# Patient Record
Sex: MALE | Race: WHITE | NOT HISPANIC OR LATINO | Employment: UNEMPLOYED | URBAN - METROPOLITAN AREA
[De-identification: names, ages, dates, MRNs, and addresses within clinical notes are randomized per-mention and may not be internally consistent; named-entity substitution may affect disease eponyms.]

---

## 2017-01-12 ENCOUNTER — OFFICE VISIT (OUTPATIENT)
Dept: PAIN MEDICINE | Facility: CLINIC | Age: 77
End: 2017-01-12

## 2017-01-12 VITALS
SYSTOLIC BLOOD PRESSURE: 112 MMHG | BODY MASS INDEX: 21.92 KG/M2 | WEIGHT: 148 LBS | OXYGEN SATURATION: 93 % | RESPIRATION RATE: 18 BRPM | HEART RATE: 94 BPM | TEMPERATURE: 98.1 F | DIASTOLIC BLOOD PRESSURE: 75 MMHG | HEIGHT: 69 IN

## 2017-01-12 DIAGNOSIS — Z79.899 ENCOUNTER FOR LONG-TERM (CURRENT) USE OF HIGH-RISK MEDICATION: ICD-10-CM

## 2017-01-12 DIAGNOSIS — M54.16 LUMBAR RADICULOPATHY: ICD-10-CM

## 2017-01-12 DIAGNOSIS — M54.12 CERVICAL RADICULOPATHY: ICD-10-CM

## 2017-01-12 DIAGNOSIS — M47.22 OSTEOARTHRITIS OF SPINE WITH RADICULOPATHY, CERVICAL REGION: ICD-10-CM

## 2017-01-12 DIAGNOSIS — M51.36 DEGENERATION OF INTERVERTEBRAL DISC OF LUMBAR REGION: ICD-10-CM

## 2017-01-12 DIAGNOSIS — G89.4 CHRONIC PAIN SYNDROME: Primary | ICD-10-CM

## 2017-01-12 PROCEDURE — 99213 OFFICE O/P EST LOW 20 MIN: CPT | Performed by: NURSE PRACTITIONER

## 2017-01-12 RX ORDER — HYDROCODONE BITARTRATE AND ACETAMINOPHEN 7.5; 325 MG/1; MG/1
1 TABLET ORAL EVERY 6 HOURS PRN
Qty: 120 TABLET | Refills: 0 | Status: SHIPPED | OUTPATIENT
Start: 2017-01-12 | End: 2017-02-10 | Stop reason: SDUPTHER

## 2017-01-12 NOTE — MR AVS SNAPSHOT
Tanya Jalil   1/12/2017 2:00 PM   Office Visit    Dept Phone:  [phone]   Encounter #:  [id number]    Provider:  TAYLER Lutz   Department:  [facility] PAIN MANAGEMENT                Your Full Care Plan              Where to Get Your Medications      You can get these medications from any pharmacy     Bring a paper prescription for each of these medications     HYDROcodone-acetaminophen 7.5-325 MG per tablet            Your Updated Medication List          This list is accurate as of: 1/12/17  2:15 PM.  Always use your most recent med list.                clonazePAM 1 MG tablet   Commonly known as:  KlonoPIN       doxepin 25 MG capsule   Commonly known as:  SINEquan       doxycycline 50 MG capsule   Commonly known as:  VIBRAMYCIN       fenofibrate 160 MG tablet       fluticasone 50 MCG/ACT nasal spray   Commonly known as:  FLONASE       gabapentin 800 MG tablet   Commonly known as:  NEURONTIN       HUMIRA PEN-PSORIASIS STARTER 40 MG/0.8ML Pen-injector Kit   Generic drug:  Adalimumab       HYDROcodone-acetaminophen 7.5-325 MG per tablet   Commonly known as:  NORCO   Take 1 tablet by mouth Every 6 (Six) Hours As Needed (pain).       insulin aspart 100 UNIT/ML solution pen-injector sc pen   Commonly known as:  novoLOG FLEXPEN       insulin glargine 100 UNIT/ML injection   Commonly known as:  LANTUS       ketoconazole 2 % shampoo   Commonly known as:  NIZORAL       vitamin B-12 500 MCG tablet   Commonly known as:  CYANOCOBALAMIN       vitamin D3 5000 UNITS capsule capsule               You Were Diagnosed With        Codes Comments    Chronic pain syndrome    -  Primary ICD-10-CM: G89.4  ICD-9-CM: 338.4     Degeneration of intervertebral disc of lumbar region     ICD-10-CM: M51.36  ICD-9-CM: 722.52     Lumbar radiculopathy     ICD-10-CM: M54.16  ICD-9-CM: 724.4     Osteoarthritis of spine with radiculopathy, cervical region     ICD-10-CM: M47.22  ICD-9-CM: 721.0   "   Cervical radiculopathy     ICD-10-CM: M54.12  ICD-9-CM: 723.4     Encounter for long-term (current) use of high-risk medication     ICD-10-CM: Z79.899  ICD-9-CM: V58.69       Instructions     None    Patient Instructions History      Upcoming Appointments     Visit Type Date Time Department    OFFICE VISIT 2017  2:00 PM [facility]      [employer] Signup     [facility] [employer] allows you to send messages to your doctor, view your test results, renew your prescriptions, schedule appointments, and more. To sign up, go to [employer] [facility] and click on the Sign Up Now link in the New User? box. Enter your [employer] Activation Code exactly as it appears below along with the last four digits of your Social Security Number and your Date of Birth () to complete the sign-up process. If you do not sign up before the expiration date, you must request a new code.    [employer] Activation Code: [email or web address]  Expires: 2017  2:15 PM    If you have questions, you can email [email or web address] or call [phone] to talk to our [employer] staff. Remember, [employer] is NOT to be used for urgent needs. For medical emergencies, dial 911.               Other Info from Your Visit           Allergies     No Known Allergies      Reason for Visit     Back Pain           Vital Signs     Blood Pressure Pulse Temperature Respirations Height Weight    112/75 94 98.1 °F (36.7 °C) 18 69\" (175.3 cm) 148 lb (67.1 kg)    Oxygen Saturation Body Mass Index Smoking Status             93% 21.86 kg/m2 Never Smoker         Problems and Diagnoses Noted     Cervical nerve root disorder    Chronic pain syndrome    Degeneration of lumbar or lumbosacral intervertebral disc    Encounter for long-term (current) use of high-risk medication    Lumbar nerve root disorder    Degenerative arthritis of cervical spine        "

## 2017-01-12 NOTE — PROGRESS NOTES
CHIEF COMPLAINT  Back Pain    Subjective   Tanya Jimenes is a 76 y.o. male  who presents to the office for follow-up.He has a history of chronic neck and low back pain.  Pain pattern remains stable today.      Continues with Hydrocodone 7.5/325 q6hrs (4/day), gabapentin 800 mg 3/day. Denies any side effects from the regimen. The regimen helps decrease his pain by 50%, he is meeting his functional goals, ADL's by self.     Neck Pain    This is a chronic problem. The current episode started more than 1 year ago. The problem occurs constantly. The problem has been unchanged. The pain is at a severity of 6/10. The pain is moderate. The symptoms are aggravated by position and bending. Pertinent negatives include no chest pain, fever, headaches, numbness, visual change or weakness. He has tried neck support and oral narcotics (gabapentin, Hydrocodone) for the symptoms. The treatment provided moderate relief.   Back Pain   This is a chronic problem. The current episode started more than 1 year ago. The problem occurs constantly. The problem is unchanged. Radiates to: right hip. The pain is at a severity of 6/10. The pain is moderate. Pertinent negatives include no bladder incontinence, bowel incontinence, chest pain, dysuria, fever, headaches, numbness or weakness. He has tried analgesics (gabapentin, Hydrocodone) for the symptoms. The treatment provided moderate relief.      PEG Assessment   What number best describes your pain on average in the past week?7  What number best describes how, during the past week, pain has interfered with your enjoyment of life?8  What number best describes how, during the past week, pain has interfered with your general activity?  8    The following portions of the patient's history were reviewed and updated as appropriate: allergies, current medications, past family history, past medical history, past social history, past surgical history and problem list.    Review of Systems  "  Constitutional: Negative for chills and fever.   Respiratory: Negative for shortness of breath.    Cardiovascular: Negative for chest pain.   Gastrointestinal: Negative for bowel incontinence, constipation, diarrhea, nausea and vomiting.   Genitourinary: Negative for bladder incontinence, difficulty urinating and dysuria.   Musculoskeletal: Positive for back pain and neck pain (bilat. arm pain as well).   Neurological: Negative for dizziness, weakness, light-headedness, numbness and headaches.   Psychiatric/Behavioral: Positive for sleep disturbance. Negative for confusion, hallucinations, self-injury and suicidal ideas. The patient is not nervous/anxious.        Vitals:    01/12/17 1404   BP: 112/75   Pulse: 94   Resp: 18   Temp: 98.1 °F (36.7 °C)   SpO2: 93%   Weight: 148 lb (67.1 kg)   Height: 69\" (175.3 cm)   PainSc:   6   PainLoc: Back     Objective   Physical Exam   Constitutional: He is oriented to person, place, and time. Vital signs are normal. He appears well-developed and well-nourished. He is cooperative.   HENT:   Head: Normocephalic and atraumatic.   Nose: Nose normal.   Eyes: Conjunctivae and lids are normal.   Neck: Trachea normal. Neck supple. Muscular tenderness present. Decreased range of motion present.   Cardiovascular: Normal rate and regular rhythm.    Pulmonary/Chest: Effort normal. No respiratory distress.   Abdominal: Normal appearance.   Musculoskeletal:        Cervical back: He exhibits tenderness and spasm.        Lumbar back: He exhibits tenderness and pain.   Neurological: He is alert and oriented to person, place, and time. Gait normal.   Reflex Scores:       Tricep reflexes are 1+ on the right side and 1+ on the left side.       Bicep reflexes are 1+ on the right side and 1+ on the left side.       Patellar reflexes are 1+ on the right side and 1+ on the left side.  Skin: Skin is warm, dry and intact.   Psychiatric: His speech is normal and behavior is normal. Judgment and thought " content normal. Cognition and memory are normal.   Nursing note and vitals reviewed.          Assessment/Plan   Tanya was seen today for back pain.    Diagnoses and all orders for this visit:    Chronic pain syndrome    Degeneration of intervertebral disc of lumbar region    Lumbar radiculopathy    Osteoarthritis of spine with radiculopathy, cervical region    Cervical radiculopathy    Encounter for long-term (current) use of high-risk medication    Other orders  -     HYDROcodone-acetaminophen (NORCO) 7.5-325 MG per tablet; Take 1 tablet by mouth Every 6 (Six) Hours As Needed (pain).      --- Refill Hydrocodone.  Patient appears stable with current regimen. No adverse effects. Regarding continuation of opioids, there is no evidence of aberrant behavior or any red flags.  The patient continues with appropriate response to opioid therapy. ADL's remain intact by self.   --- The urine drug screen confirmation from 7- has been reviewed and the result is appropriate based on patient history and SULEIMAN report  --- Follow-up 1 month               SULEIMAN REPORT    As part of the patient's treatment plan, I am prescribing controlled substances. The patient has been made aware of appropriate use of such medications, including potential risk of somnolence, limited ability to drive and/or work safely, and the potential for dependence or overdose. It has also bee made clear that these medications are for use by this patient only, without concomitant use of alcohol or other substances unless prescribed.     Patient has completed prescribing agreement detailing terms of continued prescribing of controlled substances, including monitoring SULEIMAN reports, urine drug screening, and pill counts if necessary. The patient is aware that inappropriate use will results in cessation of prescribing such medications.    SULEIMAN report has been reviewed and scanned into the patient's chart.    Date of last SULEIMAN : 1-    History  and physical exam exhibit continued safe and appropriate use of controlled substances.    EMR Dragon/Transcription disclaimer:   Much of this encounter note is an electronic transcription/translation of spoken language to printed text. The electronic translation of spoken language may permit erroneous, or at times, nonsensical words or phrases to be inadvertently transcribed; Although I have reviewed the note for such errors, some may still exist.

## 2017-02-10 ENCOUNTER — OFFICE VISIT (OUTPATIENT)
Dept: PAIN MEDICINE | Facility: CLINIC | Age: 77
End: 2017-02-10

## 2017-02-10 VITALS
RESPIRATION RATE: 16 BRPM | HEIGHT: 69 IN | BODY MASS INDEX: 21.77 KG/M2 | SYSTOLIC BLOOD PRESSURE: 118 MMHG | TEMPERATURE: 98.4 F | HEART RATE: 89 BPM | DIASTOLIC BLOOD PRESSURE: 75 MMHG | OXYGEN SATURATION: 95 % | WEIGHT: 147 LBS

## 2017-02-10 DIAGNOSIS — M51.36 DEGENERATION OF INTERVERTEBRAL DISC OF LUMBAR REGION: ICD-10-CM

## 2017-02-10 DIAGNOSIS — Z79.899 ENCOUNTER FOR LONG-TERM (CURRENT) USE OF HIGH-RISK MEDICATION: ICD-10-CM

## 2017-02-10 DIAGNOSIS — G89.4 CHRONIC PAIN SYNDROME: Primary | ICD-10-CM

## 2017-02-10 DIAGNOSIS — M54.2 NECK PAIN: ICD-10-CM

## 2017-02-10 DIAGNOSIS — M54.12 CERVICAL RADICULOPATHY: ICD-10-CM

## 2017-02-10 DIAGNOSIS — M54.16 LUMBAR RADICULOPATHY: ICD-10-CM

## 2017-02-10 PROCEDURE — 96372 THER/PROPH/DIAG INJ SC/IM: CPT | Performed by: NURSE PRACTITIONER

## 2017-02-10 PROCEDURE — 99213 OFFICE O/P EST LOW 20 MIN: CPT | Performed by: NURSE PRACTITIONER

## 2017-02-10 RX ORDER — HYDROCODONE BITARTRATE AND ACETAMINOPHEN 7.5; 325 MG/1; MG/1
1 TABLET ORAL EVERY 6 HOURS PRN
Qty: 120 TABLET | Refills: 0 | Status: SHIPPED | OUTPATIENT
Start: 2017-02-10 | End: 2017-03-10 | Stop reason: SDUPTHER

## 2017-02-10 RX ORDER — KETOROLAC TROMETHAMINE 15 MG/ML
15 INJECTION, SOLUTION INTRAMUSCULAR; INTRAVENOUS ONCE
Status: COMPLETED | OUTPATIENT
Start: 2017-02-10 | End: 2017-02-10

## 2017-02-10 RX ADMIN — KETOROLAC TROMETHAMINE 15 MG: 15 INJECTION, SOLUTION INTRAMUSCULAR; INTRAVENOUS at 14:46

## 2017-02-10 NOTE — PROGRESS NOTES
CHIEF COMPLAINT  Since 1/12/17 office visit,Pt states LBP is tolerably controlled overall.  Pt states activity level has decreased since 1/12/17 however.    Ho Jimenes is a 76 y.o. male  who presents to the office for follow-up.He has a history of chronic neck and low back pain.  Pain unchanged, moderately controlled overall.      He reports that his neck and back have both been flared up over the past week or so.  He reports a lot of aching in his joints, the pain is diffuse.  He reports that he has not been sleeping well. He has been tossing and turning at night because he is uncomfortable.       Continues with Hydrocodone 7.5/325 q6hrs (4/day), gabapentin 800 mg 3/day. Denies any side effects from the regimen. The regimen helps decrease his pain by 50%, he is meeting his functional goals, ADL's by self.     Neck Pain    This is a chronic problem. The current episode started more than 1 year ago. The problem occurs constantly. The problem has been unchanged. The pain is at a severity of 6/10. The pain is moderate. The symptoms are aggravated by position and bending. Pertinent negatives include no chest pain, fever, headaches, numbness, visual change or weakness. He has tried neck support and oral narcotics (gabapentin, Hydrocodone) for the symptoms. The treatment provided moderate relief.   Back Pain   This is a chronic problem. The current episode started more than 1 year ago. The problem occurs constantly. The problem is unchanged. Radiates to: right hip. The pain is at a severity of 6/10. The pain is moderate. Pertinent negatives include no bladder incontinence, bowel incontinence, chest pain, dysuria, fever, headaches, numbness or weakness. He has tried analgesics (gabapentin, Hydrocodone) for the symptoms. The treatment provided moderate relief.      PEG Assessment   What number best describes your pain on average in the past week?6  What number best describes how, during the past week, pain  "has interfered with your enjoyment of life?7  What number best describes how, during the past week, pain has interfered with your general activity?  8    The following portions of the patient's history were reviewed and updated as appropriate: allergies, current medications, past family history, past medical history, past social history, past surgical history and problem list.    Review of Systems   Constitutional: Positive for activity change (decreased). Negative for appetite change and fever.   Respiratory: Negative for apnea, chest tightness and shortness of breath.    Cardiovascular: Negative for chest pain.   Gastrointestinal: Negative for bowel incontinence, constipation, diarrhea and nausea.   Genitourinary: Negative for bladder incontinence, difficulty urinating and dysuria.   Musculoskeletal: Positive for back pain and neck pain.   Neurological: Negative for dizziness, weakness, numbness and headaches.   Psychiatric/Behavioral: Positive for sleep disturbance. Negative for suicidal ideas.     Vitals:    02/10/17 1402   BP: 118/75   Pulse: 89   Resp: 16   Temp: 98.4 °F (36.9 °C)   SpO2: 95%   Weight: 147 lb (66.7 kg)   Height: 69\" (175.3 cm)   PainSc: 6  Comment: LBP bilaterally ranges from 6-8/10   PainLoc: Back     Objective   Physical Exam   Constitutional: He is oriented to person, place, and time. Vital signs are normal. He appears well-developed and well-nourished. He is cooperative.   HENT:   Head: Normocephalic and atraumatic.   Nose: Nose normal.   Eyes: Conjunctivae and lids are normal.   Neck: Trachea normal. Neck supple. Muscular tenderness present. Decreased range of motion present.   Cardiovascular: Normal rate and regular rhythm.    Pulmonary/Chest: Effort normal. No respiratory distress.   Abdominal: Normal appearance.   Musculoskeletal:        Cervical back: He exhibits tenderness and spasm.        Lumbar back: He exhibits tenderness and pain.   Neurological: He is alert and oriented to " person, place, and time. Gait normal.   Reflex Scores:       Patellar reflexes are 1+ on the right side and 1+ on the left side.  Skin: Skin is warm, dry and intact.   Psychiatric: His speech is normal and behavior is normal. Judgment and thought content normal. Cognition and memory are normal.   Nursing note and vitals reviewed.    Assessment/Plan   Tanya was seen today for back pain.    Diagnoses and all orders for this visit:    Chronic pain syndrome  -     ketorolac (TORADOL) injection 15 mg; Inject 15 mg into the shoulder, thigh, or buttocks 1 (One) Time.    Degeneration of intervertebral disc of lumbar region    Lumbar radiculopathy    Neck pain    Cervical radiculopathy    Encounter for long-term (current) use of high-risk medication    Other orders  -     HYDROcodone-acetaminophen (NORCO) 7.5-325 MG per tablet; Take 1 tablet by mouth Every 6 (Six) Hours As Needed (pain).      --- Refill Hydrocodone. Patient appears stable with current regimen. No adverse effects. Regarding continuation of opioids, there is no evidence of aberrant behavior or any red flags. The patient continues with appropriate response to opioid therapy. ADL's remain intact by self.   --- The urine drug screen confirmation from 7- has been reviewed and the result is appropriate based on patient history and SULEIMAN report  --- Order for lumbar brace.  Ordering a back brace with anterior, lateral, and posterior support from T-9 to S-1 to reduce pain by restricting mobility of the trunk.    --- Follow-up 1 month          SULEIMAN REPORT    As part of the patient's treatment plan, I am prescribing controlled substances. The patient has been made aware of appropriate use of such medications, including potential risk of somnolence, limited ability to drive and/or work safely, and the potential for dependence or overdose. It has also bee made clear that these medications are for use by this patient only, without concomitant use of alcohol or  other substances unless prescribed.     Patient has completed prescribing agreement detailing terms of continued prescribing of controlled substances, including monitoring SULEIMAN reports, urine drug screening, and pill counts if necessary. The patient is aware that inappropriate use will results in cessation of prescribing such medications.    SULEIMAN report has been reviewed and scanned into the patient's chart.    Date of last SULEIMAN : 2-9-2017    History and physical exam exhibit continued safe and appropriate use of controlled substances.    EMR Dragon/Transcription disclaimer:   Much of this encounter note is an electronic transcription/translation of spoken language to printed text. The electronic translation of spoken language may permit erroneous, or at times, nonsensical words or phrases to be inadvertently transcribed; Although I have reviewed the note for such errors, some may still exist.

## 2017-03-10 ENCOUNTER — OFFICE VISIT (OUTPATIENT)
Dept: PAIN MEDICINE | Facility: CLINIC | Age: 77
End: 2017-03-10

## 2017-03-10 VITALS
TEMPERATURE: 97.4 F | DIASTOLIC BLOOD PRESSURE: 76 MMHG | WEIGHT: 148.4 LBS | SYSTOLIC BLOOD PRESSURE: 121 MMHG | OXYGEN SATURATION: 96 % | RESPIRATION RATE: 18 BRPM | HEART RATE: 92 BPM | BODY MASS INDEX: 21.98 KG/M2 | HEIGHT: 69 IN

## 2017-03-10 DIAGNOSIS — M51.36 DEGENERATION OF INTERVERTEBRAL DISC OF LUMBAR REGION: ICD-10-CM

## 2017-03-10 DIAGNOSIS — Z79.899 ENCOUNTER FOR LONG-TERM (CURRENT) USE OF HIGH-RISK MEDICATION: ICD-10-CM

## 2017-03-10 DIAGNOSIS — M54.16 LUMBAR RADICULOPATHY: ICD-10-CM

## 2017-03-10 DIAGNOSIS — M54.2 NECK PAIN: ICD-10-CM

## 2017-03-10 DIAGNOSIS — G89.4 CHRONIC PAIN SYNDROME: Primary | ICD-10-CM

## 2017-03-10 PROCEDURE — 99213 OFFICE O/P EST LOW 20 MIN: CPT | Performed by: NURSE PRACTITIONER

## 2017-03-10 RX ORDER — HYDROCODONE BITARTRATE AND ACETAMINOPHEN 7.5; 325 MG/1; MG/1
1 TABLET ORAL EVERY 6 HOURS PRN
Qty: 120 TABLET | Refills: 0 | Status: SHIPPED | OUTPATIENT
Start: 2017-03-10 | End: 2017-04-03 | Stop reason: SDUPTHER

## 2017-03-10 NOTE — PROGRESS NOTES
CHIEF COMPLAINT  Follow-up for back pain. Mr. Jimenes states that his back pain is unchanged from his last appt.    Subjective   Tanya Jimenes is a 76 y.o. male  who presents to the office for follow-up.He has a history of chronic back pain. Pain unchanged, stable.      Continues with Hydrocodone 7.5/325 q6hrs (4/day), gabapentin 800 mg 3/day. Denies any side effects from the regimen. The regimen helps decrease his pain by 50%, he is meeting his functional goals, ADL's by self.     Neck Pain    This is a chronic problem. The current episode started more than 1 year ago. The problem occurs constantly. The problem has been unchanged. The pain is at a severity of 5/10. The pain is moderate. The symptoms are aggravated by position and bending. Pertinent negatives include no chest pain, fever, headaches, numbness, visual change or weakness. He has tried neck support and oral narcotics (gabapentin, Hydrocodone) for the symptoms. The treatment provided moderate relief.   Back Pain   This is a chronic problem. The current episode started more than 1 year ago. The problem occurs constantly. The problem is unchanged. Radiates to: right hip. The pain is at a severity of 5/10. The pain is moderate. Pertinent negatives include no bladder incontinence, bowel incontinence, chest pain, dysuria, fever, headaches, numbness or weakness. He has tried analgesics (gabapentin, Hydrocodone) for the symptoms. The treatment provided moderate relief.      PEG Assessment   What number best describes your pain on average in the past week?7  What number best describes how, during the past week, pain has interfered with your enjoyment of life?7  What number best describes how, during the past week, pain has interfered with your general activity?  7    The following portions of the patient's history were reviewed and updated as appropriate: allergies, current medications, past family history, past medical history, past social history, past surgical  "history and problem list.    Review of Systems   Constitutional: Negative for fatigue and fever.   HENT: Negative for congestion.    Eyes:        Dry eyes   Respiratory: Negative for cough, shortness of breath and wheezing.    Cardiovascular: Negative.  Negative for chest pain.   Gastrointestinal: Negative for bowel incontinence, constipation and diarrhea.   Genitourinary: Negative for bladder incontinence, difficulty urinating and dysuria.   Musculoskeletal: Positive for back pain and neck pain.   Neurological: Negative for weakness, numbness and headaches.   Psychiatric/Behavioral: Positive for sleep disturbance. Negative for suicidal ideas. The patient is nervous/anxious.        Vitals:    03/10/17 1348   BP: 121/76   Pulse: 92   Resp: 18   Temp: 97.4 °F (36.3 °C)   SpO2: 96%   Weight: 148 lb 6.4 oz (67.3 kg)   Height: 69\" (175.3 cm)   PainSc:   5   PainLoc: Back     Objective   Physical Exam   Constitutional: He is oriented to person, place, and time. Vital signs are normal. He appears well-developed and well-nourished. He is cooperative.   HENT:   Head: Normocephalic and atraumatic.   Nose: Nose normal.   Eyes: Conjunctivae and lids are normal.   Neck: Trachea normal. Neck supple. Muscular tenderness present. Decreased range of motion present.   Cardiovascular: Normal rate and regular rhythm.    Pulmonary/Chest: Effort normal. No respiratory distress.   Abdominal: Normal appearance.   Musculoskeletal:        Cervical back: He exhibits tenderness and spasm.        Lumbar back: He exhibits tenderness and pain.   Neurological: He is alert and oriented to person, place, and time. Gait normal.   Skin: Skin is warm, dry and intact.   Psychiatric: His speech is normal and behavior is normal. Judgment and thought content normal. Cognition and memory are normal.   Nursing note and vitals reviewed.    Assessment/Plan   Tanya was seen today for back pain.    Diagnoses and all orders for this visit:    Chronic pain " syndrome    Degeneration of intervertebral disc of lumbar region    Lumbar radiculopathy    Neck pain    Encounter for long-term (current) use of high-risk medication    Other orders  -     HYDROcodone-acetaminophen (NORCO) 7.5-325 MG per tablet; Take 1 tablet by mouth Every 6 (Six) Hours As Needed (pain).    --- Refill Hydrocodone. Patient appears stable with current regimen. No adverse effects. Regarding continuation of opioids, there is no evidence of aberrant behavior or any red flags. The patient continues with appropriate response to opioid therapy. ADL's remain intact by self.   --- The urine drug screen confirmation from 7- has been reviewed and the result is appropriate based on patient history and SULEIMAN report  --- Follow-up 2 months        SULEIMAN REPORT  As part of the patient's treatment plan, I am prescribing controlled substances. The patient has been made aware of appropriate use of such medications, including potential risk of somnolence, limited ability to drive and/or work safely, and the potential for dependence or overdose. It has also bee made clear that these medications are for use by this patient only, without concomitant use of alcohol or other substances unless prescribed.     Patient has completed prescribing agreement detailing terms of continued prescribing of controlled substances, including monitoring SULEIMAN reports, urine drug screening, and pill counts if necessary. The patient is aware that inappropriate use will results in cessation of prescribing such medications.    SULEIMAN report has been reviewed and scanned into the patient's chart.    Date of last SULEIMAN : 3-9-2017    History and physical exam exhibit continued safe and appropriate use of controlled substances.    EMR Dragon/Transcription disclaimer:   Much of this encounter note is an electronic transcription/translation of spoken language to printed text. The electronic translation of spoken language may permit  erroneous, or at times, nonsensical words or phrases to be inadvertently transcribed; Although I have reviewed the note for such errors, some may still exist.

## 2017-04-03 NOTE — TELEPHONE ENCOUNTER
Medication Refill Request    Date of phone call: 4/3/17    Medication being requested: Hydro-apap 7.5 sig: q6hrs  Qty: 120    Date of last visit: 3/10/17    Date of last refill: 3/10/17    SULEIMAN up to date?: 3/9/17    Next Follow up?: 5/10/17    Any new pertinent information? (i.e, new medication allergies, new use of medications, change in patient's health or condition, non-compliance or inconsistency with prescribing agreement?):

## 2017-04-04 RX ORDER — HYDROCODONE BITARTRATE AND ACETAMINOPHEN 7.5; 325 MG/1; MG/1
1 TABLET ORAL EVERY 6 HOURS PRN
Qty: 120 TABLET | Refills: 0 | Status: SHIPPED | OUTPATIENT
Start: 2017-04-04 | End: 2017-05-10 | Stop reason: SDUPTHER

## 2017-05-10 ENCOUNTER — OFFICE VISIT (OUTPATIENT)
Dept: PAIN MEDICINE | Facility: CLINIC | Age: 77
End: 2017-05-10

## 2017-05-10 VITALS
RESPIRATION RATE: 18 BRPM | HEART RATE: 85 BPM | HEIGHT: 69 IN | BODY MASS INDEX: 21.48 KG/M2 | DIASTOLIC BLOOD PRESSURE: 70 MMHG | TEMPERATURE: 98.6 F | SYSTOLIC BLOOD PRESSURE: 110 MMHG | WEIGHT: 145 LBS | OXYGEN SATURATION: 94 %

## 2017-05-10 DIAGNOSIS — M54.2 NECK PAIN: ICD-10-CM

## 2017-05-10 DIAGNOSIS — M51.36 DEGENERATION OF INTERVERTEBRAL DISC OF LUMBAR REGION: ICD-10-CM

## 2017-05-10 DIAGNOSIS — G89.4 CHRONIC PAIN SYNDROME: Primary | ICD-10-CM

## 2017-05-10 DIAGNOSIS — Z79.899 ENCOUNTER FOR LONG-TERM (CURRENT) USE OF HIGH-RISK MEDICATION: ICD-10-CM

## 2017-05-10 DIAGNOSIS — M54.16 LUMBAR RADICULOPATHY: ICD-10-CM

## 2017-05-10 PROCEDURE — 99213 OFFICE O/P EST LOW 20 MIN: CPT | Performed by: NURSE PRACTITIONER

## 2017-05-10 RX ORDER — HYDROCODONE BITARTRATE AND ACETAMINOPHEN 7.5; 325 MG/1; MG/1
1 TABLET ORAL EVERY 6 HOURS PRN
Qty: 120 TABLET | Refills: 0 | Status: SHIPPED | OUTPATIENT
Start: 2017-05-10 | End: 2017-06-01 | Stop reason: SDUPTHER

## 2017-06-01 RX ORDER — HYDROCODONE BITARTRATE AND ACETAMINOPHEN 7.5; 325 MG/1; MG/1
1 TABLET ORAL EVERY 6 HOURS PRN
Qty: 120 TABLET | Refills: 0 | Status: SHIPPED | OUTPATIENT
Start: 2017-06-01 | End: 2017-07-10 | Stop reason: SDUPTHER

## 2017-06-01 NOTE — TELEPHONE ENCOUNTER
Medication Refill Request    Date of phone call: 6/1/17    Medication being requested: Hydro-apap 7.5 sig: q6hrs  Qty: 120    Date of last visit: 5/10/17    Date of last refill: 5/10/17    SULEIMAN up to date?: 5/8/17    Next Follow up?: 7/10/17    Any new pertinent information? (i.e, new medication allergies, new use of medications, change in patient's health or condition, non-compliance or inconsistency with prescribing agreement?): n/a

## 2017-07-10 ENCOUNTER — OFFICE VISIT (OUTPATIENT)
Dept: PAIN MEDICINE | Facility: CLINIC | Age: 77
End: 2017-07-10

## 2017-07-10 VITALS
SYSTOLIC BLOOD PRESSURE: 130 MMHG | DIASTOLIC BLOOD PRESSURE: 81 MMHG | BODY MASS INDEX: 21.18 KG/M2 | HEART RATE: 105 BPM | OXYGEN SATURATION: 93 % | RESPIRATION RATE: 18 BRPM | TEMPERATURE: 98.6 F | WEIGHT: 143 LBS | HEIGHT: 69 IN

## 2017-07-10 DIAGNOSIS — M47.22 OSTEOARTHRITIS OF SPINE WITH RADICULOPATHY, CERVICAL REGION: ICD-10-CM

## 2017-07-10 DIAGNOSIS — G89.4 CHRONIC PAIN SYNDROME: Primary | ICD-10-CM

## 2017-07-10 DIAGNOSIS — M54.2 NECK PAIN: ICD-10-CM

## 2017-07-10 DIAGNOSIS — Z79.899 ENCOUNTER FOR LONG-TERM (CURRENT) USE OF HIGH-RISK MEDICATION: ICD-10-CM

## 2017-07-10 DIAGNOSIS — M51.36 DEGENERATION OF INTERVERTEBRAL DISC OF LUMBAR REGION: ICD-10-CM

## 2017-07-10 LAB
POC AMPHETAMINES: NEGATIVE
POC BARBITURATES: NEGATIVE
POC BENZODIAZEPHINES: NEGATIVE
POC COCAINE: NEGATIVE
POC METHADONE: NEGATIVE
POC METHAMPHETAMINE SCREEN URINE: NEGATIVE
POC OPIATES: POSITIVE
POC OXYCODONE: POSITIVE
POC PHENCYCLIDINE: NEGATIVE
POC PROPOXYPHENE: NEGATIVE
POC THC: NEGATIVE
POC TRICYCLIC ANTIDEPRESSANTS: POSITIVE

## 2017-07-10 PROCEDURE — 99213 OFFICE O/P EST LOW 20 MIN: CPT | Performed by: NURSE PRACTITIONER

## 2017-07-10 PROCEDURE — 80305 DRUG TEST PRSMV DIR OPT OBS: CPT | Performed by: NURSE PRACTITIONER

## 2017-07-10 RX ORDER — HYDROCODONE BITARTRATE AND ACETAMINOPHEN 7.5; 325 MG/1; MG/1
1 TABLET ORAL EVERY 6 HOURS PRN
Qty: 120 TABLET | Refills: 0 | Status: SHIPPED | OUTPATIENT
Start: 2017-07-10 | End: 2017-08-02 | Stop reason: SDUPTHER

## 2017-07-10 NOTE — PROGRESS NOTES
"CHIEF COMPLAINT  Back pain has increased since last visit.    Subjective   Tanya Jimenes is a 77 y.o. male  who presents to the office for follow-up.He has a history of chronic back pain and neck pain.    Complains of pain in his neck and back. Today his pain is 8/10VAS. Describes the pain as continuous and worse since his last week.  He has difficulty distinguishing between which bothers him more, his back or neck, but eventually decides on his low back. Pain increases with walking, prolonged sitting, household chores, bending/lifting/twisting; pain decreases with medication and change position. continues with Hydrocodone 7.5/325 4/day and gabapentin 800 mg 3/day. Denies any side effects from the regimen. The regimen helps decrease his pain \"by quite a bit so i can function.\"  ADL's by self.     He has had MIKI in the past with positive results.  However, he states he has had issues with his mouth getting blisters after his last round of injections.     Sees his PCP later this afternoon, Dr. Nuñez.    Neck Pain    This is a chronic problem. The current episode started more than 1 year ago. The problem occurs constantly. The problem has been unchanged. The pain is at a severity of 6/10. The pain is moderate. The symptoms are aggravated by position and bending. Associated symptoms include weakness. Pertinent negatives include no chest pain, fever, headaches, numbness or visual change. He has tried neck support and oral narcotics (gabapentin, Hydrocodone) for the symptoms. The treatment provided moderate relief.   Back Pain   This is a chronic problem. The current episode started more than 1 year ago. The problem occurs constantly. The problem is unchanged. Radiates to: right hip. The pain is at a severity of 8/10. The pain is moderate. Associated symptoms include weakness. Pertinent negatives include no bladder incontinence, bowel incontinence, chest pain, dysuria, fever, headaches or numbness. He has tried " "analgesics (gabapentin, Hydrocodone) for the symptoms. The treatment provided moderate relief.      PEG Assessment   What number best describes your pain on average in the past week?6  What number best describes how, during the past week, pain has interfered with your enjoyment of life?8  What number best describes how, during the past week, pain has interfered with your general activity?  7    The following portions of the patient's history were reviewed and updated as appropriate: allergies, current medications, past family history, past medical history, past social history, past surgical history and problem list.    Review of Systems   Constitutional: Negative for chills and fever.   Respiratory: Negative for shortness of breath.    Cardiovascular: Negative for chest pain.   Gastrointestinal: Negative for bowel incontinence, constipation, diarrhea, nausea and vomiting.   Genitourinary: Negative for bladder incontinence, difficulty urinating and dysuria.   Musculoskeletal: Positive for back pain and neck pain.   Neurological: Positive for weakness. Negative for dizziness, light-headedness, numbness and headaches.   Psychiatric/Behavioral: Positive for sleep disturbance. Negative for confusion, hallucinations, self-injury and suicidal ideas. The patient is not nervous/anxious.        Vitals:    07/10/17 1348   BP: 130/81   Pulse: 105   Resp: 18   Temp: 98.6 °F (37 °C)   SpO2: 93%   Weight: 143 lb (64.9 kg)   Height: 69\" (175.3 cm)   PainSc:   8   PainLoc: Back     Objective   Physical Exam   Constitutional: He is oriented to person, place, and time. Vital signs are normal. He appears well-developed and well-nourished. He is cooperative.   HENT:   Head: Normocephalic and atraumatic.   Nose: Nose normal.   Eyes: Conjunctivae and lids are normal.   Neck: Trachea normal. Neck supple. Muscular tenderness present. Decreased range of motion present.   Cardiovascular: Normal rate, regular rhythm and normal heart sounds.  "   Pulmonary/Chest: Effort normal and breath sounds normal. No respiratory distress.   Abdominal: Normal appearance.   Musculoskeletal:        Cervical back: He exhibits tenderness.        Lumbar back: He exhibits tenderness.   Moderate tenderness to bilateral SI joints    Negative SLR bilaterally   Neurological: He is alert and oriented to person, place, and time. He has normal strength. No cranial nerve deficit or sensory deficit. Gait normal.   Reflex Scores:       Bicep reflexes are 1+ on the right side and 1+ on the left side.       Brachioradialis reflexes are 1+ on the right side and 1+ on the left side.       Patellar reflexes are 1+ on the right side and 1+ on the left side.  Skin: Skin is warm, dry and intact.   Psychiatric: He has a normal mood and affect. His speech is normal and behavior is normal. Judgment and thought content normal. Cognition and memory are normal.   Nursing note and vitals reviewed.      Assessment/Plan   Tanya was seen today for back pain.    Diagnoses and all orders for this visit:    Chronic pain syndrome  -     Urine Drug Screen Confirmation  -     POC Urine Drug Screen, Triage    Degeneration of intervertebral disc of lumbar region  -     Urine Drug Screen Confirmation  -     POC Urine Drug Screen, Triage    Osteoarthritis of spine with radiculopathy, cervical region  -     Urine Drug Screen Confirmation  -     POC Urine Drug Screen, Triage    Neck pain  -     Urine Drug Screen Confirmation  -     POC Urine Drug Screen, Triage    Encounter for long-term (current) use of high-risk medication  -     Urine Drug Screen Confirmation  -     POC Urine Drug Screen, Triage    Other orders  -     HYDROcodone-acetaminophen (NORCO) 7.5-325 MG per tablet; Take 1 tablet by mouth Every 6 (Six) Hours As Needed for Severe Pain (7-10) (DNF 6-8-17).      --- Routine UDS in office today as part of monitoring requirements for controlled substances.  The specimen was viewed and the immunoassay result  reviewed and is +OPI, +OXY, +TCA(Anticipate false positive of OXY).  This specimen will be sent to [facility] laboratory for confirmation.     --- Refill Hydrocodone. Patient appears stable with current regimen. No adverse effects. Regarding continuation of opioids, there is no evidence of aberrant behavior or any red flags.  The patient continues with appropriate response to opioid therapy. ADL's remain intact by self.   --- Declines bilateral Si joint or lumbar facet injections.  --- Follow-up 2 months or sooner if needed.       SULEIMAN REPORT    As part of the patient's treatment plan, I am prescribing controlled substances. The patient has been made aware of appropriate use of such medications, including potential risk of somnolence, limited ability to drive and/or work safely, and the potential for dependence or overdose. It has also bee made clear that these medications are for use by this patient only, without concomitant use of alcohol or other substances unless prescribed.     Patient has completed prescribing agreement detailing terms of continued prescribing of controlled substances, including monitoring SULEIMAN reports, urine drug screening, and pill counts if necessary. The patient is aware that inappropriate use will results in cessation of prescribing such medications.    SULEIMAN report has been reviewed and scanned into the patient's chart.    Date of last SULEIMAN : 7-7-17    History and physical exam exhibit continued safe and appropriate use of controlled substances.      EMR Dragon/Transcription disclaimer:   Much of this encounter note is an electronic transcription/translation of spoken language to printed text. The electronic translation of spoken language may permit erroneous, or at times, nonsensical words or phrases to be inadvertently transcribed; Although I have reviewed the note for such errors, some may still exist.

## 2017-08-02 RX ORDER — HYDROCODONE BITARTRATE AND ACETAMINOPHEN 7.5; 325 MG/1; MG/1
1 TABLET ORAL EVERY 6 HOURS PRN
Qty: 120 TABLET | Refills: 0 | Status: SHIPPED | OUTPATIENT
Start: 2017-08-02 | End: 2017-09-06 | Stop reason: SDUPTHER

## 2017-08-02 NOTE — TELEPHONE ENCOUNTER
Medication Refill Request    Date of phone call: 8/2/17    Medication being requested: Hydrocodone-apap 7.5 sig: q6hrs  Qty: 120    Date of last visit: 7/10/17    Date of last refill: 7/10/17    SULEIMAN up to date?: 7/7/17    Next Follow up?: 9/7/17    Any new pertinent information? (i.e, new medication allergies, new use of medications, change in patient's health or condition, non-compliance or inconsistency with prescribing agreement?): n/a

## 2017-09-06 RX ORDER — HYDROCODONE BITARTRATE AND ACETAMINOPHEN 7.5; 325 MG/1; MG/1
1 TABLET ORAL EVERY 6 HOURS PRN
Qty: 24 TABLET | Refills: 0 | Status: SHIPPED | OUTPATIENT
Start: 2017-09-06 | End: 2017-09-14 | Stop reason: SDUPTHER

## 2017-09-06 NOTE — TELEPHONE ENCOUNTER
Medication Refill Request    Date of phone call: 9/6/2017    Medication being requested: Hydrocodone-apap 7.5 sig: q6hrs  Qty: 120    Date of last visit: 7/10/17    Date of last refill: 8/2/17    SULEIMAN up to date?: 7/7/17    Next Follow up?: 9/14/17    Any new pertinent information? (i.e, new medication allergies, new use of medications, change in patient's health or condition, non-compliance or inconsistency with prescribing agreement?): patient had to reschedule appointment due to a death in the family

## 2017-09-06 NOTE — TELEPHONE ENCOUNTER
SULEIMAN so I can see actual last refill date and then will give temporary supply until 9-14-17. Thanks. johann

## 2017-09-14 ENCOUNTER — OFFICE VISIT (OUTPATIENT)
Dept: PAIN MEDICINE | Facility: CLINIC | Age: 77
End: 2017-09-14

## 2017-09-14 VITALS
RESPIRATION RATE: 18 BRPM | HEART RATE: 79 BPM | BODY MASS INDEX: 21.18 KG/M2 | WEIGHT: 143 LBS | SYSTOLIC BLOOD PRESSURE: 110 MMHG | HEIGHT: 69 IN | OXYGEN SATURATION: 97 % | TEMPERATURE: 98.1 F | DIASTOLIC BLOOD PRESSURE: 68 MMHG

## 2017-09-14 DIAGNOSIS — M47.812 OSTEOARTHRITIS OF CERVICAL SPINE, UNSPECIFIED SPINAL OSTEOARTHRITIS COMPLICATION STATUS: ICD-10-CM

## 2017-09-14 DIAGNOSIS — M54.12 CERVICAL RADICULOPATHY: ICD-10-CM

## 2017-09-14 DIAGNOSIS — M54.2 NECK PAIN: ICD-10-CM

## 2017-09-14 DIAGNOSIS — M54.16 LUMBAR RADICULOPATHY: ICD-10-CM

## 2017-09-14 DIAGNOSIS — M51.36 DEGENERATION OF INTERVERTEBRAL DISC OF LUMBAR REGION: ICD-10-CM

## 2017-09-14 DIAGNOSIS — G89.4 CHRONIC PAIN SYNDROME: Primary | ICD-10-CM

## 2017-09-14 DIAGNOSIS — Z79.899 ENCOUNTER FOR LONG-TERM (CURRENT) USE OF HIGH-RISK MEDICATION: ICD-10-CM

## 2017-09-14 PROCEDURE — 99213 OFFICE O/P EST LOW 20 MIN: CPT | Performed by: NURSE PRACTITIONER

## 2017-09-14 RX ORDER — HYDROCODONE BITARTRATE AND ACETAMINOPHEN 7.5; 325 MG/1; MG/1
1 TABLET ORAL EVERY 6 HOURS PRN
Qty: 120 TABLET | Refills: 0 | Status: SHIPPED | OUTPATIENT
Start: 2017-09-14 | End: 2017-10-05 | Stop reason: SDUPTHER

## 2017-09-14 NOTE — PROGRESS NOTES
CHIEF COMPLAINT  Patient is here today for a follow up on his back pain that is unchanged since last visit.    Subjective   Tanya Jimenes is a 77 y.o. male  who presents to the office for follow-up.He has a history of back and neck pain. HIs pain pattern is unchanged since his last office visit.     Complains of pain in his neck and back. Today his pain is 6-7/10VAS. Describes the pain as continuous and worse since his last week.  Continues with Hydrocodone 7.5/325 4/day and gabapentin 800 mg 2/day(prescribed by Dr. Nuñez). Denies any side effects from the regimen. The regimen helps decrease his pain moderately.  ADL's by self.     Gabapentin was decreased by Dr. Nuñez. Klonopin was increased to 3/day.     He has had MIKI in the past with positive results.  However, he states he has had issues with his mouth getting blisters after his last round of injections.     Neck Pain    This is a chronic problem. The current episode started more than 1 year ago. The problem occurs constantly. The problem has been unchanged. The pain is at a severity of 6/10. The pain is moderate. The symptoms are aggravated by position and bending. Pertinent negatives include no chest pain, fever, headaches, numbness, visual change or weakness. He has tried neck support and oral narcotics (gabapentin, Hydrocodone) for the symptoms. The treatment provided moderate relief.   Back Pain   This is a chronic problem. The current episode started more than 1 year ago. The problem occurs constantly. The problem is unchanged. Radiates to: right hip. The pain is at a severity of 8/10. The pain is moderate. Pertinent negatives include no bladder incontinence, bowel incontinence, chest pain, dysuria, fever, headaches, numbness or weakness. He has tried analgesics (gabapentin, Hydrocodone) for the symptoms. The treatment provided moderate relief.      PEG Assessment   What number best describes your pain on average in the past week?7  What number best  "describes how, during the past week, pain has interfered with your enjoyment of life?8  What number best describes how, during the past week, pain has interfered with your general activity?  8    The following portions of the patient's history were reviewed and updated as appropriate: allergies, current medications, past family history, past medical history, past social history, past surgical history and problem list.    Review of Systems   Constitutional: Negative for chills and fever.   Respiratory: Negative for shortness of breath.    Cardiovascular: Negative for chest pain.   Gastrointestinal: Negative for bowel incontinence, constipation, diarrhea, nausea and vomiting.   Genitourinary: Negative for bladder incontinence, difficulty urinating and dysuria.   Musculoskeletal: Positive for back pain and neck pain.   Neurological: Negative for dizziness, weakness, light-headedness, numbness and headaches.   Psychiatric/Behavioral: Negative for confusion, hallucinations, self-injury, sleep disturbance and suicidal ideas. The patient is not nervous/anxious.        Vitals:    09/14/17 1048   BP: 110/68   Pulse: 79   Resp: 18   Temp: 98.1 °F (36.7 °C)   SpO2: 97%   Weight: 143 lb (64.9 kg)   Height: 69\" (175.3 cm)     Objective   Physical Exam   Constitutional: He is oriented to person, place, and time. Vital signs are normal. He appears well-developed and well-nourished. He is cooperative.   HENT:   Head: Normocephalic and atraumatic.   Nose: Nose normal.   Eyes: Conjunctivae and lids are normal.   Neck: Trachea normal. Neck supple. Muscular tenderness present. Decreased range of motion present.   Cardiovascular: Normal rate, regular rhythm and normal heart sounds.    Pulmonary/Chest: Effort normal and breath sounds normal. No respiratory distress.   Abdominal: Normal appearance.   Musculoskeletal:        Cervical back: He exhibits tenderness.        Lumbar back: He exhibits tenderness.   Mild tenderness to bilateral SI " joints     Neurological: He is alert and oriented to person, place, and time. Gait normal.   Reflex Scores:       Patellar reflexes are 1+ on the right side and 1+ on the left side.  Skin: Skin is warm, dry and intact.   Psychiatric: He has a normal mood and affect. His speech is normal and behavior is normal. Judgment and thought content normal. Cognition and memory are normal.   Nursing note and vitals reviewed.      Assessment/Plan   Tanya was seen today for back pain.    Diagnoses and all orders for this visit:    Chronic pain syndrome    Degeneration of intervertebral disc of lumbar region    Osteoarthritis of cervical spine, unspecified spinal osteoarthritis complication status    Neck pain    Lumbar radiculopathy    Cervical radiculopathy    Encounter for long-term (current) use of high-risk medication    Other orders  -     HYDROcodone-acetaminophen (NORCO) 7.5-325 MG per tablet; Take 1 tablet by mouth Every 6 (Six) Hours As Needed for Severe Pain  (6 day supply).      --- The urine drug screen confirmation from 7-10-17 has been reviewed and the result is appropriate based on patient history and SULEIMAN report  --- Refill Hydrocodone. Patient appears stable with current regimen. No adverse effects. Regarding continuation of opioids, there is no evidence of aberrant behavior or any red flags.  The patient continues with appropriate response to opioid therapy. ADL's remain intact by self.   --- Declines FABY/intervention at this time.  --- Follow-up 2 months or sooner if needed.       SULEIMAN REPORT    As part of the patient's treatment plan, I am prescribing controlled substances. The patient has been made aware of appropriate use of such medications, including potential risk of somnolence, limited ability to drive and/or work safely, and the potential for dependence or overdose. It has also bee made clear that these medications are for use by this patient only, without concomitant use of alcohol or other  substances unless prescribed.     Patient has completed prescribing agreement detailing terms of continued prescribing of controlled substances, including monitoring SULEIMAN reports, urine drug screening, and pill counts if necessary. The patient is aware that inappropriate use will results in cessation of prescribing such medications.    SULEIMAN report has been reviewed and scanned into the patient's chart.    Date of last SULEIMAN : 9-12-17    History and physical exam exhibit continued safe and appropriate use of controlled substances.      EMR Dragon/Transcription disclaimer:   Much of this encounter note is an electronic transcription/translation of spoken language to printed text. The electronic translation of spoken language may permit erroneous, or at times, nonsensical words or phrases to be inadvertently transcribed; Although I have reviewed the note for such errors, some may still exist.

## 2017-10-05 NOTE — TELEPHONE ENCOUNTER
Medication Refill Request    Date of phone call: 10/4/17    Medication being requested: Hydrocodone-apap 7.5 sig: q6hrs  Qty: 120    Date of last visit: 9/14/17    Date of last refill: 9/14/17    SULEIMAN up to date?: 9/13/17    Next Follow up?: 11/13/17    Any new pertinent information? (i.e, new medication allergies, new use of medications, change in patient's health or condition, non-compliance or inconsistency with prescribing agreement?): n/a

## 2017-10-09 RX ORDER — HYDROCODONE BITARTRATE AND ACETAMINOPHEN 7.5; 325 MG/1; MG/1
1 TABLET ORAL EVERY 6 HOURS PRN
Qty: 120 TABLET | Refills: 0 | Status: SHIPPED | OUTPATIENT
Start: 2017-10-09 | End: 2017-11-13 | Stop reason: SDUPTHER

## 2017-11-13 ENCOUNTER — OFFICE VISIT (OUTPATIENT)
Dept: PAIN MEDICINE | Facility: CLINIC | Age: 77
End: 2017-11-13

## 2017-11-13 VITALS
HEART RATE: 88 BPM | OXYGEN SATURATION: 97 % | TEMPERATURE: 96.8 F | DIASTOLIC BLOOD PRESSURE: 72 MMHG | WEIGHT: 143 LBS | RESPIRATION RATE: 16 BRPM | SYSTOLIC BLOOD PRESSURE: 115 MMHG | HEIGHT: 69 IN | BODY MASS INDEX: 21.18 KG/M2

## 2017-11-13 DIAGNOSIS — M51.36 DEGENERATION OF INTERVERTEBRAL DISC OF LUMBAR REGION: ICD-10-CM

## 2017-11-13 DIAGNOSIS — G89.4 CHRONIC PAIN SYNDROME: Primary | ICD-10-CM

## 2017-11-13 DIAGNOSIS — Z79.899 ENCOUNTER FOR LONG-TERM (CURRENT) USE OF HIGH-RISK MEDICATION: ICD-10-CM

## 2017-11-13 DIAGNOSIS — M47.812 OSTEOARTHRITIS OF CERVICAL SPINE, UNSPECIFIED SPINAL OSTEOARTHRITIS COMPLICATION STATUS: ICD-10-CM

## 2017-11-13 PROCEDURE — 99213 OFFICE O/P EST LOW 20 MIN: CPT | Performed by: NURSE PRACTITIONER

## 2017-11-13 RX ORDER — HYDROCODONE BITARTRATE AND ACETAMINOPHEN 7.5; 325 MG/1; MG/1
1 TABLET ORAL EVERY 6 HOURS PRN
Qty: 120 TABLET | Refills: 0 | Status: SHIPPED | OUTPATIENT
Start: 2017-11-13 | End: 2017-12-11 | Stop reason: SDUPTHER

## 2017-11-13 NOTE — PROGRESS NOTES
CHIEF COMPLAINT    F/U back pain. No changes to note, patient states pain goes up and down, controlled moderately with pain medication.    Subjective   Tanya Jimenes is a 77 y.o. male  who presents to the office for follow-up.He has a history of chronic neck and back pain which is unchanged since his last office visit. He has started PT at [facility]. It helps him temporarily.    Complains of pain in his neck and back. Today his pain is 6/10VAS. Describes the pain as continuous and unchanged.  Continues with Hydrocodone 7.5/325 4/day and gabapentin 800 mg 3/day(prescribed by Dr. Nuñez). Denies any side effects from the regimen. The regimen helps decrease his pain by half. Notes improved function and activity with the regimen.  ADL's by self.     He has had MIKI in the past with positive results.  However, he states he has had issues with his mouth getting blisters after his last round of injections.     He has a history of CKD and cannot tolerate NSAIDs.  Neck Pain    This is a chronic problem. The current episode started more than 1 year ago. The problem occurs constantly. The problem has been unchanged. The pain is at a severity of 6/10. The pain is moderate. The symptoms are aggravated by position and bending. Pertinent negatives include no chest pain, fever, headaches, numbness, visual change or weakness. He has tried neck support and oral narcotics (gabapentin, Hydrocodone) for the symptoms. The treatment provided moderate relief.   Back Pain   This is a chronic problem. The current episode started more than 1 year ago. The problem occurs constantly. The problem is unchanged. Radiates to: right hip. The pain is at a severity of 6/10. The pain is moderate. Pertinent negatives include no bladder incontinence, bowel incontinence, chest pain, dysuria, fever, headaches, numbness or weakness. He has tried analgesics (gabapentin, Hydrocodone) for the symptoms. The treatment provided moderate relief.      PEG  "Assessment   What number best describes your pain on average in the past week?6  What number best describes how, during the past week, pain has interfered with your enjoyment of life?7  What number best describes how, during the past week, pain has interfered with your general activity?  8    The following portions of the patient's history were reviewed and updated as appropriate: allergies, current medications, past family history, past medical history, past social history, past surgical history and problem list.    Review of Systems   Constitutional: Negative for chills and fever.   Respiratory: Negative for shortness of breath.    Cardiovascular: Negative for chest pain.   Gastrointestinal: Negative for bowel incontinence, constipation, diarrhea, nausea and vomiting.   Genitourinary: Negative for bladder incontinence, difficulty urinating and dysuria.   Musculoskeletal: Positive for back pain and neck pain.   Neurological: Negative for dizziness, weakness, light-headedness, numbness and headaches.   Psychiatric/Behavioral: Negative for agitation, confusion, hallucinations, self-injury, sleep disturbance and suicidal ideas. The patient is not nervous/anxious.        Vitals:    11/13/17 1229   BP: 115/72   Pulse: 88   Resp: 16   Temp: 96.8 °F (36 °C)   SpO2: 97%   Weight: 143 lb (64.9 kg)   Height: 69\" (175.3 cm)   PainSc:   6   PainLoc: Back     Objective   Physical Exam   Constitutional: He is oriented to person, place, and time. Vital signs are normal. He appears well-developed and well-nourished. He is cooperative.   HENT:   Head: Normocephalic and atraumatic.   Nose: Nose normal.   Eyes: Conjunctivae and lids are normal.   Neck: Trachea normal. Neck supple. Muscular tenderness present. Decreased range of motion present.   Cardiovascular: Normal rate, regular rhythm and normal heart sounds.    Pulmonary/Chest: Effort normal and breath sounds normal. No respiratory distress.   Abdominal: Normal appearance. "   Musculoskeletal:        Cervical back: He exhibits tenderness.        Lumbar back: He exhibits tenderness.   Mild tenderness to bilateral SI joints     Neurological: He is alert and oriented to person, place, and time. Gait normal.   Reflex Scores:       Patellar reflexes are 1+ on the right side and 1+ on the left side.  Skin: Skin is warm, dry and intact.   Psychiatric: He has a normal mood and affect. His speech is normal and behavior is normal. Judgment and thought content normal. Cognition and memory are normal.   Nursing note and vitals reviewed.      Assessment/Plan   Tanya was seen today for back pain.    Diagnoses and all orders for this visit:    Chronic pain syndrome    Degeneration of intervertebral disc of lumbar region    Osteoarthritis of cervical spine, unspecified spinal osteoarthritis complication status    Encounter for long-term (current) use of high-risk medication    Other orders  -     HYDROcodone-acetaminophen (NORCO) 7.5-325 MG per tablet; Take 1 tablet by mouth Every 6 (Six) Hours As Needed for Severe Pain .      --- The urine drug screen confirmation from 7-10-17 has been reviewed and the result is appropriate based on patient history and SULEIMAN report  --- Refill Hydrocodone. Patient appears stable with current regimen. No adverse effects. Regarding continuation of opioids, there is no evidence of aberrant behavior or any red flags.  The patient continues with appropriate response to opioid therapy. ADL's remain intact by self.   --- Discussed updating MRI. Patient declined. Discussed interventions. Patient declined at this time.   --- Follow-up 2 months or sooner if needed.       SULEIMAN REPORT    As part of the patient's treatment plan, I am prescribing controlled substances. The patient has been made aware of appropriate use of such medications, including potential risk of somnolence, limited ability to drive and/or work safely, and the potential for dependence or overdose. It has  also bee made clear that these medications are for use by this patient only, without concomitant use of alcohol or other substances unless prescribed.     Patient has completed prescribing agreement detailing terms of continued prescribing of controlled substances, including monitoring SULEIMAN reports, urine drug screening, and pill counts if necessary. The patient is aware that inappropriate use will results in cessation of prescribing such medications.    SULEIMAN report has been reviewed and scanned into the patient's chart.    Date of last SULEIMAN : 11-10-17    History and physical exam exhibit continued safe and appropriate use of controlled substances.      EMR Dragon/Transcription disclaimer:   Much of this encounter note is an electronic transcription/translation of spoken language to printed text. The electronic translation of spoken language may permit erroneous, or at times, nonsensical words or phrases to be inadvertently transcribed; Although I have reviewed the note for such errors, some may still exist.

## 2017-12-11 RX ORDER — HYDROCODONE BITARTRATE AND ACETAMINOPHEN 7.5; 325 MG/1; MG/1
1 TABLET ORAL EVERY 6 HOURS PRN
Qty: 120 TABLET | Refills: 0 | Status: SHIPPED | OUTPATIENT
Start: 2017-12-11 | End: 2018-01-11 | Stop reason: SDUPTHER

## 2017-12-11 NOTE — TELEPHONE ENCOUNTER
Medication Refill Request    Date of phone call: 12/6/17    Medication being requested: Hydrocodone-apap 7.5 sig: q6hrs  Qty: 120    Date of last visit: 11/13/17    Date of last refill: 11/13/17    SULEIMAN up to date?: 11/10/17    Next Follow up?: 1/11/2018    Any new pertinent information? (i.e, new medication allergies, new use of medications, change in patient's health or condition, non-compliance or inconsistency with prescribing agreement?): n/a

## 2018-01-11 ENCOUNTER — OFFICE VISIT (OUTPATIENT)
Dept: PAIN MEDICINE | Facility: CLINIC | Age: 78
End: 2018-01-11

## 2018-01-11 VITALS
HEART RATE: 94 BPM | SYSTOLIC BLOOD PRESSURE: 117 MMHG | WEIGHT: 143.2 LBS | OXYGEN SATURATION: 94 % | HEIGHT: 69 IN | RESPIRATION RATE: 16 BRPM | DIASTOLIC BLOOD PRESSURE: 77 MMHG | BODY MASS INDEX: 21.21 KG/M2 | TEMPERATURE: 97.3 F

## 2018-01-11 DIAGNOSIS — M54.2 NECK PAIN: ICD-10-CM

## 2018-01-11 DIAGNOSIS — M47.812 OSTEOARTHRITIS OF CERVICAL SPINE, UNSPECIFIED SPINAL OSTEOARTHRITIS COMPLICATION STATUS: ICD-10-CM

## 2018-01-11 DIAGNOSIS — Z79.899 ENCOUNTER FOR LONG-TERM (CURRENT) USE OF HIGH-RISK MEDICATION: ICD-10-CM

## 2018-01-11 DIAGNOSIS — M51.36 DEGENERATION OF INTERVERTEBRAL DISC OF LUMBAR REGION: ICD-10-CM

## 2018-01-11 DIAGNOSIS — G89.4 CHRONIC PAIN SYNDROME: Primary | ICD-10-CM

## 2018-01-11 PROCEDURE — 99213 OFFICE O/P EST LOW 20 MIN: CPT | Performed by: NURSE PRACTITIONER

## 2018-01-11 RX ORDER — HYDROCODONE BITARTRATE AND ACETAMINOPHEN 7.5; 325 MG/1; MG/1
1 TABLET ORAL EVERY 6 HOURS PRN
Qty: 120 TABLET | Refills: 0 | Status: SHIPPED | OUTPATIENT
Start: 2018-01-11 | End: 2018-02-06 | Stop reason: SDUPTHER

## 2018-01-11 RX ORDER — COLESEVELAM 180 1/1
1875 TABLET ORAL AS NEEDED
COMMUNITY

## 2018-01-11 NOTE — PROGRESS NOTES
CHIEF COMPLAINT    F/U back pain. Pt states unchanged, it gets worse and then better. Helped by pain medication.     Subjective   Tanya Jimenes is a 77 y.o. male  who presents to the office for follow-up.He has a history of chronic back and neck pain, which he reports as being unchanged since last office visit.    Complains of pain in his neck and back. Today his pain is 5/10VAS. Describes the pain as continuous and unchanged. Continues with Hydrocodone 7.5/325 4/day and gabapentin 800 mg 2/day(prescribed by Dr. Nuñez). Denies any side effects from the regimen. The regimen helps decrease his pain moderately.  ADL's by self.     He has had MIKI in the past with positive results.  However, he states he has had issues with his mouth getting blisters after his last round of injections.    Neck Pain    This is a chronic problem. The current episode started more than 1 year ago. The problem occurs constantly. The pain is at a severity of 5/10. The pain is moderate. The symptoms are aggravated by position and bending. Pertinent negatives include no chest pain, fever, headaches, numbness, visual change or weakness. He has tried neck support and oral narcotics (gabapentin, Hydrocodone) for the symptoms. The treatment provided moderate relief.   Back Pain   This is a chronic problem. The current episode started more than 1 year ago. The problem occurs constantly. Progression since onset: unchanged from last office visit. Radiates to: right hip. The pain is at a severity of 5/10. The pain is moderate. Pertinent negatives include no bladder incontinence, bowel incontinence, chest pain, dysuria, fever, headaches, numbness or weakness. He has tried analgesics (gabapentin, Hydrocodone) for the symptoms. The treatment provided moderate relief.        PEG Assessment   What number best describes your pain on average in the past week?6  What number best describes how, during the past week, pain has interfered with your enjoyment of  "life?7  What number best describes how, during the past week, pain has interfered with your general activity?  7    The following portions of the patient's history were reviewed and updated as appropriate: allergies, current medications, past family history, past medical history, past social history, past surgical history and problem list.    Review of Systems   Constitutional: Negative for chills and fever.   HENT: Positive for ear pain (pt states he had an ear ache last night) and hearing loss.    Respiratory: Negative for shortness of breath.    Cardiovascular: Negative for chest pain.   Gastrointestinal: Negative for bowel incontinence, constipation, diarrhea (pt on welchol), nausea and vomiting.   Genitourinary: Negative for bladder incontinence, difficulty urinating and dysuria.   Musculoskeletal: Positive for back pain and neck pain.   Neurological: Negative for dizziness, weakness, light-headedness, numbness and headaches.   Psychiatric/Behavioral: Positive for sleep disturbance. Negative for agitation, confusion, hallucinations, self-injury and suicidal ideas. The patient is not nervous/anxious.        Vitals:    01/11/18 1224   BP: 117/77   Pulse: 94   Resp: 16   Temp: 97.3 °F (36.3 °C)   SpO2: 94%   Weight: 65 kg (143 lb 3.2 oz)   Height: 175.3 cm (69.02\")   PainSc:   5   PainLoc: Back     Objective   Physical Exam   Constitutional: He is oriented to person, place, and time. Vital signs are normal. He appears well-developed and well-nourished. He is cooperative.   HENT:   Head: Normocephalic and atraumatic.   Nose: Nose normal.   Eyes: Conjunctivae and lids are normal.   Neck: Trachea normal. Neck supple. Muscular tenderness present. Decreased range of motion present.   Cardiovascular: Normal rate.    Pulmonary/Chest: Effort normal.   Abdominal: Normal appearance.   Musculoskeletal:        Cervical back: He exhibits tenderness.        Lumbar back: He exhibits tenderness.   Mild tenderness to bilateral SI " joints     Neurological: He is alert and oriented to person, place, and time. Gait normal.   Reflex Scores:       Patellar reflexes are 1+ on the right side and 1+ on the left side.  Skin: Skin is warm, dry and intact.   Psychiatric: He has a normal mood and affect. His speech is normal and behavior is normal. Judgment and thought content normal. Cognition and memory are normal.   Nursing note and vitals reviewed.      Assessment/Plan   Tanya was seen today for back pain.    Diagnoses and all orders for this visit:    Chronic pain syndrome    Osteoarthritis of cervical spine, unspecified spinal osteoarthritis complication status    Degeneration of intervertebral disc of lumbar region    Neck pain    Encounter for long-term (current) use of high-risk medication    Other orders  -     HYDROcodone-acetaminophen (NORCO) 7.5-325 MG per tablet; Take 1 tablet by mouth Every 6 (Six) Hours As Needed for Severe Pain .      --- The urine drug screen confirmation from 7-10-17 has been reviewed and the result is appropriate based on patient history and SULEIMAN report  --- refill Hydrocodone. Patient appears stable with current regimen. No adverse effects. Regarding continuation of opioids, there is no evidence of aberrant behavior or any red flags.  The patient continues with appropriate response to opioid therapy. ADL's remain intact by self.   --- Follow-up 2 months or sooner if needed.       SULEIMAN REPORT    As part of the patient's treatment plan, I am prescribing controlled substances. The patient has been made aware of appropriate use of such medications, including potential risk of somnolence, limited ability to drive and/or work safely, and the potential for dependence or overdose. It has also bee made clear that these medications are for use by this patient only, without concomitant use of alcohol or other substances unless prescribed.     Patient has completed prescribing agreement detailing terms of continued  prescribing of controlled substances, including monitoring SULEIMAN reports, urine drug screening, and pill counts if necessary. The patient is aware that inappropriate use will results in cessation of prescribing such medications.    SULEIMAN report has been reviewed and scanned into the patient's chart.    Date of last SULEIMAN : 1-9-18    History and physical exam exhibit continued safe and appropriate use of controlled substances.      EMR Dragon/Transcription disclaimer:   Much of this encounter note is an electronic transcription/translation of spoken language to printed text. The electronic translation of spoken language may permit erroneous, or at times, nonsensical words or phrases to be inadvertently transcribed; Although I have reviewed the note for such errors, some may still exist.

## 2018-02-06 RX ORDER — HYDROCODONE BITARTRATE AND ACETAMINOPHEN 7.5; 325 MG/1; MG/1
1 TABLET ORAL EVERY 6 HOURS PRN
Qty: 120 TABLET | Refills: 0 | Status: SHIPPED | OUTPATIENT
Start: 2018-02-06 | End: 2018-03-08 | Stop reason: SDUPTHER

## 2018-02-06 NOTE — TELEPHONE ENCOUNTER
Medication Refill Request    Date of phone call: 2/5/18    Medication being requested: Hydrocodone-apap 7.5 sig: q6hrs  Qty: 120    Date of last visit: 1/11/18    Date of last refill: 1/10/18    SULEIMAN up to date?: 1/10/18    Next Follow up?: 3/8/18    Any new pertinent information? (i.e, new medication allergies, new use of medications, change in patient's health or condition, non-compliance or inconsistency with prescribing agreement?): n/a

## 2018-03-08 ENCOUNTER — OFFICE VISIT (OUTPATIENT)
Dept: PAIN MEDICINE | Facility: CLINIC | Age: 78
End: 2018-03-08

## 2018-03-08 VITALS
TEMPERATURE: 98.3 F | WEIGHT: 142 LBS | DIASTOLIC BLOOD PRESSURE: 76 MMHG | BODY MASS INDEX: 21.03 KG/M2 | HEIGHT: 69 IN | RESPIRATION RATE: 18 BRPM | SYSTOLIC BLOOD PRESSURE: 115 MMHG | OXYGEN SATURATION: 93 % | HEART RATE: 95 BPM

## 2018-03-08 DIAGNOSIS — M54.16 LUMBAR RADICULOPATHY: ICD-10-CM

## 2018-03-08 DIAGNOSIS — M51.36 DEGENERATION OF INTERVERTEBRAL DISC OF LUMBAR REGION: ICD-10-CM

## 2018-03-08 DIAGNOSIS — G89.4 CHRONIC PAIN SYNDROME: Primary | ICD-10-CM

## 2018-03-08 DIAGNOSIS — M47.812 OSTEOARTHRITIS OF CERVICAL SPINE, UNSPECIFIED SPINAL OSTEOARTHRITIS COMPLICATION STATUS: ICD-10-CM

## 2018-03-08 DIAGNOSIS — Z79.899 ENCOUNTER FOR LONG-TERM (CURRENT) USE OF HIGH-RISK MEDICATION: ICD-10-CM

## 2018-03-08 PROCEDURE — 99213 OFFICE O/P EST LOW 20 MIN: CPT | Performed by: NURSE PRACTITIONER

## 2018-03-08 RX ORDER — HYDROCODONE BITARTRATE AND ACETAMINOPHEN 7.5; 325 MG/1; MG/1
1 TABLET ORAL EVERY 6 HOURS PRN
Qty: 120 TABLET | Refills: 0 | Status: SHIPPED | OUTPATIENT
Start: 2018-03-08 | End: 2018-04-03 | Stop reason: SDUPTHER

## 2018-03-08 NOTE — PROGRESS NOTES
CHIEF COMPLAINT  Back pain is unchanged since last visit.    Subjective   Tanya Jimenes is a 77 y.o. male  who presents to the office for follow-up.He has a history of back and neck pain, reports his pain is unchanged since last office visit.    Complains of pain in his neck and back. Today his pain is 6-7/10VAS. Describes the pain as continuous and unchanged.  Pain increases with certain position and activity; pain decreases with medication and rest. Continues with Hydrocodone 7.5/325 4/day and gabapentin 800 mg 2/day(prescribed by Dr. Nuñez). Denies any side effects from the regimen. The regimen helps decrease his pain moderately.  ADL's by self.      Presents with wife.  He has had MIKI in the past with positive results.  However, he states he has had issues with his mouth getting blisters after his last round of injections.  Cannot take NSAIDs due to CKD.   Neck Pain    This is a chronic problem. The current episode started more than 1 year ago. The problem occurs constantly. The pain is at a severity of 5/10. The pain is moderate. The symptoms are aggravated by position and bending. Pertinent negatives include no chest pain, fever, headaches, numbness, visual change or weakness. He has tried neck support and oral narcotics (gabapentin, Hydrocodone) for the symptoms. The treatment provided moderate relief.   Back Pain   This is a chronic problem. The current episode started more than 1 year ago. The problem occurs constantly. Progression since onset: unchanged from last office visit. Radiates to: right hip. The pain is at a severity of 5/10. The pain is moderate. Pertinent negatives include no bladder incontinence, bowel incontinence, chest pain, dysuria, fever, headaches, numbness or weakness. He has tried analgesics (gabapentin, Hydrocodone) for the symptoms. The treatment provided moderate relief.      PEG Assessment   What number best describes your pain on average in the past week?5  What number best  "describes how, during the past week, pain has interfered with your enjoyment of life?7  What number best describes how, during the past week, pain has interfered with your general activity?  7    The following portions of the patient's history were reviewed and updated as appropriate: allergies, current medications, past family history, past medical history, past social history, past surgical history and problem list.    Review of Systems   Constitutional: Negative for chills and fever.   Respiratory: Negative for shortness of breath.    Cardiovascular: Negative for chest pain.   Gastrointestinal: Negative for bowel incontinence, constipation, diarrhea, nausea and vomiting.   Genitourinary: Negative for bladder incontinence, difficulty urinating, dysuria and enuresis.   Musculoskeletal: Positive for back pain and neck pain.   Neurological: Negative for dizziness, weakness, light-headedness, numbness and headaches.   Psychiatric/Behavioral: Positive for sleep disturbance. Negative for confusion, hallucinations, self-injury and suicidal ideas. The patient is not nervous/anxious.        Vitals:    03/08/18 1252   BP: 115/76   Pulse: 95   Resp: 18   Temp: 98.3 °F (36.8 °C)   SpO2: 93%   Weight: 64.4 kg (142 lb)   Height: 175.3 cm (69\")     Objective   Physical Exam   Constitutional: He is oriented to person, place, and time. Vital signs are normal. He appears well-developed and well-nourished. He is cooperative.   HENT:   Head: Normocephalic and atraumatic.   Nose: Nose normal.   Eyes: Conjunctivae and lids are normal.   Neck: Trachea normal. Neck supple. Muscular tenderness present. Decreased range of motion present.   Cardiovascular: Normal rate.    Pulmonary/Chest: Effort normal.   Abdominal: Normal appearance.   Musculoskeletal:        Cervical back: He exhibits tenderness.        Lumbar back: He exhibits tenderness.   Mild tenderness to bilateral SI joints     Neurological: He is alert and oriented to person, " place, and time. Gait normal.   Reflex Scores:       Patellar reflexes are 1+ on the right side and 1+ on the left side.  Skin: Skin is warm, dry and intact.   Psychiatric: He has a normal mood and affect. His speech is normal and behavior is normal. Judgment and thought content normal. Cognition and memory are normal.   Nursing note and vitals reviewed.      Assessment/Plan   Tanya was seen today for back pain.    Diagnoses and all orders for this visit:    Chronic pain syndrome    Degeneration of intervertebral disc of lumbar region    Lumbar radiculopathy    Osteoarthritis of cervical spine, unspecified spinal osteoarthritis complication status    Encounter for long-term (current) use of high-risk medication    Other orders  -     HYDROcodone-acetaminophen (NORCO) 7.5-325 MG per tablet; Take 1 tablet by mouth Every 6 (Six) Hours As Needed for Severe Pain .      --- The urine drug screen confirmation from 7-10-17 has been reviewed and the result is appropriate based on patient history and SULEIMAN report  --- refill Hydrocodone. Patient appears stable with current regimen. No adverse effects. Regarding continuation of opioids, there is no evidence of aberrant behavior or any red flags.  The patient continues with appropriate response to opioid therapy. ADL's remain intact by self.   --- Declines cervical or lumbar injections  --- Follow-up 2 months          SULEIMAN REPORT    As part of the patient's treatment plan, I am prescribing controlled substances. The patient has been made aware of appropriate use of such medications, including potential risk of somnolence, limited ability to drive and/or work safely, and the potential for dependence or overdose. It has also bee made clear that these medications are for use by this patient only, without concomitant use of alcohol or other substances unless prescribed.     Patient has completed prescribing agreement detailing terms of continued prescribing of controlled  substances, including monitoring SULEIMAN reports, urine drug screening, and pill counts if necessary. The patient is aware that inappropriate use will results in cessation of prescribing such medications.    SULEIMAN report has been reviewed and scanned into the patient's chart.    Date of last SULEIMAN : 3/7/2018    History and physical exam exhibit continued safe and appropriate use of controlled substances.    EMR Dragon/Transcription disclaimer:   Much of this encounter note is an electronic transcription/translation of spoken language to printed text. The electronic translation of spoken language may permit erroneous, or at times, nonsensical words or phrases to be inadvertently transcribed; Although I have reviewed the note for such errors, some may still exist.

## 2018-04-03 RX ORDER — HYDROCODONE BITARTRATE AND ACETAMINOPHEN 7.5; 325 MG/1; MG/1
1 TABLET ORAL EVERY 6 HOURS PRN
Qty: 120 TABLET | Refills: 0 | Status: SHIPPED | OUTPATIENT
Start: 2018-04-03 | End: 2018-05-07 | Stop reason: SDUPTHER

## 2018-04-03 NOTE — TELEPHONE ENCOUNTER
Medication Refill Request    Date of phone call: 4-2-18    Medication being requested: Hydrocodone-apap 7.5-325 mg sig: Take 1 tablet by mouth every 6 hours as needed for severe pain  Qty: 120 tablets    Date of last visit: 3-08-18    Date of last refill: 3-08-18    SULEIMAN up to date?: yes, 3-07-18    Next Follow up?: 5-7-18    Any new pertinent information? (i.e, new medication allergies, new use of medications, change in patient's health or condition, non-compliance or inconsistency with prescribing agreement?): n/a

## 2018-05-07 ENCOUNTER — RESULTS ENCOUNTER (OUTPATIENT)
Dept: PAIN MEDICINE | Facility: CLINIC | Age: 78
End: 2018-05-07

## 2018-05-07 ENCOUNTER — OFFICE VISIT (OUTPATIENT)
Dept: PAIN MEDICINE | Facility: CLINIC | Age: 78
End: 2018-05-07

## 2018-05-07 VITALS
HEART RATE: 95 BPM | HEIGHT: 69 IN | SYSTOLIC BLOOD PRESSURE: 104 MMHG | WEIGHT: 137 LBS | OXYGEN SATURATION: 92 % | TEMPERATURE: 98.7 F | RESPIRATION RATE: 18 BRPM | DIASTOLIC BLOOD PRESSURE: 72 MMHG | BODY MASS INDEX: 20.29 KG/M2

## 2018-05-07 DIAGNOSIS — Z79.899 ENCOUNTER FOR LONG-TERM (CURRENT) USE OF HIGH-RISK MEDICATION: ICD-10-CM

## 2018-05-07 DIAGNOSIS — M54.16 LUMBAR RADICULOPATHY: ICD-10-CM

## 2018-05-07 DIAGNOSIS — G89.4 CHRONIC PAIN SYNDROME: Primary | ICD-10-CM

## 2018-05-07 DIAGNOSIS — M47.812 OSTEOARTHRITIS OF CERVICAL SPINE, UNSPECIFIED SPINAL OSTEOARTHRITIS COMPLICATION STATUS: ICD-10-CM

## 2018-05-07 DIAGNOSIS — M51.36 DEGENERATION OF INTERVERTEBRAL DISC OF LUMBAR REGION: ICD-10-CM

## 2018-05-07 DIAGNOSIS — M54.12 CERVICAL RADICULOPATHY: ICD-10-CM

## 2018-05-07 DIAGNOSIS — G89.4 CHRONIC PAIN SYNDROME: ICD-10-CM

## 2018-05-07 LAB
POC AMPHETAMINES: NEGATIVE
POC BARBITURATES: NEGATIVE
POC BENZODIAZEPHINES: NEGATIVE
POC COCAINE: NEGATIVE
POC METHADONE: NEGATIVE
POC METHAMPHETAMINE SCREEN URINE: NEGATIVE
POC OPIATES: POSITIVE
POC OXYCODONE: NEGATIVE
POC PHENCYCLIDINE: NEGATIVE
POC PROPOXYPHENE: NEGATIVE
POC THC: NEGATIVE
POC TRICYCLIC ANTIDEPRESSANTS: POSITIVE

## 2018-05-07 PROCEDURE — 99213 OFFICE O/P EST LOW 20 MIN: CPT | Performed by: NURSE PRACTITIONER

## 2018-05-07 PROCEDURE — 80305 DRUG TEST PRSMV DIR OPT OBS: CPT | Performed by: NURSE PRACTITIONER

## 2018-05-07 RX ORDER — HYDROCODONE BITARTRATE AND ACETAMINOPHEN 7.5; 325 MG/1; MG/1
1 TABLET ORAL EVERY 6 HOURS PRN
Qty: 120 TABLET | Refills: 0 | Status: SHIPPED | OUTPATIENT
Start: 2018-05-07 | End: 2018-06-05 | Stop reason: SDUPTHER

## 2018-05-07 NOTE — PROGRESS NOTES
CHIEF COMPLAINT  Back pain is unchanged since last visit.    Subjective   Tanya Jimenes is a 78 y.o. male  who presents to the office for follow-up.He has a history of chronic neck and back pain. Reports this is unchanged from last office visit.    Complains of pain in his neck and back. Today his pain is 6/10VAS. Describes the pain as continuous and unchanged. Is having some anterior thigh pain, bilateral arm pain. WAs recently in hospital for pneumonia for 2-3 days. Was discharged a few weeks ago. Continues with Hydrocodone 7.5/325 4/day and gabapentin 800 mg 2/day(prescribed by Dr. Nuñez). Denies any side effects from the regimen. The regimen helps decrease his pain moderately.  ADL's by self.      Presents with wife.  He has had MIKI in the past with positive results.  However, he states he has had issues with his mouth getting blisters after his last round of injections.  Cannot take NSAIDs due to CKD.     Neck Pain    This is a chronic problem. The current episode started more than 1 year ago. The problem occurs constantly. The pain is at a severity of 6/10. The pain is moderate. The symptoms are aggravated by position and bending. Associated symptoms include weakness. Pertinent negatives include no chest pain, fever, headaches, numbness or visual change. He has tried neck support and oral narcotics (gabapentin, Hydrocodone) for the symptoms. The treatment provided moderate relief.   Back Pain   This is a chronic problem. The current episode started more than 1 year ago. The problem occurs constantly. Progression since onset: unchanged from last office visit. Radiates to: right hip. The pain is at a severity of 6/10. The pain is moderate. Associated symptoms include weakness. Pertinent negatives include no bladder incontinence, bowel incontinence, chest pain, dysuria, fever, headaches or numbness. He has tried analgesics (gabapentin, Hydrocodone) for the symptoms. The treatment provided moderate relief.     "  PEG Assessment   What number best describes your pain on average in the past week?6  What number best describes how, during the past week, pain has interfered with your enjoyment of life?8  What number best describes how, during the past week, pain has interfered with your general activity?  8    The following portions of the patient's history were reviewed and updated as appropriate: allergies, current medications, past family history, past medical history, past social history, past surgical history and problem list.    Review of Systems   Constitutional: Negative for chills and fever.   Respiratory: Positive for shortness of breath.    Cardiovascular: Negative for chest pain.   Gastrointestinal: Positive for constipation. Negative for bowel incontinence, diarrhea, nausea and vomiting.   Genitourinary: Negative for bladder incontinence, difficulty urinating, dysuria and enuresis.   Musculoskeletal: Positive for back pain and neck pain.   Neurological: Positive for weakness. Negative for dizziness, light-headedness, numbness and headaches.   Psychiatric/Behavioral: Positive for sleep disturbance. Negative for confusion, hallucinations, self-injury and suicidal ideas. The patient is not nervous/anxious.        Vitals:    05/07/18 1338   BP: 104/72   Pulse: 95   Resp: 18   Temp: 98.7 °F (37.1 °C)   SpO2: 92%   Weight: 62.1 kg (137 lb)   Height: 175.3 cm (69\")   PainSc:   6   PainLoc: Back     Objective   Physical Exam   Constitutional: He is oriented to person, place, and time. Vital signs are normal. He appears well-developed and well-nourished. He is cooperative.   HENT:   Head: Normocephalic and atraumatic.   Nose: Nose normal.   Eyes: Conjunctivae and lids are normal.   Neck: Trachea normal. Neck supple. Muscular tenderness present. Decreased range of motion present.   Cardiovascular: Normal rate.    Pulmonary/Chest: Effort normal.   Abdominal: Normal appearance.   Musculoskeletal:        Cervical back: He " exhibits tenderness.        Lumbar back: He exhibits tenderness.   Mild tenderness to bilateral SI joints     Neurological: He is alert and oriented to person, place, and time. Gait normal.   Reflex Scores:       Patellar reflexes are 1+ on the right side and 1+ on the left side.  Skin: Skin is warm, dry and intact.   Psychiatric: He has a normal mood and affect. His speech is normal and behavior is normal. Judgment and thought content normal. Cognition and memory are normal.   Nursing note and vitals reviewed.      Assessment/Plan   Tanya was seen today for back pain.    Diagnoses and all orders for this visit:    Chronic pain syndrome  -     Urine Drug Screen Confirmation - Urine, Urine, Clean Catch; Future  -     POC Urine Drug Screen, Triage    Degeneration of intervertebral disc of lumbar region    Lumbar radiculopathy    Osteoarthritis of cervical spine, unspecified spinal osteoarthritis complication status    Cervical radiculopathy    Encounter for long-term (current) use of high-risk medication    Other orders  -     HYDROcodone-acetaminophen (NORCO) 7.5-325 MG per tablet; Take 1 tablet by mouth Every 6 (Six) Hours As Needed for Severe Pain .      --- Routine UDS in office today as part of monitoring requirements for controlled substances.  The specimen was viewed and the immunoassay result reviewed and is +OPI, +TCA(APPROPRIATE).  This specimen will be sent to [facility] for confirmation.     --- Refill Hydrocodone. Patient appears stable with current regimen. No adverse effects. Regarding continuation of opioids, there is no evidence of aberrant behavior or any red flags.  The patient continues with appropriate response to opioid therapy. ADL's remain intact by self.   --- Follow-up 2 months or sooner if needed.       SULEIMAN REPORT    As part of the patient's treatment plan, I am prescribing controlled substances. The patient has been made aware of appropriate use of such medications, including  potential risk of somnolence, limited ability to drive and/or work safely, and the potential for dependence or overdose. It has also bee made clear that these medications are for use by this patient only, without concomitant use of alcohol or other substances unless prescribed.     Patient has completed prescribing agreement detailing terms of continued prescribing of controlled substances, including monitoring SULEIMAN reports, urine drug screening, and pill counts if necessary. The patient is aware that inappropriate use will results in cessation of prescribing such medications.    SULEIMAN report has been reviewed and scanned into the patient's chart.    Date of last SULEIMAN : 5-4-18    History and physical exam exhibit continued safe and appropriate use of controlled substances.      EMR Dragon/Transcription disclaimer:   Much of this encounter note is an electronic transcription/translation of spoken language to printed text. The electronic translation of spoken language may permit erroneous, or at times, nonsensical words or phrases to be inadvertently transcribed; Although I have reviewed the note for such errors, some may still exist.

## 2018-06-05 RX ORDER — HYDROCODONE BITARTRATE AND ACETAMINOPHEN 7.5; 325 MG/1; MG/1
1 TABLET ORAL EVERY 6 HOURS PRN
Qty: 120 TABLET | Refills: 0 | Status: SHIPPED | OUTPATIENT
Start: 2018-06-05 | End: 2018-07-03 | Stop reason: SDUPTHER

## 2018-06-05 NOTE — TELEPHONE ENCOUNTER
Medication Refill Request    Date of phone call: 6/5/18    Medication being requested: Hydrocodone-apap 7.5 mg sig: q6hrs  Qty: 120    Date of last visit: 5/7/18    Date of last refill: 5/7/18    SULEIMAN up to date?: 5/4/18    Next Follow up?: 7/3/18    Any new pertinent information? (i.e, new medication allergies, new use of medications, change in patient's health or condition, non-compliance or inconsistency with prescribing agreement?): patient states he didn't know he still had to call 7 days prior to running out of medication. He is here to  a script and states that he is out of medication.

## 2018-07-03 ENCOUNTER — OFFICE VISIT (OUTPATIENT)
Dept: PAIN MEDICINE | Facility: CLINIC | Age: 78
End: 2018-07-03

## 2018-07-03 VITALS
HEIGHT: 69 IN | SYSTOLIC BLOOD PRESSURE: 109 MMHG | WEIGHT: 139 LBS | RESPIRATION RATE: 18 BRPM | HEART RATE: 100 BPM | OXYGEN SATURATION: 94 % | DIASTOLIC BLOOD PRESSURE: 69 MMHG | BODY MASS INDEX: 20.59 KG/M2 | TEMPERATURE: 98.4 F

## 2018-07-03 DIAGNOSIS — M47.812 OSTEOARTHRITIS OF CERVICAL SPINE, UNSPECIFIED SPINAL OSTEOARTHRITIS COMPLICATION STATUS: ICD-10-CM

## 2018-07-03 DIAGNOSIS — Z79.899 ENCOUNTER FOR LONG-TERM (CURRENT) USE OF HIGH-RISK MEDICATION: ICD-10-CM

## 2018-07-03 DIAGNOSIS — M51.36 DEGENERATION OF INTERVERTEBRAL DISC OF LUMBAR REGION: ICD-10-CM

## 2018-07-03 DIAGNOSIS — G89.4 CHRONIC PAIN SYNDROME: Primary | ICD-10-CM

## 2018-07-03 PROCEDURE — 99214 OFFICE O/P EST MOD 30 MIN: CPT | Performed by: NURSE PRACTITIONER

## 2018-07-03 RX ORDER — HYDROCODONE BITARTRATE AND ACETAMINOPHEN 7.5; 325 MG/1; MG/1
1 TABLET ORAL EVERY 6 HOURS PRN
Qty: 120 TABLET | Refills: 0 | Status: SHIPPED | OUTPATIENT
Start: 2018-07-03 | End: 2018-07-31 | Stop reason: SDUPTHER

## 2018-07-03 NOTE — PROGRESS NOTES
"CHIEF COMPLAINT  Back pain is unchanged since last visit.    Subjective   Tanya Jimenes is a 78 y.o. male  who presents to the office for follow-up.He has a history of chronic neck and back pain. Reports this is unchanged from last office visit.    Complains of pain in his neck and back. Today his pain is 6/10VAS. Describes the pain as continuous and unchanged. Pain worsens as day progresses. Continues with Hydrocodone 7.5/325 4/day and gabapentin 800 mg 3/day(prescribed by Dr. Nuñez). Denies any side effects from the regimen. The regimen helps decrease his pain moderately.  \"i feel better and I can walk better.\" ADL's by self.   Is now taking Pantoprazole 20 mg daily, Cetrizine 10 mg daily, Simethicone 80 mg daily and Levemir 12 units once day.  He has had MIKI in the past with positive results.  However, he states he has had issues with his mouth getting blisters after his last round of injections.  Cannot take NSAIDs due to CKD.   Presents with wife.  Neck Pain    This is a chronic problem. The current episode started more than 1 year ago. The problem occurs constantly. Progression since onset: unchanged from last office visit. The pain is at a severity of 6/10. The pain is moderate. The symptoms are aggravated by position and bending. Pertinent negatives include no chest pain, fever, headaches, numbness, visual change or weakness. He has tried neck support and oral narcotics (gabapentin, Hydrocodone) for the symptoms. The treatment provided moderate relief.   Back Pain   This is a chronic problem. The current episode started more than 1 year ago. The problem occurs constantly. Progression since onset: unchanged from last office visit. The pain is at a severity of 6/10. The pain is moderate. Pertinent negatives include no bladder incontinence, bowel incontinence, chest pain, dysuria, fever, headaches, numbness or weakness. He has tried analgesics (gabapentin, Hydrocodone) for the symptoms. The treatment provided " "moderate relief.    I have reviewed the above HPI today, 07/03/18.  The HPI is otherwise unchanged from the previous office visit.      PEG Assessment   What number best describes your pain on average in the past week?6  What number best describes how, during the past week, pain has interfered with your enjoyment of life?7  What number best describes how, during the past week, pain has interfered with your general activity?  7    The following portions of the patient's history were reviewed and updated as appropriate: allergies, current medications, past family history, past medical history, past social history, past surgical history and problem list.    Review of Systems   Constitutional: Negative for chills and fever.   Respiratory: Negative for shortness of breath.    Cardiovascular: Negative for chest pain.   Gastrointestinal: Negative for bowel incontinence, constipation, diarrhea, nausea and vomiting.   Genitourinary: Negative for bladder incontinence, difficulty urinating, dysuria and enuresis.   Musculoskeletal: Positive for back pain and neck pain.   Neurological: Negative for dizziness, weakness, light-headedness, numbness and headaches.   Psychiatric/Behavioral: Negative for confusion, hallucinations, self-injury, sleep disturbance and suicidal ideas. The patient is not nervous/anxious.        Vitals:    07/03/18 1230   BP: 109/69   Pulse: 100   Resp: 18   Temp: 98.4 °F (36.9 °C)   SpO2: 94%   Weight: 63 kg (139 lb)   Height: 175.3 cm (69\")   PainSc:   6   PainLoc: Back     Objective   Physical Exam   Constitutional: He is oriented to person, place, and time. Vital signs are normal. He appears well-developed and well-nourished. He is cooperative.   HENT:   Head: Normocephalic and atraumatic.   Nose: Nose normal.   Eyes: Conjunctivae and lids are normal.   Neck: Trachea normal. Neck supple. Muscular tenderness present. Decreased range of motion present.   Cardiovascular: Normal rate.    Pulmonary/Chest: " Effort normal.   Abdominal: Normal appearance.   Musculoskeletal:        Cervical back: He exhibits tenderness.        Lumbar back: He exhibits tenderness.        Neurological: He is alert and oriented to person, place, and time. Gait normal.   Reflex Scores:       Bicep reflexes are 1+ on the right side and 1+ on the left side.       Brachioradialis reflexes are 1+ on the right side and 1+ on the left side.       Patellar reflexes are 1+ on the right side and 1+ on the left side.  Skin: Skin is warm, dry and intact.   Psychiatric: He has a normal mood and affect. His speech is normal and behavior is normal. Judgment and thought content normal. Cognition and memory are normal.   Nursing note and vitals reviewed.      Assessment/Plan   Tanya was seen today for back pain.    Diagnoses and all orders for this visit:    Chronic pain syndrome    Degeneration of intervertebral disc of lumbar region    Osteoarthritis of cervical spine, unspecified spinal osteoarthritis complication status    Encounter for long-term (current) use of high-risk medication    Other orders  -     HYDROcodone-acetaminophen (NORCO) 7.5-325 MG per tablet; Take 1 tablet by mouth Every 6 (Six) Hours As Needed for Severe Pain .      --- The urine drug screen confirmation from 5-7-18 has been reviewed and the result is APPROPRIATE based on patient history and SULEIMAN report  --- Refill Hydrocodone. Patient appears stable with current regimen. No adverse effects. Regarding continuation of opioids, there is no evidence of aberrant behavior or any red flags.  The patient continues with appropriate response to opioid therapy. ADL's remain intact by self.   --- Declines interventions.  --- Follow-up 2 months or sooner if needed.     SULEIMAN REPORT    As part of the patient's treatment plan, I am prescribing controlled substances. The patient has been made aware of appropriate use of such medications, including potential risk of somnolence, limited ability  to drive and/or work safely, and the potential for dependence or overdose. It has also bee made clear that these medications are for use by this patient only, without concomitant use of alcohol or other substances unless prescribed.     Patient has completed prescribing agreement detailing terms of continued prescribing of controlled substances, including monitoring SULEIMAN reports, urine drug screening, and pill counts if necessary. The patient is aware that inappropriate use will results in cessation of prescribing such medications.    SULEIMAN report has been reviewed and scanned into the patient's chart.    As the clinician, I personally reviewed the SULEIMAN from 7-2-18 while the patient was in the office today.    History and physical exam exhibit continued safe and appropriate use of controlled substances.      EMR Dragon/Transcription disclaimer:   Much of this encounter note is an electronic transcription/translation of spoken language to printed text. The electronic translation of spoken language may permit erroneous, or at times, nonsensical words or phrases to be inadvertently transcribed; Although I have reviewed the note for such errors, some may still exist.

## 2018-07-31 RX ORDER — HYDROCODONE BITARTRATE AND ACETAMINOPHEN 7.5; 325 MG/1; MG/1
1 TABLET ORAL EVERY 6 HOURS PRN
Qty: 120 TABLET | Refills: 0 | Status: SHIPPED | OUTPATIENT
Start: 2018-07-31 | End: 2018-09-04 | Stop reason: SDUPTHER

## 2018-07-31 NOTE — TELEPHONE ENCOUNTER
Medication Refill Request    Date of phone call: 7/30/18    Medication being requested: Hydrocodone-apap 7.5 sig: q6hrs  Qty: 120    Date of last visit: 7/3/18    Date of last refill: 7/3/18    SULEIMAN up to date?: 7/2/18    Next Follow up?: 9/4/18    Any new pertinent information? (i.e, new medication allergies, new use of medications, change in patient's health or condition, non-compliance or inconsistency with prescribing agreement?): n/a

## 2018-09-04 ENCOUNTER — OFFICE VISIT (OUTPATIENT)
Dept: PAIN MEDICINE | Facility: CLINIC | Age: 78
End: 2018-09-04

## 2018-09-04 VITALS
SYSTOLIC BLOOD PRESSURE: 110 MMHG | HEIGHT: 69 IN | OXYGEN SATURATION: 96 % | DIASTOLIC BLOOD PRESSURE: 67 MMHG | WEIGHT: 133 LBS | BODY MASS INDEX: 19.7 KG/M2 | RESPIRATION RATE: 16 BRPM | HEART RATE: 94 BPM | TEMPERATURE: 98.4 F

## 2018-09-04 DIAGNOSIS — M47.812 OSTEOARTHRITIS OF CERVICAL SPINE, UNSPECIFIED SPINAL OSTEOARTHRITIS COMPLICATION STATUS: ICD-10-CM

## 2018-09-04 DIAGNOSIS — Z79.899 ENCOUNTER FOR LONG-TERM (CURRENT) USE OF HIGH-RISK MEDICATION: ICD-10-CM

## 2018-09-04 DIAGNOSIS — M51.36 DEGENERATION OF INTERVERTEBRAL DISC OF LUMBAR REGION: ICD-10-CM

## 2018-09-04 DIAGNOSIS — G89.4 CHRONIC PAIN SYNDROME: Primary | ICD-10-CM

## 2018-09-04 PROCEDURE — 99214 OFFICE O/P EST MOD 30 MIN: CPT | Performed by: NURSE PRACTITIONER

## 2018-09-04 RX ORDER — HYDROCODONE BITARTRATE AND ACETAMINOPHEN 7.5; 325 MG/1; MG/1
1 TABLET ORAL EVERY 6 HOURS PRN
Qty: 120 TABLET | Refills: 0 | Status: SHIPPED | OUTPATIENT
Start: 2018-09-04 | End: 2018-09-28 | Stop reason: SDUPTHER

## 2018-09-04 RX ORDER — GABAPENTIN 800 MG/1
800 TABLET ORAL 3 TIMES DAILY
Qty: 90 TABLET | Refills: 1 | Status: SHIPPED | OUTPATIENT
Start: 2018-09-04 | End: 2018-10-22 | Stop reason: SDUPTHER

## 2018-09-04 NOTE — PROGRESS NOTES
"CHIEF COMPLAINT  Pt states LBP is basically unchanged,with medicine regimen providing short term moderate relief.    Subjective   Tanya Jimenes is a 78 y.o. male  who presents to the office for follow-up.He has a history of neck and back pain. Reports his pain is unchanged since last office visit.    Complains of pain in his low back and neck. Today his pain is 6/10VAS. Describes the pain as continuous and unchanged. Continues with Hydrocodone 7.5/325 4/day and gabapentin 800 mg 3/day(patient is being weaned from this to 2/day) (also weaning Klonopin--patient is upset about this). Denies any side effects from the regimen. The regimen helps decrease his pain moderately \"it brings it way down.\"   Notes improved function and activity. ADL's by self.     Cannot take NSAIDs due to CKD.   Presents with wife.    Neck Pain    This is a chronic problem. The current episode started more than 1 year ago. The problem occurs constantly. Progression since onset: unchanged from last office visit. The pain is at a severity of 6/10 (ranges frm 5-7/10VAS). The pain is moderate. The symptoms are aggravated by position and bending. Pertinent negatives include no chest pain, fever, headaches, numbness, visual change or weakness. He has tried neck support and oral narcotics (gabapentin, Hydrocodone) for the symptoms. The treatment provided moderate relief.   Back Pain   This is a chronic problem. The current episode started more than 1 year ago. The problem occurs constantly. Progression since onset: unchanged from last office visit. The pain is at a severity of 6/10 (pain ranges from 5-7/10VAS). The pain is moderate. Pertinent negatives include no bladder incontinence, bowel incontinence, chest pain, dysuria, fever, headaches, numbness or weakness. He has tried analgesics (gabapentin, Hydrocodone) for the symptoms. The treatment provided moderate relief.      PEG Assessment   What number best describes your pain on average in the past " "week?5  What number best describes how, during the past week, pain has interfered with your enjoyment of life?5  What number best describes how, during the past week, pain has interfered with your general activity?  6    The following portions of the patient's history were reviewed and updated as appropriate: allergies, current medications, past family history, past medical history, past social history, past surgical history and problem list.    Review of Systems   Constitutional: Negative for activity change, chills and fever.   Respiratory: Negative for shortness of breath.    Cardiovascular: Negative for chest pain.   Gastrointestinal: Negative for bowel incontinence, constipation (\"some incontinence\"), diarrhea, nausea and vomiting.   Genitourinary: Negative for bladder incontinence, difficulty urinating, dysuria and enuresis.   Musculoskeletal: Positive for back pain and neck pain.   Neurological: Negative for dizziness, weakness, light-headedness, numbness and headaches.   Psychiatric/Behavioral: Positive for sleep disturbance. Negative for confusion, hallucinations, self-injury and suicidal ideas. The patient is not nervous/anxious.        Vitals:    09/04/18 1257   BP: 110/67   Pulse: 94   Resp: 16   Temp: 98.4 °F (36.9 °C)   SpO2: 96%   Weight: 60.3 kg (133 lb)   Height: 175.3 cm (69.02\")   PainSc: 6  Comment: LBP ranges from 5-7/10   PainLoc: Back     Objective   Physical Exam   Constitutional: He is oriented to person, place, and time. Vital signs are normal. He appears well-developed and well-nourished. He is cooperative.   HENT:   Head: Normocephalic and atraumatic.   Nose: Nose normal.   Eyes: Conjunctivae and lids are normal.   Neck: Trachea normal. Neck supple. Muscular tenderness present. Decreased range of motion present.   Cardiovascular: Normal rate.    Pulmonary/Chest: Effort normal.   Abdominal: Normal appearance.   Musculoskeletal:        Cervical back: He exhibits tenderness.        Lumbar " back: He exhibits tenderness.        Neurological: He is alert and oriented to person, place, and time. Gait normal.   Reflex Scores:       Bicep reflexes are 1+ on the right side and 1+ on the left side.       Brachioradialis reflexes are 1+ on the right side and 1+ on the left side.       Patellar reflexes are 1+ on the right side and 1+ on the left side.  Skin: Skin is warm, dry and intact.   Psychiatric: He has a normal mood and affect. His speech is normal and behavior is normal. Judgment and thought content normal. Cognition and memory are normal.   Nursing note and vitals reviewed.    Assessment/Plan   Tanya was seen today for back pain.    Diagnoses and all orders for this visit:    Chronic pain syndrome    Degeneration of intervertebral disc of lumbar region    Osteoarthritis of cervical spine, unspecified spinal osteoarthritis complication status    Encounter for long-term (current) use of high-risk medication      --- The urine drug screen confirmation from 5-7-18 has been reviewed and the result is APPROPRIATE based on patient history and SULEIMAN report  --- Refill Hydrocodone.Patient appears stable with current regimen. No adverse effects. Regarding continuation of opioids, there is no evidence of aberrant behavior or any red flags.  The patient continues with appropriate response to opioid therapy. ADL's remain intact by self.   --- Assume prescription for Gabapentin 800 mg 3/day. Discussed medication with the patient.  Included in this discussion was the potential for side effects and adverse events.  Patient verbalized understanding and wished to proceed.  Prescription will be sent to pharmacy.  --- Follow-up 2 months or sooner if needed.       SULEIMAN REPORT    As part of the patient's treatment plan, I am prescribing controlled substances. The patient has been made aware of appropriate use of such medications, including potential risk of somnolence, limited ability to drive and/or work safely, and  the potential for dependence or overdose. It has also bee made clear that these medications are for use by this patient only, without concomitant use of alcohol or other substances unless prescribed.     Patient has completed prescribing agreement detailing terms of continued prescribing of controlled substances, including monitoring SULEIMAN reports, urine drug screening, and pill counts if necessary. The patient is aware that inappropriate use will results in cessation of prescribing such medications.    SULEIMAN report has been reviewed and scanned into the patient's chart.    As the clinician, I personally reviewed the SULEIMAN from 8-31-18 while the patient was in the office today.    History and physical exam exhibit continued safe and appropriate use of controlled substances.      EMR Dragon/Transcription disclaimer:   Much of this encounter note is an electronic transcription/translation of spoken language to printed text. The electronic translation of spoken language may permit erroneous, or at times, nonsensical words or phrases to be inadvertently transcribed; Although I have reviewed the note for such errors, some may still exist.

## 2018-10-02 RX ORDER — HYDROCODONE BITARTRATE AND ACETAMINOPHEN 7.5; 325 MG/1; MG/1
1 TABLET ORAL EVERY 6 HOURS PRN
Qty: 120 TABLET | Refills: 0 | Status: SHIPPED | OUTPATIENT
Start: 2018-10-02 | End: 2018-11-01 | Stop reason: SDUPTHER

## 2018-10-19 NOTE — TELEPHONE ENCOUNTER
Medication Refill Request    Date of phone call: 10-19-18    Medication being requested: Hydrocodone-apap 7.5-325 mg sig: Take 1 tablet by mouth every 6 hours PRN   Qty: 120 tablets    Date of last visit: 9-04-18    Date of last refill: 10-4-18    SULEIMAN up to date?: 8-31-18    Next Follow up?: 11-01-18    Any new pertinent information? (i.e, new medication allergies, new use of medications, change in patient's health or condition, non-compliance or inconsistency with prescribing agreement?): Pt had recent fill on 10-04

## 2018-10-22 ENCOUNTER — CLINICAL SUPPORT (OUTPATIENT)
Dept: PAIN MEDICINE | Facility: CLINIC | Age: 78
End: 2018-10-22

## 2018-10-22 DIAGNOSIS — G89.4 CHRONIC PAIN SYNDROME: Primary | ICD-10-CM

## 2018-10-22 LAB
POC AMPHETAMINES: NEGATIVE
POC BARBITURATES: NEGATIVE
POC BENZODIAZEPHINES: POSITIVE
POC COCAINE: NEGATIVE
POC METHADONE: NEGATIVE
POC METHAMPHETAMINE SCREEN URINE: NEGATIVE
POC OPIATES: POSITIVE
POC OXYCODONE: POSITIVE
POC PHENCYCLIDINE: NEGATIVE
POC PROPOXYPHENE: NEGATIVE
POC THC: NEGATIVE
POC TRICYCLIC ANTIDEPRESSANTS: POSITIVE

## 2018-10-22 PROCEDURE — 80305 DRUG TEST PRSMV DIR OPT OBS: CPT | Performed by: NURSE PRACTITIONER

## 2018-10-22 RX ORDER — GABAPENTIN 800 MG/1
800 TABLET ORAL 3 TIMES DAILY
Qty: 90 TABLET | Refills: 2 | Status: SHIPPED | OUTPATIENT
Start: 2018-10-22 | End: 2019-02-28 | Stop reason: SDUPTHER

## 2018-10-22 RX ORDER — HYDROCODONE BITARTRATE AND ACETAMINOPHEN 7.5; 325 MG/1; MG/1
1 TABLET ORAL EVERY 6 HOURS PRN
Qty: 120 TABLET | Refills: 0 | OUTPATIENT
Start: 2018-10-22

## 2018-10-22 NOTE — TELEPHONE ENCOUNTER
Pill count coming in. According to my records, he should not be filled until 11-4-18 and this request is almost 2 weeks too soon. Thanks. bb

## 2018-10-22 NOTE — PROGRESS NOTES
Pt here for random UDS and pill count:    Hydrocodone-acetamin 7.5-325   Laymon Jalil   Take 1 tablet by mouth every 6 hours as needed for severe pain  [facility]  [facility] [place], [place]  QTY: 120  Refill: 0 refills  Date filled: 10/03/18  Pill count: 47    ----------    Gabapentin  800 mg tablet  Laymon Jalil  Take 1 tablet by mouth three times a day  [facility]  [address] [place] [address]  QTYL 90  Refill: 1 by 3/3/19  Date filled: 9/23/18  Pill count: 16

## 2018-10-27 ENCOUNTER — RESULTS ENCOUNTER (OUTPATIENT)
Dept: PAIN MEDICINE | Facility: CLINIC | Age: 78
End: 2018-10-27

## 2018-10-27 DIAGNOSIS — G89.4 CHRONIC PAIN SYNDROME: ICD-10-CM

## 2018-11-01 ENCOUNTER — OFFICE VISIT (OUTPATIENT)
Dept: PAIN MEDICINE | Facility: CLINIC | Age: 78
End: 2018-11-01

## 2018-11-01 VITALS
OXYGEN SATURATION: 91 % | DIASTOLIC BLOOD PRESSURE: 65 MMHG | BODY MASS INDEX: 19.26 KG/M2 | RESPIRATION RATE: 18 BRPM | HEIGHT: 69 IN | SYSTOLIC BLOOD PRESSURE: 99 MMHG | HEART RATE: 103 BPM | WEIGHT: 130 LBS | TEMPERATURE: 98.7 F

## 2018-11-01 DIAGNOSIS — G89.4 CHRONIC PAIN SYNDROME: Primary | ICD-10-CM

## 2018-11-01 DIAGNOSIS — M47.812 OSTEOARTHRITIS OF CERVICAL SPINE, UNSPECIFIED SPINAL OSTEOARTHRITIS COMPLICATION STATUS: ICD-10-CM

## 2018-11-01 DIAGNOSIS — Z79.899 ENCOUNTER FOR LONG-TERM (CURRENT) USE OF HIGH-RISK MEDICATION: ICD-10-CM

## 2018-11-01 DIAGNOSIS — M51.36 DEGENERATION OF INTERVERTEBRAL DISC OF LUMBAR REGION: ICD-10-CM

## 2018-11-01 PROCEDURE — 99214 OFFICE O/P EST MOD 30 MIN: CPT | Performed by: NURSE PRACTITIONER

## 2018-11-01 RX ORDER — HYDROCODONE BITARTRATE AND ACETAMINOPHEN 7.5; 325 MG/1; MG/1
1 TABLET ORAL EVERY 6 HOURS PRN
Qty: 120 TABLET | Refills: 0 | Status: SHIPPED | OUTPATIENT
Start: 2018-11-01 | End: 2018-11-26 | Stop reason: SDUPTHER

## 2018-11-01 RX ORDER — ALBUTEROL SULFATE 90 UG/1
2 AEROSOL, METERED RESPIRATORY (INHALATION) EVERY 4 HOURS PRN
COMMUNITY

## 2018-11-01 NOTE — PROGRESS NOTES
CHIEF COMPLAINT  Back pain is unchanged since last visit.    Subjective   Tanya Jimenes is a 78 y.o. male  who presents to the office for follow-up.He has a history of chronic neck and back pain. Reports his pain pattern is unchanged since last office visit.    Complains of pain in his neck and back. Today his pain is 5/10VAS. Describes the pain as continuous and unchanged. He does report that he is having some coughing and took an antibiotic (finished last month). Is now having trouble at night and is having SOA and coughing, but mainly only at night. Is a non-smoker.   Continues with Hydrocodone 7.5/325 4/day and gabapentin 800 mg 3/day(patient is being weaned from this to 2/day) (also weaning Klonopin--patient is upset about this). Denies any side effects from the regimen. The regimen helps decrease his pain moderately.   Notes improved function and activity. ADL's by self.     PCP is Dr. Nuñez.    Cannot take NSAIDs due to CKD.   Presents with wife.  Neck Pain    This is a chronic problem. The current episode started more than 1 year ago. The problem occurs constantly. Progression since onset: unchanged from last office visit. The pain is at a severity of 5/10. The pain is moderate. The symptoms are aggravated by position and bending. Pertinent negatives include no chest pain, fever, headaches, numbness, visual change or weakness. He has tried neck support and oral narcotics (gabapentin, Hydrocodone) for the symptoms. The treatment provided moderate relief.   Back Pain   This is a chronic problem. The current episode started more than 1 year ago. The problem occurs constantly. Progression since onset: unchanged from last office visit. The pain is at a severity of 5/10. The pain is moderate. Pertinent negatives include no bladder incontinence, bowel incontinence, chest pain, dysuria, fever, headaches, numbness or weakness. He has tried analgesics (gabapentin, Hydrocodone) for the symptoms. The treatment provided  "moderate relief.      PEG Assessment   What number best describes your pain on average in the past week?5  What number best describes how, during the past week, pain has interfered with your enjoyment of life?7  What number best describes how, during the past week, pain has interfered with your general activity?  7    The following portions of the patient's history were reviewed and updated as appropriate: allergies, current medications, past family history, past medical history, past social history, past surgical history and problem list.    Review of Systems   Constitutional: Negative for chills and fever.   Respiratory: Positive for shortness of breath.    Cardiovascular: Negative for chest pain.   Gastrointestinal: Negative for bowel incontinence, constipation, diarrhea, nausea and vomiting.   Genitourinary: Negative for bladder incontinence, difficulty urinating, dysuria and flank pain.   Musculoskeletal: Positive for back pain and neck pain.   Neurological: Positive for dizziness. Negative for weakness, light-headedness, numbness and headaches.   Psychiatric/Behavioral: Negative for confusion, hallucinations, self-injury, sleep disturbance and suicidal ideas. The patient is not nervous/anxious.        Vitals:    11/01/18 1359   BP: 99/65   Pulse: 103   Resp: 18   Temp: 98.7 °F (37.1 °C)   SpO2: 91%   Weight: 59 kg (130 lb)   Height: 175.3 cm (69.02\")   PainSc:   5   PainLoc: Back     Objective   Physical Exam   Constitutional: He is oriented to person, place, and time. Vital signs are normal. He appears well-developed and well-nourished. He is cooperative.   HENT:   Head: Normocephalic and atraumatic.   Nose: Nose normal.   Eyes: Conjunctivae and lids are normal.   Neck: Trachea normal. Neck supple. Muscular tenderness present. Decreased range of motion present.   Cardiovascular: Normal rate.    Pulmonary/Chest: Effort normal.   Abdominal: Normal appearance.   Musculoskeletal:        Cervical back: He exhibits " tenderness.        Lumbar back: He exhibits tenderness.        Neurological: He is alert and oriented to person, place, and time. Gait normal.   Reflex Scores:       Bicep reflexes are 1+ on the right side and 1+ on the left side.       Brachioradialis reflexes are 1+ on the right side and 1+ on the left side.       Patellar reflexes are 1+ on the right side and 1+ on the left side.  Skin: Skin is warm, dry and intact.   Psychiatric: He has a normal mood and affect. His speech is normal and behavior is normal. Judgment and thought content normal. Cognition and memory are normal.   Nursing note and vitals reviewed.      Assessment/Plan   Tanya was seen today for back pain.    Diagnoses and all orders for this visit:    Chronic pain syndrome    Degeneration of intervertebral disc of lumbar region    Osteoarthritis of cervical spine, unspecified spinal osteoarthritis complication status    Encounter for long-term (current) use of high-risk medication    Other orders  -     HYDROcodone-acetaminophen (NORCO) 7.5-325 MG per tablet; Take 1 tablet by mouth Every 6 (Six) Hours As Needed for Severe Pain .      --- The urine drug screen confirmation from 10-22-18 has been reviewed and the result is APPROPRIATE based on patient history and SULEIMAN report  --- PIll count 10-22-18-- APPROPRIATE  --- Refill Hydrocodone. Patient appears stable with current regimen. No adverse effects. Regarding continuation of opioids, there is no evidence of aberrant behavior or any red flags.  The patient continues with appropriate response to opioid therapy. ADL's remain intact by self.   --- Encouraged patient to follow-up with PCP for paroxysmal noctural dyspnea.   --- Follow-up 2 months or sooner if needed.       SULEIMAN REPORT    As part of the patient's treatment plan, I am prescribing controlled substances. The patient has been made aware of appropriate use of such medications, including potential risk of somnolence, limited ability to  drive and/or work safely, and the potential for dependence or overdose. It has also bee made clear that these medications are for use by this patient only, without concomitant use of alcohol or other substances unless prescribed.     Patient has completed prescribing agreement detailing terms of continued prescribing of controlled substances, including monitoring SULEIMAN reports, urine drug screening, and pill counts if necessary. The patient is aware that inappropriate use will results in cessation of prescribing such medications.    SULEIMAN report has been reviewed and scanned into the patient's chart.    As the clinician, I personally reviewed the SULEIMAN from 10-31-18 while the patient was in the office today.    History and physical exam exhibit continued safe and appropriate use of controlled substances.      EMR Dragon/Transcription disclaimer:   Much of this encounter note is an electronic transcription/translation of spoken language to printed text. The electronic translation of spoken language may permit erroneous, or at times, nonsensical words or phrases to be inadvertently transcribed; Although I have reviewed the note for such errors, some may still exist.

## 2018-11-26 RX ORDER — HYDROCODONE BITARTRATE AND ACETAMINOPHEN 7.5; 325 MG/1; MG/1
1 TABLET ORAL EVERY 6 HOURS PRN
Qty: 120 TABLET | Refills: 0 | Status: SHIPPED | OUTPATIENT
Start: 2018-11-26 | End: 2018-12-28 | Stop reason: SDUPTHER

## 2018-11-26 NOTE — TELEPHONE ENCOUNTER
Medication Refill Request    Date of phone call: 11/26/18    Medication being requested: Hydrocodone-apap 7.5 sig: q6hrs  Qty: 120    Date of last visit: 11/1/18    Date of last refill: 11/1/18    SULEIMAN up to date?: 10/31/18    Next Follow up?: 12/28/18    Any new pertinent information? (i.e, new medication allergies, new use of medications, change in patient's health or condition, non-compliance or inconsistency with prescribing agreement?): n/a - [facility] pharmacy

## 2018-12-28 ENCOUNTER — OFFICE VISIT (OUTPATIENT)
Dept: PAIN MEDICINE | Facility: CLINIC | Age: 78
End: 2018-12-28

## 2018-12-28 VITALS
OXYGEN SATURATION: 97 % | SYSTOLIC BLOOD PRESSURE: 107 MMHG | BODY MASS INDEX: 18.51 KG/M2 | WEIGHT: 125 LBS | RESPIRATION RATE: 18 BRPM | HEART RATE: 98 BPM | HEIGHT: 69 IN | DIASTOLIC BLOOD PRESSURE: 66 MMHG | TEMPERATURE: 98.5 F

## 2018-12-28 DIAGNOSIS — M47.812 OSTEOARTHRITIS OF CERVICAL SPINE, UNSPECIFIED SPINAL OSTEOARTHRITIS COMPLICATION STATUS: ICD-10-CM

## 2018-12-28 DIAGNOSIS — M51.36 DEGENERATION OF INTERVERTEBRAL DISC OF LUMBAR REGION: ICD-10-CM

## 2018-12-28 DIAGNOSIS — G89.4 CHRONIC PAIN SYNDROME: Primary | ICD-10-CM

## 2018-12-28 DIAGNOSIS — Z79.899 ENCOUNTER FOR LONG-TERM (CURRENT) USE OF HIGH-RISK MEDICATION: ICD-10-CM

## 2018-12-28 PROCEDURE — 99214 OFFICE O/P EST MOD 30 MIN: CPT | Performed by: NURSE PRACTITIONER

## 2018-12-28 RX ORDER — HYDROCODONE BITARTRATE AND ACETAMINOPHEN 7.5; 325 MG/1; MG/1
1 TABLET ORAL EVERY 6 HOURS PRN
Qty: 120 TABLET | Refills: 0 | Status: SHIPPED | OUTPATIENT
Start: 2018-12-28 | End: 2019-01-25 | Stop reason: SDUPTHER

## 2018-12-28 NOTE — PROGRESS NOTES
"CHIEF COMPLAINT  Back pain is unchanged since last visit.    Subjective   Tanya Jimnees is a 78 y.o. male  who presents to the office for follow-up.He has a history of chronic back and neck pain. Reports this is unchanged since last office visit.    Complains of pain in his neck and back. Today his pain is 5/10VAS. Describes the pain as nearly continuous aching. Continues with Hydrocodone 7.5/325 4/day and gabapentin 800 mg 3/day. Denies any side effects from the regimen. He is noticing that his short term memory is not as good as it once was. He does not believe it is due to gabapentin or Hydrocodone. The regimen helps decrease his pain by \"at least half.\" \"It makes me feel better.\" \"I just don't think I could function without them.\"  Notes improved function and activity. ADL's by self.      PCP is Dr. Nuñez.     Cannot take NSAIDs due to CKD.     \"I feel alright except for my lungs now.\"  Now has a nebulizer. He is unsure what is wrong, he states it's no COPD or asthma. Sees PCP in approximately 1-7-19 and plans to ask more question.      Neck Pain    This is a chronic problem. The current episode started more than 1 year ago. The problem occurs constantly. Progression since onset: unchanged from last office visit. The pain is at a severity of 5/10. The pain is moderate. The symptoms are aggravated by position and bending. Pertinent negatives include no chest pain, fever, headaches, numbness, visual change or weakness. He has tried neck support and oral narcotics (gabapentin, Hydrocodone) for the symptoms. The treatment provided moderate relief.   Back Pain   This is a chronic problem. The current episode started more than 1 year ago. The problem occurs constantly. Progression since onset: unchanged from last office visit. The pain is at a severity of 5/10. The pain is moderate. Pertinent negatives include no bladder incontinence, bowel incontinence, chest pain, dysuria, fever, headaches, numbness or weakness. He " "has tried analgesics (gabapentin, Hydrocodone) for the symptoms. The treatment provided moderate relief.      The following portions of the patient's history were reviewed and updated as appropriate: allergies, current medications, past family history, past medical history, past social history, past surgical history and problem list.    Review of Systems   Constitutional: Negative for chills and fever.   Respiratory: Negative for shortness of breath.    Cardiovascular: Negative for chest pain.   Gastrointestinal: Negative for bowel incontinence, constipation, diarrhea, nausea and vomiting.   Genitourinary: Negative for bladder incontinence, difficulty urinating, dysuria and enuresis.   Musculoskeletal: Positive for back pain and neck pain.   Neurological: Negative for dizziness, weakness, light-headedness, numbness and headaches.   Psychiatric/Behavioral: Negative for confusion, hallucinations, self-injury, sleep disturbance and suicidal ideas. The patient is not nervous/anxious.        Vitals:    12/28/18 1358   BP: 107/66   Pulse: 98   Resp: 18   Temp: 98.5 °F (36.9 °C)   TempSrc: Oral   SpO2: 97%   Weight: 56.7 kg (125 lb)   Height: 175.3 cm (69.02\")   PainSc:   5   PainLoc: Back     Objective   Physical Exam   Constitutional: He is oriented to person, place, and time. Vital signs are normal. He appears well-developed and well-nourished. He is cooperative.   HENT:   Head: Normocephalic and atraumatic.   Nose: Nose normal.   Eyes: Conjunctivae and lids are normal.   Neck: Trachea normal. Neck supple. Muscular tenderness present. Decreased range of motion present.   Cardiovascular: Normal rate.   Pulmonary/Chest: Effort normal.   Abdominal: Normal appearance.   Musculoskeletal:        Cervical back: He exhibits tenderness.        Lumbar back: He exhibits tenderness.        Neurological: He is alert and oriented to person, place, and time. Gait normal.   Reflex Scores:       Bicep reflexes are 1+ on the right side " and 1+ on the left side.       Brachioradialis reflexes are 1+ on the right side and 1+ on the left side.       Patellar reflexes are 1+ on the right side and 1+ on the left side.  Skin: Skin is warm, dry and intact.   Psychiatric: He has a normal mood and affect. His speech is normal and behavior is normal. Judgment and thought content normal. Cognition and memory are normal.   Nursing note and vitals reviewed.      Assessment/Plan   Tanya was seen today for back pain.    Diagnoses and all orders for this visit:    Chronic pain syndrome    Degeneration of intervertebral disc of lumbar region    Osteoarthritis of cervical spine, unspecified spinal osteoarthritis complication status    Encounter for long-term (current) use of high-risk medication    Other orders  -     HYDROcodone-acetaminophen (NORCO) 7.5-325 MG per tablet; Take 1 tablet by mouth Every 6 (Six) Hours As Needed for Severe Pain .      --- The urine drug screen confirmation from 10-22-18 has been reviewed and the result is APPROPRIATE based on patient history and SULEIMAN report  --- Refill Hydrocodone. Patient appears stable with current regimen. No adverse effects. Regarding continuation of opioids, there is no evidence of aberrant behavior or any red flags.  The patient continues with appropriate response to opioid therapy. ADL's remain intact by self.  Discussed potential weaning to see if his memory improved. Patient declined at this time. Discussed trying to decrease as tolerated.   --- Follow-up 2 months or sooner if needed.     SULEIMAN REPORT    As part of the patient's treatment plan, I am prescribing controlled substances. The patient has been made aware of appropriate use of such medications, including potential risk of somnolence, limited ability to drive and/or work safely, and the potential for dependence or overdose. It has also bee made clear that these medications are for use by this patient only, without concomitant use of alcohol or  other substances unless prescribed.     Patient has completed prescribing agreement detailing terms of continued prescribing of controlled substances, including monitoring SULEIMAN reports, urine drug screening, and pill counts if necessary. The patient is aware that inappropriate use will results in cessation of prescribing such medications.    SULEIMAN report has been reviewed and scanned into the patient's chart.    As the clinician, I personally reviewed the SULEIMAN from 12-27-18 while the patient was in the office today.    History and physical exam exhibit continued safe and appropriate use of controlled substances.      EMR Dragon/Transcription disclaimer:   Much of this encounter note is an electronic transcription/translation of spoken language to printed text. The electronic translation of spoken language may permit erroneous, or at times, nonsensical words or phrases to be inadvertently transcribed; Although I have reviewed the note for such errors, some may still exist.

## 2019-01-28 RX ORDER — HYDROCODONE BITARTRATE AND ACETAMINOPHEN 7.5; 325 MG/1; MG/1
1 TABLET ORAL EVERY 6 HOURS PRN
Qty: 120 TABLET | Refills: 0 | Status: SHIPPED | OUTPATIENT
Start: 2019-01-28 | End: 2019-02-28 | Stop reason: SDUPTHER

## 2019-02-28 ENCOUNTER — OFFICE VISIT (OUTPATIENT)
Dept: PAIN MEDICINE | Facility: CLINIC | Age: 79
End: 2019-02-28

## 2019-02-28 VITALS
OXYGEN SATURATION: 96 % | RESPIRATION RATE: 15 BRPM | SYSTOLIC BLOOD PRESSURE: 109 MMHG | DIASTOLIC BLOOD PRESSURE: 68 MMHG | WEIGHT: 125.8 LBS | TEMPERATURE: 97.6 F | HEIGHT: 69 IN | BODY MASS INDEX: 18.63 KG/M2 | HEART RATE: 90 BPM

## 2019-02-28 DIAGNOSIS — Z79.899 ENCOUNTER FOR LONG-TERM (CURRENT) USE OF HIGH-RISK MEDICATION: ICD-10-CM

## 2019-02-28 DIAGNOSIS — M47.812 OSTEOARTHRITIS OF CERVICAL SPINE, UNSPECIFIED SPINAL OSTEOARTHRITIS COMPLICATION STATUS: ICD-10-CM

## 2019-02-28 DIAGNOSIS — M51.36 DEGENERATION OF INTERVERTEBRAL DISC OF LUMBAR REGION: ICD-10-CM

## 2019-02-28 DIAGNOSIS — G89.4 CHRONIC PAIN SYNDROME: Primary | ICD-10-CM

## 2019-02-28 PROCEDURE — 99214 OFFICE O/P EST MOD 30 MIN: CPT | Performed by: NURSE PRACTITIONER

## 2019-02-28 RX ORDER — HYDROCODONE BITARTRATE AND ACETAMINOPHEN 7.5; 325 MG/1; MG/1
1 TABLET ORAL EVERY 6 HOURS PRN
Qty: 120 TABLET | Refills: 0 | Status: SHIPPED | OUTPATIENT
Start: 2019-02-28 | End: 2019-03-25 | Stop reason: SDUPTHER

## 2019-02-28 RX ORDER — GABAPENTIN 800 MG/1
800 TABLET ORAL 3 TIMES DAILY
Qty: 270 TABLET | Refills: 1 | Status: SHIPPED | OUTPATIENT
Start: 2019-02-28 | End: 2019-05-23 | Stop reason: SDUPTHER

## 2019-02-28 NOTE — PROGRESS NOTES
"CHIEF COMPLAINT  F/U back pain. States back pain is unchanged.    Subjective   Tanya Jimenes is a 78 y.o. male  who presents to the office for follow-up.He has a history of back and neck pain. Reports his pain is unchanged since last office visit.    Complains of pain in his neck and back. Today his pain is 5/10VAs. Describes his pain as nearly continuous aching and throbbing. Also notes pain in other joints as well. Continues with Hydrocodone 7.5/325 4/day and gabapentin 800 mg 3/day. Denies any side effects from the regimen. The regimen helps decrease his pain \"it brings it way down. Without that pain medication I could not function.\"   Notes improved function and activity. ADL's by self.   Cannot take NSAIDs due to CKD.   REports his wife is no longer living with him.  Neck Pain    This is a chronic problem. The current episode started more than 1 year ago. The problem occurs constantly. Progression since onset: unchanged from last office visit. The pain is at a severity of 5/10. The pain is moderate. The symptoms are aggravated by position and bending. Pertinent negatives include no chest pain, fever, headaches, numbness, visual change or weakness. He has tried neck support and oral narcotics (gabapentin, Hydrocodone) for the symptoms. The treatment provided moderate relief.   Back Pain   This is a chronic problem. The current episode started more than 1 year ago. The problem occurs constantly. Progression since onset: unchanged from last office visit. The pain is at a severity of 5/10. The pain is moderate. Pertinent negatives include no bladder incontinence, bowel incontinence, chest pain, dysuria, fever, headaches, numbness or weakness. He has tried analgesics (gabapentin, Hydrocodone) for the symptoms. The treatment provided moderate relief.      PEG Assessment   What number best describes your pain on average in the past week?5  What number best describes how, during the past week, pain has interfered with " "your enjoyment of life?7  What number best describes how, during the past week, pain has interfered with your general activity?  5    The following portions of the patient's history were reviewed and updated as appropriate: allergies, current medications, past family history, past medical history, past social history, past surgical history and problem list.    Review of Systems   Constitutional: Negative for chills and fever.   HENT: Positive for hearing loss.    Respiratory: Negative for shortness of breath.    Cardiovascular: Negative for chest pain.   Gastrointestinal: Negative for bowel incontinence, constipation, diarrhea, nausea and vomiting.   Genitourinary: Negative for bladder incontinence, difficulty urinating, dysuria and enuresis.   Musculoskeletal: Positive for back pain and neck pain.   Neurological: Negative for dizziness, weakness, light-headedness, numbness and headaches.   Psychiatric/Behavioral: Negative for confusion, hallucinations, self-injury, sleep disturbance and suicidal ideas. The patient is not nervous/anxious.        Vitals:    02/28/19 1334   BP: 109/68   Pulse: 90   Resp: 15   Temp: 97.6 °F (36.4 °C)   SpO2: 96%   Weight: 57.1 kg (125 lb 12.8 oz)   Height: 175.3 cm (69.02\")   PainSc:   5   PainLoc: Back     Objective   Physical Exam   Constitutional: He is oriented to person, place, and time. Vital signs are normal. He appears well-developed and well-nourished. He is cooperative.   HENT:   Head: Normocephalic and atraumatic.   Nose: Nose normal.   Eyes: Conjunctivae and lids are normal.   Neck: Trachea normal. Neck supple. Muscular tenderness present. Decreased range of motion present.   Cardiovascular: Normal rate.   Pulmonary/Chest: Effort normal.   Abdominal: Normal appearance.   Musculoskeletal:        Cervical back: He exhibits tenderness.        Lumbar back: He exhibits tenderness.   Neurological: He is alert and oriented to person, place, and time. Gait normal.   Skin: Skin is " warm, dry and intact.   Psychiatric: He has a normal mood and affect. His speech is normal and behavior is normal. Judgment and thought content normal. Cognition and memory are normal.   Nursing note and vitals reviewed.    Assessment/Plan   Tanya was seen today for back pain.    Diagnoses and all orders for this visit:    Chronic pain syndrome    Degeneration of intervertebral disc of lumbar region    Osteoarthritis of cervical spine, unspecified spinal osteoarthritis complication status    Encounter for long-term (current) use of high-risk medication    Other orders  -     gabapentin (NEURONTIN) 800 MG tablet; Take 1 tablet by mouth 3 (Three) Times a Day.  -     HYDROcodone-acetaminophen (NORCO) 7.5-325 MG per tablet; Take 1 tablet by mouth Every 6 (Six) Hours As Needed for Severe Pain .      --- The urine drug screen confirmation from 10-22-19 has been reviewed and the result is APPROPRIATE based on patient history and SULEIMAN report  --- Refill Hydrocodone. Patient appears stable with current regimen. No adverse effects. Regarding continuation of opioids, there is no evidence of aberrant behavior or any red flags.  The patient continues with appropriate response to opioid therapy. ADL's remain intact by self.   --- Refill Gabapentin.  --- Declines interventions.  --- Follow-up 2 months or sooner if needed.     SULEIMAN REPORT    As part of the patient's treatment plan, I am prescribing controlled substances. The patient has been made aware of appropriate use of such medications, including potential risk of somnolence, limited ability to drive and/or work safely, and the potential for dependence or overdose. It has also bee made clear that these medications are for use by this patient only, without concomitant use of alcohol or other substances unless prescribed.     Patient has completed prescribing agreement detailing terms of continued prescribing of controlled substances, including monitoring SULEIMAN reports,  urine drug screening, and pill counts if necessary. The patient is aware that inappropriate use will results in cessation of prescribing such medications.    SULEIMAN report has been reviewed and scanned into the patient's chart.    As the clinician, I personally reviewed the SULEIMAN from 2-27-19 while the patient was in the office today.    History and physical exam exhibit continued safe and appropriate use of controlled substances.      EMR Dragon/Transcription disclaimer:   Much of this encounter note is an electronic transcription/translation of spoken language to printed text. The electronic translation of spoken language may permit erroneous, or at times, nonsensical words or phrases to be inadvertently transcribed; Although I have reviewed the note for such errors, some may still exist.

## 2019-03-25 NOTE — TELEPHONE ENCOUNTER
Medication Refill Request    Date of phone call: 3/25/19    Medication being requested: Norco 7.5-325 mg si tab po q 6hrs PRN  Qty: 120    Date of last visit: 19    Date of last refill: 19    SULEIMAN up to date?: yes    Next Follow up?: 19    Any new pertinent information? (i.e, new medication allergies, new use of medications, change in patient's health or condition, non-compliance or inconsistency with prescribing agreement?):

## 2019-03-26 RX ORDER — HYDROCODONE BITARTRATE AND ACETAMINOPHEN 7.5; 325 MG/1; MG/1
1 TABLET ORAL EVERY 6 HOURS PRN
Qty: 120 TABLET | Refills: 0 | Status: SHIPPED | OUTPATIENT
Start: 2019-03-26 | End: 2019-04-29 | Stop reason: SDUPTHER

## 2019-04-29 ENCOUNTER — OFFICE VISIT (OUTPATIENT)
Dept: PAIN MEDICINE | Facility: CLINIC | Age: 79
End: 2019-04-29

## 2019-04-29 ENCOUNTER — RESULTS ENCOUNTER (OUTPATIENT)
Dept: PAIN MEDICINE | Facility: CLINIC | Age: 79
End: 2019-04-29

## 2019-04-29 VITALS
SYSTOLIC BLOOD PRESSURE: 98 MMHG | HEIGHT: 69 IN | RESPIRATION RATE: 16 BRPM | OXYGEN SATURATION: 97 % | WEIGHT: 123 LBS | HEART RATE: 80 BPM | DIASTOLIC BLOOD PRESSURE: 60 MMHG | BODY MASS INDEX: 18.22 KG/M2 | TEMPERATURE: 96.8 F

## 2019-04-29 DIAGNOSIS — M51.36 DEGENERATION OF INTERVERTEBRAL DISC OF LUMBAR REGION: ICD-10-CM

## 2019-04-29 DIAGNOSIS — Z79.899 ENCOUNTER FOR LONG-TERM (CURRENT) USE OF HIGH-RISK MEDICATION: ICD-10-CM

## 2019-04-29 DIAGNOSIS — G89.4 CHRONIC PAIN SYNDROME: ICD-10-CM

## 2019-04-29 DIAGNOSIS — G89.4 CHRONIC PAIN SYNDROME: Primary | ICD-10-CM

## 2019-04-29 DIAGNOSIS — M47.812 OSTEOARTHRITIS OF CERVICAL SPINE, UNSPECIFIED SPINAL OSTEOARTHRITIS COMPLICATION STATUS: ICD-10-CM

## 2019-04-29 LAB
POC AMPHETAMINES: NEGATIVE
POC BARBITURATES: NEGATIVE
POC BENZODIAZEPHINES: POSITIVE
POC COCAINE: NEGATIVE
POC METHADONE: NEGATIVE
POC METHAMPHETAMINE SCREEN URINE: NEGATIVE
POC OPIATES: POSITIVE
POC OXYCODONE: POSITIVE
POC PHENCYCLIDINE: NEGATIVE
POC PROPOXYPHENE: NEGATIVE
POC THC: POSITIVE
POC TRICYCLIC ANTIDEPRESSANTS: NEGATIVE

## 2019-04-29 PROCEDURE — 80305 DRUG TEST PRSMV DIR OPT OBS: CPT | Performed by: NURSE PRACTITIONER

## 2019-04-29 PROCEDURE — 99214 OFFICE O/P EST MOD 30 MIN: CPT | Performed by: NURSE PRACTITIONER

## 2019-04-29 RX ORDER — HYDROCODONE BITARTRATE AND ACETAMINOPHEN 7.5; 325 MG/1; MG/1
1 TABLET ORAL EVERY 6 HOURS PRN
Qty: 40 TABLET | Refills: 0 | Status: SHIPPED | OUTPATIENT
Start: 2019-04-29 | End: 2019-05-09 | Stop reason: SDUPTHER

## 2019-05-09 ENCOUNTER — TELEPHONE (OUTPATIENT)
Dept: PAIN MEDICINE | Facility: CLINIC | Age: 79
End: 2019-05-09

## 2019-05-09 RX ORDER — HYDROCODONE BITARTRATE AND ACETAMINOPHEN 7.5; 325 MG/1; MG/1
1 TABLET ORAL EVERY 6 HOURS PRN
Qty: 120 TABLET | Refills: 0 | Status: SHIPPED | OUTPATIENT
Start: 2019-05-09 | End: 2019-06-05 | Stop reason: SDUPTHER

## 2019-05-09 NOTE — TELEPHONE ENCOUNTER
Patient called and stated that he is out of medication. He took his last tab this morning. He said that you would send in more medication. I saw his last refill was 4/29. Please advise.

## 2019-05-09 NOTE — TELEPHONE ENCOUNTER
Unfortunately, his UDS confirmation was not performed. I have addressed this with (ellie valles). At this time, it would be inappropriate not to refill the medication. I will refill medication. Please jessi for UDS at next office visit. Thanks. SCAR

## 2019-05-23 RX ORDER — GABAPENTIN 800 MG/1
800 TABLET ORAL 2 TIMES DAILY
Qty: 180 TABLET | Refills: 1 | Status: SHIPPED | OUTPATIENT
Start: 2019-05-23 | End: 2019-08-23 | Stop reason: SDUPTHER

## 2019-05-23 NOTE — TELEPHONE ENCOUNTER
Medication Refill Request    Date of phone call: 19    Medication being requested: gabapentin 800mg si tab po tid  Qty: 270    Date of last visit: 19    Date of last refill: 19    SULEIMAN up to date?: yes    Next Follow up?: 19    Any new pertinent information? (i.e, new medication allergies, new use of medications, change in patient's health or condition, non-compliance or inconsistency with prescribing agreement?): pt called in this morning requesting refill on gabapentin, also stated his pcp put him on lorazepam and pcp advised to decrease neurontin to bid due to lorazepam. Please advise.

## 2019-06-05 RX ORDER — HYDROCODONE BITARTRATE AND ACETAMINOPHEN 7.5; 325 MG/1; MG/1
1 TABLET ORAL EVERY 6 HOURS PRN
Qty: 120 TABLET | Refills: 0 | Status: SHIPPED | OUTPATIENT
Start: 2019-06-05 | End: 2019-06-27 | Stop reason: DRUGHIGH

## 2019-06-05 NOTE — TELEPHONE ENCOUNTER
Medication Refill Request    Date of phone call: 6/5/2019    Medication being requested:Norco 7.5-325 mg  sig: Take 1 tablet by mouth every 6 hours as needed for severe pain  Qty: 120    Date of last visit:4/29/2019    Date of last refill:5/9/2019    SULEIMAN up to date: yes    Next Follow up?:6/27/2019    Any new pertinent information? (i.e, new medication allergies, new use of medications, change in patient's health or condition, non-compliance or inconsistency with prescribing agreement?):

## 2019-06-05 NOTE — TELEPHONE ENCOUNTER
Reviewed UDS and SULEIMAN. Both updated and appropriate. Refill appropriate.      PLEASE MARTHA FOR UDS AT NEXT OFFICE VISIT.

## 2019-06-27 ENCOUNTER — OFFICE VISIT (OUTPATIENT)
Dept: PAIN MEDICINE | Facility: CLINIC | Age: 79
End: 2019-06-27

## 2019-06-27 VITALS
BODY MASS INDEX: 18.66 KG/M2 | OXYGEN SATURATION: 95 % | HEART RATE: 88 BPM | RESPIRATION RATE: 16 BRPM | DIASTOLIC BLOOD PRESSURE: 64 MMHG | HEIGHT: 69 IN | WEIGHT: 126 LBS | TEMPERATURE: 97.7 F | SYSTOLIC BLOOD PRESSURE: 104 MMHG

## 2019-06-27 DIAGNOSIS — M54.12 CERVICAL RADICULOPATHY: ICD-10-CM

## 2019-06-27 DIAGNOSIS — M47.812 OSTEOARTHRITIS OF CERVICAL SPINE, UNSPECIFIED SPINAL OSTEOARTHRITIS COMPLICATION STATUS: ICD-10-CM

## 2019-06-27 DIAGNOSIS — M54.16 LUMBAR RADICULOPATHY: ICD-10-CM

## 2019-06-27 DIAGNOSIS — Z79.899 ENCOUNTER FOR LONG-TERM (CURRENT) USE OF HIGH-RISK MEDICATION: ICD-10-CM

## 2019-06-27 DIAGNOSIS — G89.4 CHRONIC PAIN SYNDROME: Primary | ICD-10-CM

## 2019-06-27 DIAGNOSIS — M51.36 DEGENERATION OF INTERVERTEBRAL DISC OF LUMBAR REGION: ICD-10-CM

## 2019-06-27 PROCEDURE — 80305 DRUG TEST PRSMV DIR OPT OBS: CPT | Performed by: NURSE PRACTITIONER

## 2019-06-27 PROCEDURE — 99214 OFFICE O/P EST MOD 30 MIN: CPT | Performed by: NURSE PRACTITIONER

## 2019-06-27 RX ORDER — HYDROCODONE BITARTRATE AND ACETAMINOPHEN 10; 325 MG/1; MG/1
1 TABLET ORAL EVERY 6 HOURS PRN
Qty: 120 TABLET | Refills: 0 | Status: SHIPPED | OUTPATIENT
Start: 2019-06-27 | End: 2019-08-05 | Stop reason: SDUPTHER

## 2019-06-27 NOTE — PROGRESS NOTES
CHIEF COMPLAINT  Pt is here to f/u on neck and back pain. Pt sts the pain is severe at times.    Subjective   Tanya Jimenes is a 79 y.o. male  who presents to the office for follow-up.He has a history of chronic back and neck pain. Reports his pain is unchanged since last office visit.    Complains of pain in his low back and neck. Today his pain is 7/10VAS. Describes his pain as continuous aching and throbbing. Pain increases with walking, standing, activity, use of arm; pain decreases with medication and rest. Continues with Hydrocodone 7.5/325 4/day and gabapentin 800 mg 3/day. Denies any side effects from the regimen. The regimen helps decrease his pain by 20%. ADL's by self.  Does not take Klonopin at same time as Hydrocodone.      Cannot take NSAIDs due to CKD.    Had a recent GI and respiratory issue. Tried Anoro. Doing better. Wife was smoking in bedroom and this really irritated his legs.      Neck Pain    This is a chronic problem. The current episode started more than 1 year ago. The problem occurs constantly. Progression since onset: unchanged from last office visit. The pain is at a severity of 7/10. The pain is moderate. The symptoms are aggravated by position and bending. Pertinent negatives include no chest pain, fever, headaches, numbness, visual change or weakness. He has tried neck support and oral narcotics (gabapentin, Hydrocodone) for the symptoms. The treatment provided moderate relief.   Back Pain   This is a chronic problem. The current episode started more than 1 year ago. The problem occurs constantly. Progression since onset: unchanged from last office visit. The pain is at a severity of 7/10. The pain is moderate. Pertinent negatives include no abdominal pain, bladder incontinence, bowel incontinence, chest pain, dysuria, fever, headaches, numbness or weakness. He has tried analgesics (gabapentin, Hydrocodone) for the symptoms. The treatment provided moderate relief.      PEG Assessment  "  What number best describes your pain on average in the past week?8  What number best describes how, during the past week, pain has interfered with your enjoyment of life?8  What number best describes how, during the past week, pain has interfered with your general activity?  8    The following portions of the patient's history were reviewed and updated as appropriate: allergies, current medications, past family history, past medical history, past social history, past surgical history and problem list.    Review of Systems   Constitutional: Negative for fatigue and fever.   HENT: Negative for dental problem.    Respiratory: Negative for shortness of breath.    Cardiovascular: Negative for chest pain.   Gastrointestinal: Negative for abdominal pain and bowel incontinence.   Genitourinary: Negative for bladder incontinence, difficulty urinating and dysuria.   Musculoskeletal: Positive for back pain. Negative for neck pain.   Neurological: Negative for dizziness, weakness, numbness and headaches.   Psychiatric/Behavioral: Negative for sleep disturbance.       Vitals:    06/27/19 1331   BP: 104/64   Pulse: 88   Resp: 16   Temp: 97.7 °F (36.5 °C)   SpO2: 95%   Weight: 57.2 kg (126 lb)   Height: 175.3 cm (69.02\")   PainSc:   7   PainLoc: Back     Objective   Physical Exam   Constitutional: He is oriented to person, place, and time. Vital signs are normal. He appears well-developed and well-nourished. He is cooperative.   Hard of hearing   HENT:   Head: Normocephalic and atraumatic.   Nose: Nose normal.   Eyes: Conjunctivae and lids are normal.   Neck: Trachea normal. Neck supple. Muscular tenderness present. Decreased range of motion present.   Cardiovascular: Normal rate.   Pulmonary/Chest: Effort normal.   Abdominal: Normal appearance.   Musculoskeletal:        Cervical back: He exhibits tenderness.        Lumbar back: He exhibits tenderness.   Neurological: He is alert and oriented to person, place, and time. Gait " normal.   Skin: Skin is warm, dry and intact.   Psychiatric: His speech is normal and behavior is normal. Thought content normal. His affect is blunt.   Nursing note and vitals reviewed.      Assessment/Plan   Tanya was seen today for back pain and neck pain.    Diagnoses and all orders for this visit:    Chronic pain syndrome    Osteoarthritis of cervical spine, unspecified spinal osteoarthritis complication status    Degeneration of intervertebral disc of lumbar region    Lumbar radiculopathy    Cervical radiculopathy    Encounter for long-term (current) use of high-risk medication    Other orders  -     HYDROcodone-acetaminophen (NORCO)  MG per tablet; Take 1 tablet by mouth Every 6 (Six) Hours As Needed for Moderate Pain .      --- Routine UDS in office today as part of monitoring requirements for controlled substances.  The specimen was viewed and the immunoassay result reviewed and is +OPI, +OXY, +TCA(anticipate false positive OXY).  This specimen will be sent to [facility] laboratory for confirmation.     --- The urine drug screen confirmation from 4-30-19 has been reviewed and the result was unavailable due to specimen not being able to be run through confirmation.  --- Refill Hydrocodone but will increase to 10/325. Reviewed if this was nor therapeutic, next plan is to move to long-acting opioid only. Patient verbalized understanding. Patient appears stable with current regimen. No adverse effects. Regarding continuation of opioids, there is no evidence of aberrant behavior or any red flags.  The patient continues with appropriate response to opioid therapy. ADL's remain intact by self.   --- Follow-up 1 month--- reviewed increased and concurrent BZD use will change to monthly follow-ups. Patient verbalized understanding and agreed with plan of care.      SULEIMAN REPORT    As part of the patient's treatment plan, I am prescribing controlled substances. The patient has been made aware of appropriate  use of such medications, including potential risk of somnolence, limited ability to drive and/or work safely, and the potential for dependence or overdose. It has also bee made clear that these medications are for use by this patient only, without concomitant use of alcohol or other substances unless prescribed.     Patient has completed prescribing agreement detailing terms of continued prescribing of controlled substances, including monitoring SULEIMAN reports, urine drug screening, and pill counts if necessary. The patient is aware that inappropriate use will results in cessation of prescribing such medications.    SULEIMAN report has been reviewed and scanned into the patient's chart.    As the clinician, I personally reviewed the SULEIMAN from 6-26-19 while the patient was in the office today.    History and physical exam exhibit continued safe and appropriate use of controlled substances.      EMR Dragon/Transcription disclaimer:   Much of this encounter note is an electronic transcription/translation of spoken language to printed text. The electronic translation of spoken language may permit erroneous, or at times, nonsensical words or phrases to be inadvertently transcribed; Although I have reviewed the note for such errors, some may still exist.

## 2019-07-08 ENCOUNTER — RESULTS ENCOUNTER (OUTPATIENT)
Dept: PAIN MEDICINE | Facility: CLINIC | Age: 79
End: 2019-07-08

## 2019-07-08 DIAGNOSIS — M54.16 LUMBAR RADICULOPATHY: ICD-10-CM

## 2019-07-08 DIAGNOSIS — G89.4 CHRONIC PAIN SYNDROME: ICD-10-CM

## 2019-07-08 DIAGNOSIS — M47.812 OSTEOARTHRITIS OF CERVICAL SPINE, UNSPECIFIED SPINAL OSTEOARTHRITIS COMPLICATION STATUS: ICD-10-CM

## 2019-07-08 DIAGNOSIS — M54.12 CERVICAL RADICULOPATHY: ICD-10-CM

## 2019-07-08 DIAGNOSIS — Z79.899 ENCOUNTER FOR LONG-TERM (CURRENT) USE OF HIGH-RISK MEDICATION: ICD-10-CM

## 2019-07-08 DIAGNOSIS — M51.36 DEGENERATION OF INTERVERTEBRAL DISC OF LUMBAR REGION: ICD-10-CM

## 2019-07-26 ENCOUNTER — OFFICE VISIT (OUTPATIENT)
Dept: PAIN MEDICINE | Facility: CLINIC | Age: 79
End: 2019-07-26

## 2019-07-26 VITALS
RESPIRATION RATE: 16 BRPM | WEIGHT: 126.4 LBS | TEMPERATURE: 98 F | HEART RATE: 88 BPM | HEIGHT: 69 IN | BODY MASS INDEX: 18.72 KG/M2 | SYSTOLIC BLOOD PRESSURE: 111 MMHG | DIASTOLIC BLOOD PRESSURE: 69 MMHG | OXYGEN SATURATION: 95 %

## 2019-07-26 DIAGNOSIS — Z79.899 ENCOUNTER FOR LONG-TERM (CURRENT) USE OF HIGH-RISK MEDICATION: ICD-10-CM

## 2019-07-26 DIAGNOSIS — M54.16 LUMBAR RADICULOPATHY: ICD-10-CM

## 2019-07-26 DIAGNOSIS — G89.4 CHRONIC PAIN SYNDROME: Primary | ICD-10-CM

## 2019-07-26 DIAGNOSIS — M54.12 CERVICAL RADICULOPATHY: ICD-10-CM

## 2019-07-26 PROCEDURE — 99214 OFFICE O/P EST MOD 30 MIN: CPT | Performed by: NURSE PRACTITIONER

## 2019-07-26 RX ORDER — HYDROCODONE BITARTRATE AND ACETAMINOPHEN 10; 325 MG/1; MG/1
1 TABLET ORAL EVERY 6 HOURS PRN
Qty: 120 TABLET | Refills: 0 | Status: CANCELLED | OUTPATIENT
Start: 2019-07-26

## 2019-07-26 NOTE — PROGRESS NOTES
CHIEF COMPLAINT  F/U back and neck pain- patient states that his pain has improved a little since his last visit.     Subjective   Tanya Jimenes is a 79 y.o. male  who presents to the office for follow-up.He has a history of back and neck pain.    C/o neck and back pain.  Continues with Hydrocodone 10/325 q6hrs (4/day - increased from 7.5 at previous office visit), gabapentin 800 mg 2/day. Denies any side effects from the regimen. The regimen helps decrease his pain by 50%, he is meeting his functional goals, ADL's by self.     Reports improvement with dose adjustment. Denies adverse reactions.      Neck Pain    This is a chronic problem. The current episode started more than 1 year ago. The problem occurs constantly. Progression since onset: unchanged from last office visit. The pain is at a severity of 7/10. The pain is moderate. The symptoms are aggravated by position and bending. Pertinent negatives include no chest pain, fever, headaches, numbness, visual change or weakness. He has tried neck support and oral narcotics (gabapentin, Hydrocodone) for the symptoms. The treatment provided moderate relief.   Back Pain   This is a chronic problem. The current episode started more than 1 year ago. The problem occurs constantly. Progression since onset: unchanged from last office visit. The pain is at a severity of 7/10. The pain is moderate. Pertinent negatives include no abdominal pain, bladder incontinence, bowel incontinence, chest pain, dysuria, fever, headaches, numbness or weakness. He has tried analgesics (gabapentin, Hydrocodone) for the symptoms. The treatment provided moderate relief.      PEG Assessment   What number best describes your pain on average in the past week?5  What number best describes how, during the past week, pain has interfered with your enjoyment of life?7  What number best describes how, during the past week, pain has interfered with your general activity?  7    The following portions of  "the patient's history were reviewed and updated as appropriate: allergies, current medications, past family history, past medical history, past social history, past surgical history and problem list.    Review of Systems   Constitutional: Positive for activity change (decreased). Negative for fatigue and fever.   HENT: Negative for dental problem.    Respiratory: Positive for shortness of breath (r/t copd). Negative for chest tightness.    Cardiovascular: Negative for chest pain.   Gastrointestinal: Negative for abdominal pain, bowel incontinence, constipation and diarrhea.   Genitourinary: Negative for bladder incontinence, difficulty urinating and dysuria.   Musculoskeletal: Positive for back pain. Negative for neck pain.   Neurological: Negative for dizziness, weakness, light-headedness, numbness and headaches.   Psychiatric/Behavioral: Negative for agitation and sleep disturbance. The patient is not nervous/anxious.      Vitals:    07/26/19 1344   BP: 111/69   Pulse: 88   Resp: 16   Temp: 98 °F (36.7 °C)   SpO2: 95%   Weight: 57.3 kg (126 lb 6.4 oz)   Height: 175.3 cm (69\")   PainSc:   5   PainLoc: Back     Objective   Physical Exam   Constitutional: He is oriented to person, place, and time. He appears well-developed and well-nourished. No distress.   HENT:   Head: Normocephalic and atraumatic.   Eyes: Conjunctivae and EOM are normal.   Neck: Neck supple.   Cardiovascular: Normal rate.   Pulmonary/Chest: Effort normal. No respiratory distress.   Musculoskeletal:        Cervical back: He exhibits decreased range of motion, tenderness, pain and spasm.        Lumbar back: He exhibits decreased range of motion, tenderness, pain and spasm.   Neurological: He is alert and oriented to person, place, and time. He has normal strength. No sensory deficit. Gait normal.   Skin: Skin is warm and dry. He is not diaphoretic.   Psychiatric: He has a normal mood and affect. His behavior is normal.   Nursing note and vitals " reviewed.    Assessment/Plan   Tanya was seen today for back pain.    Diagnoses and all orders for this visit:    Chronic pain syndrome    Cervical radiculopathy    Lumbar radiculopathy    Encounter for long-term (current) use of high-risk medication    Other orders  -     Cancel: HYDROcodone-acetaminophen (NORCO)  MG per tablet; Take 1 tablet by mouth Every 6 (Six) Hours As Needed for Moderate Pain .      --- Continue Hydrocodone, too early for refill today. Patient appears stable with current regimen. No adverse effects. Regarding continuation of opioids, there is no evidence of aberrant behavior or any red flags.  The patient continues with appropriate response to opioid therapy. ADL's remain intact by self.   --- The urine drug screen confirmation from 6/27/19 has been reviewed and the result is appropriate based on patient history and SULEIMAN report  --- Follow-up 1 month          SULEIMAN REPORT    As part of the patient's treatment plan, I am prescribing controlled substances. The patient has been made aware of appropriate use of such medications, including potential risk of somnolence, limited ability to drive and/or work safely, and the potential for dependence or overdose. It has also bee made clear that these medications are for use by this patient only, without concomitant use of alcohol or other substances unless prescribed.     Patient has completed prescribing agreement detailing terms of continued prescribing of controlled substances, including monitoring SULEIMAN reports, urine drug screening, and pill counts if necessary. The patient is aware that inappropriate use will results in cessation of prescribing such medications.    SULEIMAN report has been reviewed and scanned into the patient's chart.    As the clinician, I personally reviewed the SULEIMAN from 7/26/19 while the patient was in the office today.    History and physical exam exhibit continued safe and appropriate use of controlled  substances.    EMR Dragon/Transcription disclaimer:   Much of this encounter note is an electronic transcription/translation of spoken language to printed text. The electronic translation of spoken language may permit erroneous, or at times, nonsensical words or phrases to be inadvertently transcribed; Although I have reviewed the note for such errors, some may still exist.

## 2019-08-05 RX ORDER — HYDROCODONE BITARTRATE AND ACETAMINOPHEN 10; 325 MG/1; MG/1
1 TABLET ORAL EVERY 6 HOURS PRN
Qty: 120 TABLET | Refills: 0 | Status: SHIPPED | OUTPATIENT
Start: 2019-08-05 | End: 2019-08-30 | Stop reason: SDUPTHER

## 2019-08-05 NOTE — TELEPHONE ENCOUNTER
Medication Refill Request    Date of phone call: 8/5/2019    Medication being requested:Norco  mg  Sig:Take 1 tablet by mouth every 6 hours as needed for Moderate pain   Qty: 120    Date of last visit: 7/26/2019    Date of last refill: 6/27/2019    SULEIMAN up to date?: yes    Next Follow up?: 8/30/2019    Any new pertinent information? (i.e, new medication allergies, new use of medications, change in patient's health or condition, non-compliance or inconsistency with prescribing agreement?):

## 2019-08-23 RX ORDER — GABAPENTIN 800 MG/1
800 TABLET ORAL 2 TIMES DAILY
Qty: 180 TABLET | Refills: 1 | Status: SHIPPED | OUTPATIENT
Start: 2019-08-23 | End: 2020-02-24 | Stop reason: SDUPTHER

## 2019-08-23 NOTE — TELEPHONE ENCOUNTER
Medication Refill Request    Date of phone call: 19    Medication being requested: Gabapentin 800 mg   si tab po BID  Qty: 180    Date of last visit: 19    Date of last refill: 19    SULEIMAN up to date?: yes    Next Follow up?: 19    Any new pertinent information? (i.e, new medication allergies, new use of medications, change in patient's health or condition, non-compliance or inconsistency with prescribing agreement?):

## 2019-08-30 ENCOUNTER — OFFICE VISIT (OUTPATIENT)
Dept: PAIN MEDICINE | Facility: CLINIC | Age: 79
End: 2019-08-30

## 2019-08-30 VITALS
HEIGHT: 69 IN | TEMPERATURE: 97.7 F | BODY MASS INDEX: 18.93 KG/M2 | DIASTOLIC BLOOD PRESSURE: 65 MMHG | SYSTOLIC BLOOD PRESSURE: 107 MMHG | RESPIRATION RATE: 18 BRPM | HEART RATE: 92 BPM | OXYGEN SATURATION: 98 % | WEIGHT: 127.8 LBS

## 2019-08-30 DIAGNOSIS — Z79.899 ENCOUNTER FOR LONG-TERM (CURRENT) USE OF HIGH-RISK MEDICATION: ICD-10-CM

## 2019-08-30 DIAGNOSIS — M47.812 OSTEOARTHRITIS OF CERVICAL SPINE, UNSPECIFIED SPINAL OSTEOARTHRITIS COMPLICATION STATUS: ICD-10-CM

## 2019-08-30 DIAGNOSIS — M54.16 LUMBAR RADICULOPATHY: ICD-10-CM

## 2019-08-30 DIAGNOSIS — M51.36 DEGENERATION OF INTERVERTEBRAL DISC OF LUMBAR REGION: ICD-10-CM

## 2019-08-30 DIAGNOSIS — M54.12 CERVICAL RADICULOPATHY: ICD-10-CM

## 2019-08-30 DIAGNOSIS — G89.29 OTHER CHRONIC PAIN: Primary | ICD-10-CM

## 2019-08-30 PROCEDURE — 99214 OFFICE O/P EST MOD 30 MIN: CPT | Performed by: NURSE PRACTITIONER

## 2019-08-30 RX ORDER — RANITIDINE 150 MG/1
150 TABLET ORAL NIGHTLY
COMMUNITY
End: 2022-08-15 | Stop reason: SDUPTHER

## 2019-08-30 RX ORDER — CLOTRIMAZOLE 1 %
CREAM (GRAM) TOPICAL
Refills: 3 | COMMUNITY
Start: 2019-08-21

## 2019-08-30 RX ORDER — HYDROCODONE BITARTRATE AND ACETAMINOPHEN 10; 325 MG/1; MG/1
1 TABLET ORAL EVERY 6 HOURS PRN
Qty: 120 TABLET | Refills: 0 | Status: SHIPPED | OUTPATIENT
Start: 2019-08-30 | End: 2019-09-27 | Stop reason: SDUPTHER

## 2019-09-27 RX ORDER — HYDROCODONE BITARTRATE AND ACETAMINOPHEN 10; 325 MG/1; MG/1
1 TABLET ORAL EVERY 6 HOURS PRN
Qty: 120 TABLET | Refills: 0 | Status: SHIPPED | OUTPATIENT
Start: 2019-09-27 | End: 2019-10-29 | Stop reason: SDUPTHER

## 2019-10-29 ENCOUNTER — OFFICE VISIT (OUTPATIENT)
Dept: PAIN MEDICINE | Facility: CLINIC | Age: 79
End: 2019-10-29

## 2019-10-29 VITALS
HEIGHT: 69 IN | BODY MASS INDEX: 18.63 KG/M2 | TEMPERATURE: 97.5 F | OXYGEN SATURATION: 95 % | SYSTOLIC BLOOD PRESSURE: 110 MMHG | RESPIRATION RATE: 18 BRPM | WEIGHT: 125.8 LBS | DIASTOLIC BLOOD PRESSURE: 71 MMHG | HEART RATE: 91 BPM

## 2019-10-29 DIAGNOSIS — M47.812 OSTEOARTHRITIS OF CERVICAL SPINE, UNSPECIFIED SPINAL OSTEOARTHRITIS COMPLICATION STATUS: ICD-10-CM

## 2019-10-29 DIAGNOSIS — G89.4 CHRONIC PAIN SYNDROME: Primary | ICD-10-CM

## 2019-10-29 DIAGNOSIS — M51.36 DEGENERATION OF INTERVERTEBRAL DISC OF LUMBAR REGION: ICD-10-CM

## 2019-10-29 DIAGNOSIS — Z79.899 ENCOUNTER FOR LONG-TERM (CURRENT) USE OF HIGH-RISK MEDICATION: ICD-10-CM

## 2019-10-29 PROCEDURE — 99214 OFFICE O/P EST MOD 30 MIN: CPT | Performed by: NURSE PRACTITIONER

## 2019-10-29 RX ORDER — HYDROCODONE BITARTRATE AND ACETAMINOPHEN 10; 325 MG/1; MG/1
1 TABLET ORAL EVERY 6 HOURS PRN
Qty: 120 TABLET | Refills: 0 | Status: SHIPPED | OUTPATIENT
Start: 2019-10-29 | End: 2019-11-25 | Stop reason: SDUPTHER

## 2019-10-29 NOTE — PROGRESS NOTES
CHIEF COMPLAINT  Follow-up for back pain. Mr. Jimenes states that his back pain is unchanged.    Subjective   Tanya Jimenes is a 79 y.o. male  who presents to the office for follow-up.He has a history of neck and back pain.    C/o neck and back pain.  Pain today 4/10 in severity, stable with regimen.  Continues with Hydrocodone 10/325 q6hrs, gabapentin 800 mg 2/day. Denies any side effects from the regimen. The regimen helps decrease his pain by 50%, he is meeting his functional goals, ADL's by self.     Neck Pain    This is a chronic problem. The current episode started more than 1 year ago. The problem occurs constantly. Progression since onset: unchanged from last office visit. The pain is at a severity of 4/10. The pain is moderate. The symptoms are aggravated by position and bending. Pertinent negatives include no chest pain, fever, headaches, numbness, visual change or weakness. He has tried neck support and oral narcotics (gabapentin, Hydrocodone) for the symptoms. The treatment provided moderate relief.   Back Pain   This is a chronic problem. The current episode started more than 1 year ago. The problem occurs constantly. Progression since onset: unchanged from last office visit. The pain is at a severity of 4/10. The pain is moderate. Pertinent negatives include no abdominal pain, bladder incontinence, bowel incontinence, chest pain, dysuria, fever, headaches, numbness or weakness. He has tried analgesics (gabapentin, Hydrocodone) for the symptoms. The treatment provided moderate relief.      PEG Assessment   What number best describes your pain on average in the past week?4  What number best describes how, during the past week, pain has interfered with your enjoyment of life?5  What number best describes how, during the past week, pain has interfered with your general activity?  5    The following portions of the patient's history were reviewed and updated as appropriate: allergies, current medications, past  "family history, past medical history, past social history, past surgical history and problem list.    Review of Systems   Constitutional: Negative for fatigue and fever.   HENT: Positive for congestion.    Eyes: Negative for visual disturbance.   Respiratory: Positive for shortness of breath. Negative for cough and wheezing.    Cardiovascular: Negative.  Negative for chest pain.   Gastrointestinal: Negative for abdominal pain, bowel incontinence, constipation and diarrhea.   Genitourinary: Negative for bladder incontinence, difficulty urinating and dysuria.   Musculoskeletal: Positive for back pain. Negative for neck pain.   Neurological: Negative for weakness, numbness and headaches.   Psychiatric/Behavioral: Positive for sleep disturbance. Negative for suicidal ideas. The patient is not nervous/anxious.      Vitals:    10/29/19 1439   BP: 110/71   Pulse: 91   Resp: 18   Temp: 97.5 °F (36.4 °C)   SpO2: 95%   Weight: 57.1 kg (125 lb 12.8 oz)   Height: 175.3 cm (69\")   PainSc:   4   PainLoc: Back     Objective   Physical Exam   Constitutional: He is oriented to person, place, and time. He appears well-developed and well-nourished. No distress.   HENT:   Head: Normocephalic and atraumatic.   Eyes: Conjunctivae and EOM are normal.   Neck: Neck supple.   Cardiovascular: Normal rate.   Pulmonary/Chest: Effort normal. No respiratory distress.   Musculoskeletal:        Cervical back: He exhibits decreased range of motion, tenderness, pain and spasm.        Lumbar back: He exhibits decreased range of motion, tenderness, pain and spasm.   Neurological: He is alert and oriented to person, place, and time. He has normal strength. No sensory deficit. Gait normal.   Skin: Skin is warm and dry. He is not diaphoretic.   Psychiatric: He has a normal mood and affect. His behavior is normal.   Nursing note and vitals reviewed.    Assessment/Plan   Tanya was seen today for back pain.    Diagnoses and all orders for this " visit:    Chronic pain syndrome    Degeneration of intervertebral disc of lumbar region    Osteoarthritis of cervical spine, unspecified spinal osteoarthritis complication status    Encounter for long-term (current) use of high-risk medication    Other orders  -     HYDROcodone-acetaminophen (NORCO)  MG per tablet; Take 1 tablet by mouth Every 6 (Six) Hours As Needed for Moderate Pain .      --- Refill Hydrocodone. Patient appears stable with current regimen. No adverse effects. Regarding continuation of opioids, there is no evidence of aberrant behavior or any red flags.  The patient continues with appropriate response to opioid therapy. ADL's remain intact by self.   --- The urine drug screen confirmation from 6/27/19 has been reviewed and the result is appropriate based on patient history and SULEIMAN report  --- Follow-up 2 months        SULEIMAN REPORT  As part of the patient's treatment plan, I am prescribing controlled substances. The patient has been made aware of appropriate use of such medications, including potential risk of somnolence, limited ability to drive and/or work safely, and the potential for dependence or overdose. It has also bee made clear that these medications are for use by this patient only, without concomitant use of alcohol or other substances unless prescribed.     Patient has completed prescribing agreement detailing terms of continued prescribing of controlled substances, including monitoring SULEIMAN reports, urine drug screening, and pill counts if necessary. The patient is aware that inappropriate use will results in cessation of prescribing such medications.    SULEIMAN report has been reviewed and scanned into the patient's chart.    As the clinician, I personally reviewed the SULEIMAN from 10/29/19 while the patient was in the office today.    History and physical exam exhibit continued safe and appropriate use of controlled substances.    EMR Dragon/Transcription disclaimer:    Much of this encounter note is an electronic transcription/translation of spoken language to printed text. The electronic translation of spoken language may permit erroneous, or at times, nonsensical words or phrases to be inadvertently transcribed; Although I have reviewed the note for such errors, some may still exist.

## 2019-11-25 RX ORDER — HYDROCODONE BITARTRATE AND ACETAMINOPHEN 10; 325 MG/1; MG/1
1 TABLET ORAL EVERY 6 HOURS PRN
Qty: 120 TABLET | Refills: 0 | Status: SHIPPED | OUTPATIENT
Start: 2019-11-25 | End: 2019-12-23 | Stop reason: SDUPTHER

## 2019-11-25 NOTE — TELEPHONE ENCOUNTER
Medication Refill Request    Date of phone call: 19    Medication being requested: hydro/apap  mg si tab q 6 hrs  Qty: 120    Date of last visit: 10/29/19    Date of last refill: 19    SULEIMAN up to date?: yes    Next Follow up?: 19    Any new pertinent information? (i.e, new medication allergies, new use of medications, change in patient's health or condition, non-compliance or inconsistency with prescribing agreement?):

## 2019-12-23 ENCOUNTER — OFFICE VISIT (OUTPATIENT)
Dept: PAIN MEDICINE | Facility: CLINIC | Age: 79
End: 2019-12-23

## 2019-12-23 VITALS
HEART RATE: 78 BPM | OXYGEN SATURATION: 96 % | HEIGHT: 69 IN | TEMPERATURE: 98.1 F | RESPIRATION RATE: 16 BRPM | WEIGHT: 126.8 LBS | BODY MASS INDEX: 18.78 KG/M2 | SYSTOLIC BLOOD PRESSURE: 124 MMHG | DIASTOLIC BLOOD PRESSURE: 71 MMHG

## 2019-12-23 DIAGNOSIS — M47.812 OSTEOARTHRITIS OF CERVICAL SPINE, UNSPECIFIED SPINAL OSTEOARTHRITIS COMPLICATION STATUS: ICD-10-CM

## 2019-12-23 DIAGNOSIS — Z79.899 ENCOUNTER FOR LONG-TERM (CURRENT) USE OF HIGH-RISK MEDICATION: ICD-10-CM

## 2019-12-23 DIAGNOSIS — M51.36 DEGENERATION OF INTERVERTEBRAL DISC OF LUMBAR REGION: ICD-10-CM

## 2019-12-23 DIAGNOSIS — G89.4 CHRONIC PAIN SYNDROME: Primary | ICD-10-CM

## 2019-12-23 PROCEDURE — 80305 DRUG TEST PRSMV DIR OPT OBS: CPT | Performed by: NURSE PRACTITIONER

## 2019-12-23 PROCEDURE — 99214 OFFICE O/P EST MOD 30 MIN: CPT | Performed by: NURSE PRACTITIONER

## 2019-12-23 RX ORDER — HYDROCODONE BITARTRATE AND ACETAMINOPHEN 10; 325 MG/1; MG/1
1 TABLET ORAL EVERY 6 HOURS PRN
Qty: 120 TABLET | Refills: 0 | Status: SHIPPED | OUTPATIENT
Start: 2019-12-23 | End: 2020-01-24 | Stop reason: SDUPTHER

## 2019-12-23 NOTE — PROGRESS NOTES
"CHIEF COMPLAINT  F/U back pain- patient states that his pain has remained the same.     This is initial evaluation by TAYLER Ochoa   Tanya Jimenes is a 79 y.o. male  who presents to the office for follow-up.He has a history of back pain.  Today his pain is 4/10VAS.  He describes his pain as constant aching.  His neck pain is worse while laying in bed, twisting, bending.  His back pain is worse with prolonged walking, bending, prolonged sitting, twisting, lifting. His pain is improved with medication, changing position.  Patient states heat does not help anymore.  Continues with Hydrocodone 10/325 every 6 hours, gabapentin 800 mg 2/day. Denies any side effects from the regimen including somnolence or constipation. The regimen helps decrease his pain a moderate amount. \"It helps it that I can get around pretty good\".  He is meeting his functional goals, ADL's by self, states difficulty with showering because of bending and twisting, still able to complete this himself. Denies any changes to his bowel or bladder.     Neck Pain    This is a chronic problem. The current episode started more than 1 year ago. The problem occurs constantly. The problem has been unchanged. The pain is associated with a sleep position (difficult to get comfortable while sleeping). The pain is at a severity of 4/10. The pain is moderate. The symptoms are aggravated by position, bending and twisting. Associated symptoms include weakness. Pertinent negatives include no chest pain, fever, headaches, numbness or visual change. He has tried neck support and oral narcotics (gabapentin, Hydrocodone) for the symptoms. The treatment provided moderate relief.   Back Pain   This is a chronic problem. The current episode started more than 1 year ago. The problem occurs constantly. The problem is unchanged. The quality of the pain is described as aching. The pain is at a severity of 4/10. The pain is moderate. The symptoms are aggravated " "by position, standing, twisting and bending. Associated symptoms include abdominal pain and weakness. Pertinent negatives include no bladder incontinence, bowel incontinence, chest pain, dysuria, fever, headaches or numbness. He has tried analgesics (gabapentin, Hydrocodone, changing position ) for the symptoms. The treatment provided moderate relief.      PEG Assessment   What number best describes your pain on average in the past week?4  What number best describes how, during the past week, pain has interfered with your enjoyment of life?5  What number best describes how, during the past week, pain has interfered with your general activity?  5    The following portions of the patient's history were reviewed and updated as appropriate: allergies, current medications, past family history, past medical history, past social history, past surgical history and problem list.    Review of Systems   Constitutional: Negative for fatigue and fever.   HENT: Negative for congestion.    Eyes: Negative for visual disturbance.   Respiratory: Negative for cough, shortness of breath and wheezing.    Cardiovascular: Negative.  Negative for chest pain.   Gastrointestinal: Positive for abdominal pain. Negative for bowel incontinence, constipation and diarrhea.   Genitourinary: Negative for bladder incontinence, difficulty urinating and dysuria.   Musculoskeletal: Positive for back pain. Negative for neck pain.   Neurological: Positive for weakness. Negative for dizziness, light-headedness, numbness and headaches.   Psychiatric/Behavioral: Positive for sleep disturbance. Negative for agitation and suicidal ideas. The patient is not nervous/anxious.      Vitals:    12/23/19 1436   BP: 124/71   Pulse: 78   Resp: 16   Temp: 98.1 °F (36.7 °C)   SpO2: 96%   Weight: 57.5 kg (126 lb 12.8 oz)   Height: 175.3 cm (69\")   PainSc:   4   PainLoc: Back     Objective   Physical Exam   Constitutional: He is oriented to person, place, and time. He " appears well-developed.   HENT:   Head: Normocephalic and atraumatic.   Eyes: Pupils are equal, round, and reactive to light. EOM are normal.   Neck: Decreased range of motion present.   + Loading Maneuver    Cardiovascular: Normal rate.   Pulmonary/Chest: Effort normal.   Musculoskeletal:        Lumbar back: He exhibits tenderness and pain.   + Bilateral SLR   Neurological: He is alert and oriented to person, place, and time. Gait abnormal.   Reflex Scores:       Tricep reflexes are 2+ on the right side and 2+ on the left side.       Bicep reflexes are 2+ on the right side and 2+ on the left side.       Brachioradialis reflexes are 2+ on the right side and 2+ on the left side.       Patellar reflexes are 2+ on the right side and 2+ on the left side.       Achilles reflexes are 2+ on the right side and 2+ on the left side.  Skin: Skin is warm and dry.   Psychiatric: He has a normal mood and affect.     Assessment/Plan   Tanya was seen today for back pain.    Diagnoses and all orders for this visit:    Chronic pain syndrome    Osteoarthritis of cervical spine, unspecified spinal osteoarthritis complication status    Degeneration of intervertebral disc of lumbar region    Encounter for long-term (current) use of high-risk medication      --- Routine UDS in office today as part of monitoring requirements for controlled substances.  The specimen was viewed and the immunoassay result reviewed and is + OPI.  This specimen will be sent to [facility] laboratory for confirmation.     --- Refill Hydrocodone 10/325 q6h PRN pain. DNF 1/2/20 applied.  Patient appears stable with current regimen. No adverse effects. Regarding continuation of opioids, there is no evidence of aberrant behavior or any red flags.  The patient continues with appropriate response to opioid therapy. ADL's remain intact by self.   --- Follow-up 2 months     SULEIMAN REPORT    As part of the patient's treatment plan, I am prescribing controlled substances.  The patient has been made aware of appropriate use of such medications, including potential risk of somnolence, limited ability to drive and/or work safely, and the potential for dependence or overdose. It has also bee made clear that these medications are for use by this patient only, without concomitant use of alcohol or other substances unless prescribed.     Patient has completed prescribing agreement detailing terms of continued prescribing of controlled substances, including monitoring SULEIMAN reports, urine drug screening, and pill counts if necessary. The patient is aware that inappropriate use will results in cessation of prescribing such medications.    SULEIMAN report has been reviewed and scanned into the patient's chart.    As the clinician, I personally reviewed the SULEIMAN from 12/20/19 while the patient was in the office today.    History and physical exam exhibit continued safe and appropriate use of controlled substances.    EMR Dragon/Transcription disclaimer:   Much of this encounter note is an electronic transcription/translation of spoken language to printed text. The electronic translation of spoken language may permit erroneous, or at times, nonsensical words or phrases to be inadvertently transcribed; Although I have reviewed the note for such errors, some may still exist.

## 2019-12-24 ENCOUNTER — RESULTS ENCOUNTER (OUTPATIENT)
Dept: PAIN MEDICINE | Facility: CLINIC | Age: 79
End: 2019-12-24

## 2019-12-24 DIAGNOSIS — Z79.899 ENCOUNTER FOR LONG-TERM (CURRENT) USE OF HIGH-RISK MEDICATION: ICD-10-CM

## 2019-12-24 DIAGNOSIS — M51.36 DEGENERATION OF INTERVERTEBRAL DISC OF LUMBAR REGION: ICD-10-CM

## 2019-12-24 DIAGNOSIS — M47.812 OSTEOARTHRITIS OF CERVICAL SPINE, UNSPECIFIED SPINAL OSTEOARTHRITIS COMPLICATION STATUS: ICD-10-CM

## 2019-12-24 DIAGNOSIS — G89.4 CHRONIC PAIN SYNDROME: ICD-10-CM

## 2019-12-24 LAB
POC AMPHETAMINES: NEGATIVE
POC BARBITURATES: NEGATIVE
POC BENZODIAZEPHINES: NEGATIVE
POC COCAINE: NEGATIVE
POC METHADONE: NEGATIVE
POC METHAMPHETAMINE SCREEN URINE: NEGATIVE
POC OPIATES: POSITIVE
POC OXYCODONE: NEGATIVE
POC PHENCYCLIDINE: NEGATIVE
POC PROPOXYPHENE: NEGATIVE
POC THC: NEGATIVE
POC TRICYCLIC ANTIDEPRESSANTS: NEGATIVE

## 2020-01-24 NOTE — TELEPHONE ENCOUNTER
Medication Refill Request    Date of phone call: 20    Medication being requested: norco 10/325mg si tab po q 6 hours prn   Qty: 120    Date of last visit: 19    Date of last refill: 19    SULEIMAN up to date?: yes    Next Follow up?: 20    Any new pertinent information? (i.e, new medication allergies, new use of medications, change in patient's health or condition, non-compliance or inconsistency with prescribing agreement?):

## 2020-01-28 RX ORDER — HYDROCODONE BITARTRATE AND ACETAMINOPHEN 10; 325 MG/1; MG/1
1 TABLET ORAL EVERY 6 HOURS PRN
Qty: 120 TABLET | Refills: 0 | Status: SHIPPED | OUTPATIENT
Start: 2020-01-28 | End: 2020-01-28

## 2020-01-28 NOTE — TELEPHONE ENCOUNTER
Pt just called and stated his [facility] pharmacy is out of stock of norco, I told him to call me back when he found a pharmacy with enough norco stock then I will reroute med to you. Thank you.

## 2020-01-29 RX ORDER — HYDROCODONE BITARTRATE AND ACETAMINOPHEN 10; 325 MG/1; MG/1
1 TABLET ORAL EVERY 6 HOURS PRN
Qty: 120 TABLET | Refills: 0 | Status: SHIPPED | OUTPATIENT
Start: 2020-01-29 | End: 2020-02-24 | Stop reason: SDUPTHER

## 2020-02-24 ENCOUNTER — OFFICE VISIT (OUTPATIENT)
Dept: PAIN MEDICINE | Facility: CLINIC | Age: 80
End: 2020-02-24

## 2020-02-24 VITALS
HEIGHT: 69 IN | SYSTOLIC BLOOD PRESSURE: 120 MMHG | DIASTOLIC BLOOD PRESSURE: 74 MMHG | BODY MASS INDEX: 18.51 KG/M2 | WEIGHT: 125 LBS | HEART RATE: 88 BPM | RESPIRATION RATE: 20 BRPM | OXYGEN SATURATION: 98 % | TEMPERATURE: 96.6 F

## 2020-02-24 DIAGNOSIS — M51.36 DEGENERATION OF INTERVERTEBRAL DISC OF LUMBAR REGION: ICD-10-CM

## 2020-02-24 DIAGNOSIS — Z79.899 ENCOUNTER FOR LONG-TERM (CURRENT) USE OF HIGH-RISK MEDICATION: ICD-10-CM

## 2020-02-24 DIAGNOSIS — M47.812 OSTEOARTHRITIS OF CERVICAL SPINE, UNSPECIFIED SPINAL OSTEOARTHRITIS COMPLICATION STATUS: ICD-10-CM

## 2020-02-24 DIAGNOSIS — G89.4 CHRONIC PAIN SYNDROME: Primary | ICD-10-CM

## 2020-02-24 PROCEDURE — 99214 OFFICE O/P EST MOD 30 MIN: CPT | Performed by: NURSE PRACTITIONER

## 2020-02-24 RX ORDER — PANTOPRAZOLE SODIUM 40 MG/1
TABLET, DELAYED RELEASE ORAL
COMMUNITY
Start: 2020-01-27

## 2020-02-24 RX ORDER — PREDNISONE 10 MG/1
TABLET ORAL
COMMUNITY
Start: 2020-02-18 | End: 2021-08-16

## 2020-02-24 RX ORDER — HYDROCODONE BITARTRATE AND ACETAMINOPHEN 10; 325 MG/1; MG/1
1 TABLET ORAL EVERY 6 HOURS PRN
Qty: 120 TABLET | Refills: 0 | Status: SHIPPED | OUTPATIENT
Start: 2020-02-24 | End: 2020-02-26 | Stop reason: SDUPTHER

## 2020-02-24 RX ORDER — GABAPENTIN 800 MG/1
800 TABLET ORAL 2 TIMES DAILY
Qty: 180 TABLET | Refills: 1 | Status: SHIPPED | OUTPATIENT
Start: 2020-02-24 | End: 2020-08-28 | Stop reason: SDUPTHER

## 2020-02-24 RX ORDER — DOXEPIN HYDROCHLORIDE 75 MG/1
CAPSULE ORAL
COMMUNITY
Start: 2020-02-07 | End: 2022-08-15 | Stop reason: SDUPTHER

## 2020-02-24 RX ORDER — INSULIN DETEMIR 100 [IU]/ML
INJECTION, SOLUTION SUBCUTANEOUS
COMMUNITY
Start: 2019-12-04

## 2020-02-24 RX ORDER — LEVOCETIRIZINE DIHYDROCHLORIDE 5 MG/1
TABLET, FILM COATED ORAL
COMMUNITY
Start: 2019-12-30

## 2020-02-24 NOTE — PROGRESS NOTES
CHIEF COMPLAINT  F/u back pain. Pt sts he recently suffered the loss of wife on 2/15/20. Pt sts he has pain all over since then.     Subjective   Tanya Jimenes is a 79 y.o. male  who presents to the office for follow-up.He has a history of chronic back and neck pain. Reports his pain is relatively unchanged since last office visit.    Complains of pain in his neck and low back. Today his pain is 1/10VAS. Describes the pain as nearly continuous aching and throbbing. Pain increases with activity, stress, household chores; pain decreases with medication and rest. Continues with Hydrocodone 7.5/325 4/day and gabapentin 800 mg 3/day. Denies any side effects from the regimen. The regimen helps decrease his pain by 40-50%. ADL's by self.  Does not take Klonopin at same time as Hydrocodone.      Cannot take NSAIDs due to CKD.    Neck Pain    This is a chronic problem. The current episode started more than 1 year ago. The problem occurs constantly. Progression since onset: unchanged from last office visit. The pain is at a severity of 1/10. The pain is moderate. The symptoms are aggravated by position and bending. Associated symptoms include weakness (gen). Pertinent negatives include no chest pain, fever, headaches, numbness or visual change. He has tried neck support and oral narcotics (gabapentin, Hydrocodone) for the symptoms. The treatment provided moderate relief.   Back Pain   This is a chronic problem. The current episode started more than 1 year ago. The problem occurs constantly. Progression since onset: unchanged from last office visit. The pain is at a severity of 1/10. The pain is moderate. Associated symptoms include weakness (gen). Pertinent negatives include no abdominal pain, bladder incontinence, bowel incontinence, chest pain, dysuria, fever, headaches or numbness. He has tried analgesics (gabapentin, Hydrocodone) for the symptoms. The treatment provided moderate relief.      PEG Assessment   What number  "best describes your pain on average in the past week?4  What number best describes how, during the past week, pain has interfered with your enjoyment of life?3  What number best describes how, during the past week, pain has interfered with your general activity?  5    The following portions of the patient's history were reviewed and updated as appropriate: allergies, current medications, past family history, past medical history, past social history, past surgical history and problem list.    Review of Systems   Constitutional: Positive for activity change (dec) and fatigue. Negative for fever.   HENT: Negative for congestion.    Eyes: Negative for visual disturbance.   Respiratory: Negative for cough, shortness of breath and wheezing.    Cardiovascular: Negative.  Negative for chest pain.   Gastrointestinal: Negative for abdominal pain, bowel incontinence, constipation and diarrhea.   Genitourinary: Negative for bladder incontinence, difficulty urinating and dysuria.   Musculoskeletal: Positive for back pain. Negative for gait problem and neck pain.   Neurological: Positive for weakness (gen). Negative for dizziness, light-headedness, numbness and headaches.   Psychiatric/Behavioral: Positive for decreased concentration (wife recent death) and sleep disturbance. Negative for agitation and suicidal ideas. The patient is not nervous/anxious.        Vitals:    02/24/20 1409   BP: 120/74   Pulse: 88   Resp: 20   Temp: 96.6 °F (35.9 °C)   SpO2: 98%   Weight: 56.7 kg (125 lb)   Height: 175.3 cm (69\")   PainSc: 0-No pain   PainLoc: Back     Objective   Physical Exam   Constitutional: He is oriented to person, place, and time. Vital signs are normal. He appears well-developed and well-nourished. He is cooperative.   Hard of hearing   HENT:   Head: Normocephalic and atraumatic.   Nose: Nose normal.   Eyes: Conjunctivae and lids are normal.   Neck: Trachea normal. Neck supple. Muscular tenderness present. Decreased range of " motion present.   Cardiovascular: Normal rate.   Pulmonary/Chest: Effort normal.   Abdominal: Normal appearance.   Musculoskeletal:        Cervical back: He exhibits tenderness.        Lumbar back: He exhibits tenderness.   Neurological: He is alert and oriented to person, place, and time. Gait normal.   Skin: Skin is warm, dry and intact.   Psychiatric: He has a normal mood and affect. His speech is normal and behavior is normal. Thought content normal.   Nursing note and vitals reviewed.    Assessment/Plan   Tanya was seen today for back pain.    Diagnoses and all orders for this visit:    Chronic pain syndrome    Degeneration of intervertebral disc of lumbar region    Osteoarthritis of cervical spine, unspecified spinal osteoarthritis complication status    Encounter for long-term (current) use of high-risk medication    Other orders  -     HYDROcodone-acetaminophen (NORCO)  MG per tablet; Take 1 tablet by mouth Every 6 (Six) Hours As Needed for Moderate Pain .  -     gabapentin (NEURONTIN) 800 MG tablet; Take 1 tablet by mouth 2 (Two) Times a Day.      --- The urine drug screen confirmation from 12-23-19 has been reviewed and the result is APPROPRIATE based on patient history and SULEIMAN report  --- Refill Hydrocodone with DNF, also gabapentin. Patient appears stable with current regimen. No adverse effects. Regarding continuation of opioids, there is no evidence of aberrant behavior or any red flags.  The patient continues with appropriate response to opioid therapy. ADL's remain intact by self.   --- Follow-up 2 months or sooner if needed.       SULEIMAN REPORT    As part of the patient's treatment plan, I am prescribing controlled substances. The patient has been made aware of appropriate use of such medications, including potential risk of somnolence, limited ability to drive and/or work safely, and the potential for dependence or overdose. It has also bee made clear that these medications are for use by  this patient only, without concomitant use of alcohol or other substances unless prescribed.     Patient has completed prescribing agreement detailing terms of continued prescribing of controlled substances, including monitoring SULEIMAN reports, urine drug screening, and pill counts if necessary. The patient is aware that inappropriate use will results in cessation of prescribing such medications.    SULEIMAN report has been reviewed and scanned into the patient's chart.    As the clinician, I personally reviewed the SULEIMAN from 2-21-20 while the patient was in the office today.    History and physical exam exhibit continued safe and appropriate use of controlled substances.      EMR Dragon/Transcription disclaimer:   Much of this encounter note is an electronic transcription/translation of spoken language to printed text. The electronic translation of spoken language may permit erroneous, or at times, nonsensical words or phrases to be inadvertently transcribed; Although I have reviewed the note for such errors, some may still exist.

## 2020-02-26 RX ORDER — HYDROCODONE BITARTRATE AND ACETAMINOPHEN 10; 325 MG/1; MG/1
1 TABLET ORAL EVERY 6 HOURS PRN
Qty: 120 TABLET | Refills: 0 | Status: SHIPPED | OUTPATIENT
Start: 2020-02-26 | End: 2020-03-25 | Stop reason: SDUPTHER

## 2020-02-26 NOTE — TELEPHONE ENCOUNTER
Patient called and stated that his pharmacy is out of his medication and he would like you to resend it to [facility].

## 2020-03-25 NOTE — TELEPHONE ENCOUNTER
Medication Refill Request    Date of phone call: 3/25/20    Medication being requested: Norco  mg   si tab po q 6 hrs prn   Qty: 120    Date of last visit: 20    Date of last refill: 20    SULEIMAN up to date?: yes    Next Follow up?: 20    Any new pertinent information? (i.e, new medication allergies, new use of medications, change in patient's health or condition, non-compliance or inconsistency with prescribing agreement?):

## 2020-03-26 RX ORDER — HYDROCODONE BITARTRATE AND ACETAMINOPHEN 10; 325 MG/1; MG/1
1 TABLET ORAL EVERY 6 HOURS PRN
Qty: 120 TABLET | Refills: 0 | Status: SHIPPED | OUTPATIENT
Start: 2020-03-26 | End: 2020-04-24 | Stop reason: SDUPTHER

## 2020-04-24 ENCOUNTER — OFFICE VISIT (OUTPATIENT)
Dept: PAIN MEDICINE | Facility: CLINIC | Age: 80
End: 2020-04-24

## 2020-04-24 DIAGNOSIS — G89.4 CHRONIC PAIN SYNDROME: Primary | ICD-10-CM

## 2020-04-24 DIAGNOSIS — M47.812 OSTEOARTHRITIS OF CERVICAL SPINE, UNSPECIFIED SPINAL OSTEOARTHRITIS COMPLICATION STATUS: ICD-10-CM

## 2020-04-24 DIAGNOSIS — M51.36 DEGENERATION OF INTERVERTEBRAL DISC OF LUMBAR REGION: ICD-10-CM

## 2020-04-24 DIAGNOSIS — Z79.899 ENCOUNTER FOR LONG-TERM (CURRENT) USE OF HIGH-RISK MEDICATION: ICD-10-CM

## 2020-04-24 PROCEDURE — 99441 PR PHYS/QHP TELEPHONE EVALUATION 5-10 MIN: CPT | Performed by: NURSE PRACTITIONER

## 2020-04-24 RX ORDER — HYDROCODONE BITARTRATE AND ACETAMINOPHEN 10; 325 MG/1; MG/1
1 TABLET ORAL EVERY 6 HOURS PRN
Qty: 120 TABLET | Refills: 0 | Status: SHIPPED | OUTPATIENT
Start: 2020-04-24 | End: 2020-05-22 | Stop reason: SDUPTHER

## 2020-04-24 NOTE — PROGRESS NOTES
TELEPHONE VISIT    You have chosen to receive care through a telephone visit. Do you consent to use a telephone visit for your medical care today? Yes    CHIEF COMPLAINT  Neck and back pain    Subjective   Tanya Jimenes is a 80 y.o. male  who presents for a telephonic follow-up.He has a history of chronic neck and back pain. Reports his pain Is WORSE since last office visit..    Complains of pain in his neck and back. Today his pain is 4-5/10VAS. Describes the pain as continuous aching. Pain can interfere with his sleep. He is unsure whether daytime or night-time is worse. Pain increases with standing, walking, activity, prolonged position; pain decreases with medication, rest.  Continues with Hydrocodone 7.5/325 4/day and gabapentin 800 mg 2-3/day. Denies any side effects from the regimen. The regimen helps decrease his pain by 50-75%. ADL's by self. Denies any bowel or bladder changes.  Does not take Klonopin at same time as Hydrocodone.  Reports his PCP's partner recently decreased Klonpopin.  Sees regular PCP April 30th.      Is finishing up Cefdinir for bronchitis.  Cannot take NSAIDs due to CKD.  Does not want to repeat injections. Reports he has issues with his tongue afterwards.  Neck Pain    This is a chronic problem. The current episode started more than 1 year ago. The problem occurs constantly. The pain is moderate. The symptoms are aggravated by position and bending. Associated symptoms include weakness (gen). Pertinent negatives include no chest pain, fever, headaches, numbness or visual change. He has tried neck support and oral narcotics (gabapentin, Hydrocodone) for the symptoms. The treatment provided moderate relief.   Back Pain   This is a chronic problem. The current episode started more than 1 year ago. The problem occurs constantly. The pain is moderate. Associated symptoms include weakness (gen). Pertinent negatives include no abdominal pain, bladder incontinence, bowel incontinence, chest  pain, dysuria, fever, headaches or numbness. He has tried analgesics (gabapentin, Hydrocodone) for the symptoms. The treatment provided moderate relief.      The following portions of the patient's history were reviewed and updated as appropriate: allergies, current medications, past family history, past medical history, past social history, past surgical history and problem list.    Review of Systems   Constitutional: Negative for fever.   Respiratory: Negative for chest tightness and shortness of breath.    Cardiovascular: Negative for chest pain.   Gastrointestinal: Negative for abdominal pain and bowel incontinence.   Genitourinary: Negative for bladder incontinence and dysuria.   Musculoskeletal: Positive for back pain and neck pain.   Neurological: Positive for weakness (gen). Negative for numbness and headaches.   Psychiatric/Behavioral: Positive for sleep disturbance. The patient is nervous/anxious.      There were no vitals filed for this visit.    Objective   Physical Exam  As this is a telephone check-in, the ability to perform a routine physical exam is extremely limited. The patient seems alert and is oriented appropriately.   On this phone call there is not any evidence of respiratory distress.   The patient seems of normal mood.   The remainder of a routine physical exam is deferred.    Assessment/Plan   Diagnoses and all orders for this visit:    Chronic pain syndrome    Osteoarthritis of cervical spine, unspecified spinal osteoarthritis complication status    Degeneration of intervertebral disc of lumbar region    Encounter for long-term (current) use of high-risk medication    Other orders  -     HYDROcodone-acetaminophen (NORCO)  MG per tablet; Take 1 tablet by mouth Every 6 (Six) Hours As Needed for Moderate Pain . DNF until 4-26-20          ----------------      Our practice is offering alternative &/or electronic methods to continue to follow our patients while at the same time further  the efforts toward social distancing, in accordance with our organizational policies, professional societies' guidance, and [facility] mandates.  I support the Healthy at Home campaign and in this visit I have counseled the patient on our needs to limit in-person office visits and physical encounters with medical facilities whenever possible.  I have also educated the patient on the medical necessities of maintaining social distancing while we continue to function during this crisis period.      The patient was counseled on the need to consider telehealth options. The patient had obstacles which preclude consideration for a Video Visit. As a Video Visit was not practical, the patient was offered the option for a Telephone Check-In. The patient agreed to a Telephone Check-In. The patient was counseled on the need for a check-in visit. The patient was educated about our efforts to comply with monitoring standards when prescribing potent medications.    TIME:  Total Time:  9 minutes. Topics discussed are outlined in the Assessment/Plan section of the note.    ----------------    --- The urine drug screen confirmation from 12-23-19 has been reviewed and the result is APPROPRIATE based on patient history and SULEIMAN report  --- Refill Hydrocodone. Patient appears stable with current regimen. No adverse effects. Regarding continuation of opioids, there is no evidence of aberrant behavior or any red flags.  The patient continues with appropriate response to opioid therapy. ADL's remain intact by self.   --- Follow-up 2 months or sooner if needed.     SULEIMAN REPORT    As part of the patient's treatment plan, I am prescribing controlled substances. The patient has been made aware of appropriate use of such medications, including potential risk of somnolence, limited ability to drive and/or work safely, and the potential for dependence or overdose. It has also been made clear that these medications are for use by this  patient only, without concomitant use of alcohol or other substances unless prescribed.     Patient has completed prescribing agreement detailing terms of continued prescribing of controlled substances, including monitoring SULEIMAN reports, urine drug screening, and pill counts if necessary. The patient is aware that inappropriate use will results in cessation of prescribing such medications.    SULEIMAN report has been reviewed and scanned into the patient's chart.    As the clinician, I personally reviewed the SULEIMAN from 4-23-20 while the patient was on the telephonic visit today.    History and physical exam exhibit continued safe and appropriate use of controlled substances.    -------    EMR Dragon/Transcription disclaimer:   Much of this encounter note is an electronic transcription/translation of spoken language to printed text. The electronic translation of spoken language may permit erroneous, or at times, nonsensical words or phrases to be inadvertently transcribed; Although I have reviewed the note for such errors, some may still exist.

## 2020-05-22 RX ORDER — HYDROCODONE BITARTRATE AND ACETAMINOPHEN 10; 325 MG/1; MG/1
1 TABLET ORAL EVERY 6 HOURS PRN
Qty: 120 TABLET | Refills: 0 | Status: SHIPPED | OUTPATIENT
Start: 2020-05-22 | End: 2020-06-24 | Stop reason: SDUPTHER

## 2020-05-22 NOTE — TELEPHONE ENCOUNTER
Medication Refill Request    Date of phone call: 20    Medication being requested: Norco  mg    si tab po q 6 hrs prn  Qty: 120    Date of last visit: 20    Date of last refill: 20    SULEIMAN up to date?: yes    Next Follow up?: 20    Any new pertinent information? (i.e, new medication allergies, new use of medications, change in patient's health or condition, non-compliance or inconsistency with prescribing agreement?):

## 2020-06-24 ENCOUNTER — OFFICE VISIT (OUTPATIENT)
Dept: PAIN MEDICINE | Facility: CLINIC | Age: 80
End: 2020-06-24

## 2020-06-24 ENCOUNTER — RESULTS ENCOUNTER (OUTPATIENT)
Dept: PAIN MEDICINE | Facility: CLINIC | Age: 80
End: 2020-06-24

## 2020-06-24 VITALS
DIASTOLIC BLOOD PRESSURE: 68 MMHG | TEMPERATURE: 97 F | SYSTOLIC BLOOD PRESSURE: 105 MMHG | HEART RATE: 103 BPM | BODY MASS INDEX: 18.66 KG/M2 | HEIGHT: 69 IN | OXYGEN SATURATION: 95 % | RESPIRATION RATE: 20 BRPM | WEIGHT: 126 LBS

## 2020-06-24 DIAGNOSIS — M51.36 DEGENERATION OF INTERVERTEBRAL DISC OF LUMBAR REGION: ICD-10-CM

## 2020-06-24 DIAGNOSIS — Z79.899 ENCOUNTER FOR LONG-TERM (CURRENT) USE OF HIGH-RISK MEDICATION: ICD-10-CM

## 2020-06-24 DIAGNOSIS — M47.812 OSTEOARTHRITIS OF CERVICAL SPINE, UNSPECIFIED SPINAL OSTEOARTHRITIS COMPLICATION STATUS: ICD-10-CM

## 2020-06-24 DIAGNOSIS — G89.4 CHRONIC PAIN SYNDROME: Primary | ICD-10-CM

## 2020-06-24 DIAGNOSIS — G89.4 CHRONIC PAIN SYNDROME: ICD-10-CM

## 2020-06-24 PROCEDURE — 80305 DRUG TEST PRSMV DIR OPT OBS: CPT | Performed by: NURSE PRACTITIONER

## 2020-06-24 PROCEDURE — 99214 OFFICE O/P EST MOD 30 MIN: CPT | Performed by: NURSE PRACTITIONER

## 2020-06-24 RX ORDER — TRIAMCINOLONE ACETONIDE 1 MG/G
CREAM TOPICAL
COMMUNITY
Start: 2020-06-18

## 2020-06-24 RX ORDER — TACROLIMUS 1 MG/G
OINTMENT TOPICAL
COMMUNITY
Start: 2020-06-18

## 2020-06-24 RX ORDER — TERBINAFINE HYDROCHLORIDE 250 MG/1
TABLET ORAL
COMMUNITY
Start: 2020-05-21

## 2020-06-24 RX ORDER — HYDROCODONE BITARTRATE AND ACETAMINOPHEN 10; 325 MG/1; MG/1
1 TABLET ORAL EVERY 6 HOURS PRN
Qty: 120 TABLET | Refills: 0 | Status: SHIPPED | OUTPATIENT
Start: 2020-06-24 | End: 2020-07-22 | Stop reason: SDUPTHER

## 2020-06-24 NOTE — PROGRESS NOTES
CHIEF COMPLAINT  F/u back and neck pain. Pt sts pain has remained the same since last ov. Pt sts pain is based on activity. Pt sts increased neck pain while sleeping.     Subjective   Tanya Jimenes is a 80 y.o. male  who presents for follow-up.  He has a history of chronic back and neck pain. Reports his pain pattern is relatively unchanged since last evaluation.    Complains of pain in his neck and back. Today his pain is 4/10VAS. Describes his pain as intermittent throbbing and sharp pain. Pain increases with activity, household chores, prolonged position; pain decreases with rest, medication. Pain can interfere with his sleep at night. Continues with Hydrocodone 7.5/325 4/day and gabapentin 800 mg 2/day. Denies any side effects from the regimen, including constipation and somnolence. The regimen helps decrease his pain by 50%. ADL's by self.  Denies any bowel or bladder changes. Does not take Klonopin at same time as Hydrocodone.      Cannot take NSAIDs due to CKD.  Does not want to repeat injections. Reports he has issues with his tongue afterwards.    Patient remained masked during entire encounter. No cough present. I donned a mask and gloves throughout entire visit. Prior to donning mask and gloves, hand hygiene was performed, as well as when it was doffed.  I was closer than 6 feet, but not for an extended period of time. No obvious exposure to any bodily fluids.    Neck Pain    This is a chronic problem. The current episode started more than 1 year ago. The problem occurs constantly. Progression since onset: unchanged from last office visit. The pain is at a severity of 4/10. The pain is moderate. The symptoms are aggravated by position and bending. Associated symptoms include weakness (gen). Pertinent negatives include no chest pain, fever, headaches, numbness or visual change. He has tried neck support and oral narcotics (gabapentin, Hydrocodone) for the symptoms. The treatment provided moderate relief.    Back Pain   This is a chronic problem. The current episode started more than 1 year ago. The problem occurs constantly. Progression since onset: unchanged from last office visit. The pain is at a severity of 4/10. The pain is moderate. Associated symptoms include weakness (gen). Pertinent negatives include no abdominal pain, bladder incontinence, bowel incontinence, chest pain, dysuria, fever, headaches or numbness. He has tried analgesics (gabapentin, Hydrocodone) for the symptoms. The treatment provided moderate relief.      PHQ-9 Depression Screening  Little interest or pleasure in doing things? 0   Feeling down, depressed, or hopeless? 1   Trouble falling or staying asleep, or sleeping too much?     Feeling tired or having little energy?     Poor appetite or overeating?     Feeling bad about yourself - or that you are a failure or have let yourself or your family down?     Trouble concentrating on things, such as reading the newspaper or watching television?     Moving or speaking so slowly that other people could have noticed? Or the opposite - being so fidgety or restless that you have been moving around a lot more than usual?     Thoughts that you would be better off dead, or of hurting yourself in some way?     PHQ-9 Total Score 1   If you checked off any problems, how difficult have these problems made it for you to do your work, take care of things at home, or get along with other people?         PEG Assessment   What number best describes your pain on average in the past week?5  What number best describes how, during the past week, pain has interfered with your enjoyment of life?5  What number best describes how, during the past week, pain has interfered with your general activity?  5    The following portions of the patient's history were reviewed and updated as appropriate: allergies, current medications, past family history, past medical history, past social history, past surgical history and problem  "list.    Review of Systems   Constitutional: Negative for activity change, fatigue and fever.   HENT: Negative for congestion.    Eyes: Negative for visual disturbance.   Respiratory: Negative for chest tightness and shortness of breath.    Cardiovascular: Negative for chest pain.   Gastrointestinal: Negative for abdominal pain, bowel incontinence, constipation and diarrhea.   Genitourinary: Negative for bladder incontinence, difficulty urinating and dysuria.   Musculoskeletal: Positive for back pain, neck pain and neck stiffness. Negative for arthralgias and gait problem.   Allergic/Immunologic: Negative for immunocompromised state.   Neurological: Positive for weakness (gen). Negative for dizziness, light-headedness, numbness and headaches.   Psychiatric/Behavioral: Positive for sleep disturbance. Negative for agitation and suicidal ideas. The patient is nervous/anxious.        Vitals:    06/24/20 1453   BP: 105/68   Pulse: 103   Resp: 20   Temp: 97 °F (36.1 °C)   SpO2: 95%   Weight: 57.2 kg (126 lb)   Height: 175.3 cm (69\")   PainSc:   4   PainLoc: Back     Objective   Physical Exam   Constitutional: He is oriented to person, place, and time. Vital signs are normal. He appears well-developed and well-nourished. He is cooperative.   HENT:   Head: Normocephalic and atraumatic.   Nose: Nose normal.   Eyes: Conjunctivae and lids are normal.   Neck: Trachea normal. Neck supple. Decreased range of motion present.   Cardiovascular: Normal rate.   Pulmonary/Chest: Effort normal.   Abdominal: Normal appearance.   Musculoskeletal:        Cervical back: He exhibits decreased range of motion and tenderness.        Lumbar back: He exhibits decreased range of motion and tenderness.   Neurological: He is alert and oriented to person, place, and time. Gait normal.   Skin: Skin is warm, dry and intact.   Psychiatric: He has a normal mood and affect. His speech is normal and behavior is normal. Judgment and thought content normal. " Cognition and memory are normal.   Nursing note and vitals reviewed.      Assessment/Plan   Tanya was seen today for back pain.    Diagnoses and all orders for this visit:    Chronic pain syndrome    Osteoarthritis of cervical spine, unspecified spinal osteoarthritis complication status    Degeneration of intervertebral disc of lumbar region    Encounter for long-term (current) use of high-risk medication    Other orders  -     HYDROcodone-acetaminophen (NORCO)  MG per tablet; Take 1 tablet by mouth Every 6 (Six) Hours As Needed for Moderate Pain .      --- Routine UDS in office today as part of monitoring requirements for controlled substances.  The specimen was viewed and the immunoassay result reviewed and is +OPI.  This specimen will be sent to [facility] for confirmation.     --- Refill Hydrocodone. Patient appears stable with current regimen. No adverse effects. Regarding continuation of opioids, there is no evidence of aberrant behavior or any red flags.  The patient continues with appropriate response to opioid therapy. ADL's remain intact by self.   --- .Discussed with the patient regarding long-term side effects of opioids including but not limited to dependence, addiction, sedation, respiratory depression, opioid induced hormonal suppression, hyperalgesia, and elevated risk of myocardial infarction.  --- Follow-up 2 months or sooner if needed       SULEIMAN REPORT    As part of the patient's treatment plan, I am prescribing controlled substances. The patient has been made aware of appropriate use of such medications, including potential risk of somnolence, limited ability to drive and/or work safely, and the potential for dependence or overdose. It has also bee made clear that these medications are for use by this patient only, without concomitant use of alcohol or other substances unless prescribed.     Patient has completed prescribing agreement detailing terms of continued prescribing  of controlled substances, including monitoring SULEIMAN reports, urine drug screening, and pill counts if necessary. The patient is aware that inappropriate use will results in cessation of prescribing such medications.    SULEIMAN report has been reviewed and scanned into the patient's chart.    As the clinician, I personally reviewed the SULEIMAN from 6-22-20 .    History and physical exam exhibit continued safe and appropriate use of controlled substances.      EMR Dragon/Transcription disclaimer:   Much of this encounter note is an electronic transcription/translation of spoken language to printed text. The electronic translation of spoken language may permit erroneous, or at times, nonsensical words or phrases to be inadvertently transcribed; Although I have reviewed the note for such errors, some may still exist.

## 2020-07-22 RX ORDER — HYDROCODONE BITARTRATE AND ACETAMINOPHEN 10; 325 MG/1; MG/1
1 TABLET ORAL EVERY 6 HOURS PRN
Qty: 120 TABLET | Refills: 0 | Status: SHIPPED | OUTPATIENT
Start: 2020-07-22 | End: 2020-08-24 | Stop reason: SDUPTHER

## 2020-07-22 NOTE — TELEPHONE ENCOUNTER
Medication Refill Request    Date of phone call: 2020    Medication being requested: hydro/apap  mg si tab q 6 hrs prn  Qty: 120    Date of last visit: 2020    Date of last refill: 2020    SULEIMAN up to date?: yes    Next Follow up?: 2020    Any new pertinent information? (i.e, new medication allergies, new use of medications, change in patient's health or condition, non-compliance or inconsistency with prescribing agreement?):

## 2020-08-24 ENCOUNTER — OFFICE VISIT (OUTPATIENT)
Dept: PAIN MEDICINE | Facility: CLINIC | Age: 80
End: 2020-08-24

## 2020-08-24 VITALS
OXYGEN SATURATION: 97 % | SYSTOLIC BLOOD PRESSURE: 103 MMHG | BODY MASS INDEX: 17.83 KG/M2 | WEIGHT: 120.4 LBS | TEMPERATURE: 97.3 F | DIASTOLIC BLOOD PRESSURE: 64 MMHG | HEART RATE: 90 BPM | HEIGHT: 69 IN | RESPIRATION RATE: 20 BRPM

## 2020-08-24 DIAGNOSIS — Z79.899 ENCOUNTER FOR LONG-TERM (CURRENT) USE OF HIGH-RISK MEDICATION: ICD-10-CM

## 2020-08-24 DIAGNOSIS — M51.36 DEGENERATION OF INTERVERTEBRAL DISC OF LUMBAR REGION: ICD-10-CM

## 2020-08-24 DIAGNOSIS — M47.812 OSTEOARTHRITIS OF CERVICAL SPINE, UNSPECIFIED SPINAL OSTEOARTHRITIS COMPLICATION STATUS: ICD-10-CM

## 2020-08-24 DIAGNOSIS — G89.4 CHRONIC PAIN SYNDROME: Primary | ICD-10-CM

## 2020-08-24 PROCEDURE — 99214 OFFICE O/P EST MOD 30 MIN: CPT | Performed by: NURSE PRACTITIONER

## 2020-08-24 RX ORDER — HYDROCODONE BITARTRATE AND ACETAMINOPHEN 10; 325 MG/1; MG/1
1 TABLET ORAL EVERY 6 HOURS PRN
Qty: 120 TABLET | Refills: 0 | Status: SHIPPED | OUTPATIENT
Start: 2020-08-24 | End: 2020-09-21 | Stop reason: SDUPTHER

## 2020-08-24 NOTE — PROGRESS NOTES
CHIEF COMPLAINT  F/u back and neck pain. Pt sts pain has remained the same since last ov. Pt sts pain is based on activity.     Subjective   Tanya Jimenes is a 80 y.o. male  who presents for follow-up.  He has a history of chronic back and neck pain. Reports the pain pattern is UNCHANGED since last evaluation.    Reports her had to have abdominal surgery recently.  On 8-6-20, he went  To bed and having severe pain in side.  Was taken to [facility]. He said part of his colon had a hole in it and had a foot of colon removed. Reports he was in the hospital for approximately 5-6 days.  Also had home health after discharge.    Complains of pain in his neck and low back. Today his pain is 5/10VAS.  Describes his pain as continuous aching and throbbing. He is also having pain in his abdomen post-operatively. Is supposed to have staples removed tomorrow. Continues with Hydrocodone 7.5/325 4/day and gabapentin 800 mg 2/day. Denies any side effects from the regimen, including constipation and somnolence. The regimen helps decrease his pain by 40-50%. ADL's by self.  Denies any bowel or bladder changes. Does not take Klonopin at same time as Hydrocodone.     Cannot take NSAIDs due to CKD.  Does not want to repeat injections. Reports he has issues with his tongue afterwards.     Patient remained masked during entire encounter. No cough present. I donned a mask and gloves throughout entire visit. Prior to donning mask and gloves, hand hygiene was performed, as well as when it was doffed.  I was closer than 6 feet, but not for an extended period of time. No obvious exposure to any bodily fluids.  Neck Pain    This is a chronic problem. The current episode started more than 1 year ago. The problem occurs constantly. Progression since onset: unchanged from last evaluation. The pain is at a severity of 5/10. The pain is moderate. The symptoms are aggravated by position and bending. Associated symptoms include weakness (gen).  Pertinent negatives include no chest pain, fever, headaches, numbness or visual change. He has tried neck support and oral narcotics (gabapentin, Hydrocodone) for the symptoms. The treatment provided moderate relief.   Back Pain   This is a chronic problem. The current episode started more than 1 year ago. The problem occurs constantly. Progression since onset: unchanged from last evaluation. The pain is at a severity of 5/10. The pain is moderate. Associated symptoms include abdominal pain (recent surgery) and weakness (gen). Pertinent negatives include no bladder incontinence, bowel incontinence, chest pain, dysuria, fever, headaches or numbness. He has tried analgesics (gabapentin, Hydrocodone) for the symptoms. The treatment provided moderate relief.      PEG Assessment   What number best describes your pain on average in the past week?6  What number best describes how, during the past week, pain has interfered with your enjoyment of life?6  What number best describes how, during the past week, pain has interfered with your general activity?  6    The following portions of the patient's history were reviewed and updated as appropriate: allergies, current medications, past family history, past medical history, past social history, past surgical history and problem list.    Review of Systems   Constitutional: Positive for activity change (dec) and fatigue. Negative for fever.   HENT: Negative for congestion.    Eyes: Negative for visual disturbance.   Respiratory: Negative for chest tightness and shortness of breath.    Cardiovascular: Negative for chest pain.   Gastrointestinal: Positive for abdominal pain (recent surgery). Negative for bowel incontinence, constipation and diarrhea.   Genitourinary: Negative for bladder incontinence, difficulty urinating and dysuria.   Musculoskeletal: Positive for back pain, neck pain and neck stiffness. Negative for arthralgias and gait problem.   Allergic/Immunologic: Negative  "for immunocompromised state.   Neurological: Positive for weakness (gen). Negative for dizziness, light-headedness, numbness and headaches.   Psychiatric/Behavioral: Positive for sleep disturbance. Negative for agitation and suicidal ideas. The patient is nervous/anxious.      I have reviewed and confirmed the accuracy of the ROS as documented by the MA/LPN/RN TAYLER Dimas      Vitals:    08/24/20 1353   BP: 103/64   Pulse: 90   Resp: 20   Temp: 97.3 °F (36.3 °C)   SpO2: 97%   Weight: 54.6 kg (120 lb 6.4 oz)   Height: 175.3 cm (69\")   PainSc:   5   PainLoc: Back  Comment: and neck     Objective   Physical Exam   Constitutional: He is oriented to person, place, and time. Vital signs are normal. He appears well-developed and well-nourished. He is cooperative.   HENT:   Head: Normocephalic and atraumatic.   Nose: Nose normal.   Eyes: Conjunctivae and lids are normal.   Neck: Trachea normal. Neck supple. Decreased range of motion present.   Cardiovascular: Normal rate.   Pulmonary/Chest: Effort normal.   Abdominal: Normal appearance.       Musculoskeletal:        Cervical back: He exhibits decreased range of motion and tenderness.        Lumbar back: He exhibits decreased range of motion and tenderness.   Neurological: He is alert and oriented to person, place, and time. Gait normal.   Skin: Skin is warm, dry and intact.   Psychiatric: He has a normal mood and affect. His speech is normal and behavior is normal. Judgment and thought content normal. Cognition and memory are normal.   Nursing note and vitals reviewed.      Assessment/Plan   Tanya was seen today for back pain and neck pain.    Diagnoses and all orders for this visit:    Chronic pain syndrome    Degeneration of intervertebral disc of lumbar region    Osteoarthritis of cervical spine, unspecified spinal osteoarthritis complication status    Encounter for long-term (current) use of high-risk medication    Other orders  -     " HYDROcodone-acetaminophen (NORCO)  MG per tablet; Take 1 tablet by mouth Every 6 (Six) Hours As Needed for Moderate Pain .      --- The urine drug screen confirmation from 6-24-20 has been reviewed and the result is APPROPRIATE based on patient history and SULEIMAN report  --- Refill Hydrocodone. Patient appears stable with current regimen. No adverse effects. Regarding continuation of opioids, there is no evidence of aberrant behavior or any red flags.  The patient continues with appropriate response to opioid therapy. ADL's remain intact by self.   --- Follow-up 2 months or sooner if needed.       SULEIMAN REPORT  As part of the patient's treatment plan, I am prescribing controlled substances. The patient has been made aware of appropriate use of such medications, including potential risk of somnolence, limited ability to drive and/or work safely, and the potential for dependence or overdose. It has also bee made clear that these medications are for use by this patient only, without concomitant use of alcohol or other substances unless prescribed.     Patient has completed prescribing agreement detailing terms of continued prescribing of controlled substances, including monitoring SULEIMAN reports, urine drug screening, and pill counts if necessary. The patient is aware that inappropriate use will results in cessation of prescribing such medications.    SULEIMAN report has been reviewed and scanned into the patient's chart.    As the clinician, I personally reviewed the SULEIMAN from 8-21-20 while the patient was in the office today.    History and physical exam exhibit continued safe and appropriate use of controlled substances.        EMR Dragon/Transcription disclaimer:   Much of this encounter note is an electronic transcription/translation of spoken language to printed text. The electronic translation of spoken language may permit erroneous, or at times, nonsensical words or phrases to be inadvertently  transcribed; Although I have reviewed the note for such errors, some may still exist.

## 2020-08-28 RX ORDER — GABAPENTIN 800 MG/1
800 TABLET ORAL 2 TIMES DAILY
Qty: 180 TABLET | Refills: 1 | Status: SHIPPED | OUTPATIENT
Start: 2020-08-28 | End: 2021-02-23

## 2020-08-28 NOTE — TELEPHONE ENCOUNTER
Medication Refill Request    Date of phone call: 20    Medication being requested: Gabapentin 800 mg   si tab po BID  Qty: 180    Date of last visit: 20    Date of last refill: 20    SULEIMAN up to date?: yes    Next Follow up?: 10/22/20    Any new pertinent information? (i.e, new medication allergies, new use of medications, change in patient's health or condition, non-compliance or inconsistency with prescribing agreement?): Can you refill for Elizabeth

## 2020-09-21 RX ORDER — HYDROCODONE BITARTRATE AND ACETAMINOPHEN 10; 325 MG/1; MG/1
1 TABLET ORAL EVERY 6 HOURS PRN
Qty: 120 TABLET | Refills: 0 | Status: SHIPPED | OUTPATIENT
Start: 2020-09-21 | End: 2020-10-22 | Stop reason: SDUPTHER

## 2020-09-21 NOTE — TELEPHONE ENCOUNTER
Medication Refill Request    Date of phone call: 20    Medication being requested: Norco  mg   si tab po q 6 hrs prn  Qty: 120    Date of last visit: 20    Date of last refill: 20    SULEIMAN up to date?: yes    Next Follow up?: 10/22/20    Any new pertinent information? (i.e, new medication allergies, new use of medications, change in patient's health or condition, non-compliance or inconsistency with prescribing agreement?):

## 2020-10-22 ENCOUNTER — OFFICE VISIT (OUTPATIENT)
Dept: PAIN MEDICINE | Facility: CLINIC | Age: 80
End: 2020-10-22

## 2020-10-22 VITALS
WEIGHT: 123 LBS | RESPIRATION RATE: 20 BRPM | OXYGEN SATURATION: 98 % | BODY MASS INDEX: 18.22 KG/M2 | SYSTOLIC BLOOD PRESSURE: 112 MMHG | TEMPERATURE: 97.1 F | HEART RATE: 94 BPM | HEIGHT: 69 IN | DIASTOLIC BLOOD PRESSURE: 69 MMHG

## 2020-10-22 DIAGNOSIS — G89.4 CHRONIC PAIN SYNDROME: Primary | ICD-10-CM

## 2020-10-22 DIAGNOSIS — M51.36 DEGENERATION OF INTERVERTEBRAL DISC OF LUMBAR REGION: ICD-10-CM

## 2020-10-22 DIAGNOSIS — M47.812 OSTEOARTHRITIS OF CERVICAL SPINE, UNSPECIFIED SPINAL OSTEOARTHRITIS COMPLICATION STATUS: ICD-10-CM

## 2020-10-22 DIAGNOSIS — Z79.899 ENCOUNTER FOR LONG-TERM (CURRENT) USE OF HIGH-RISK MEDICATION: ICD-10-CM

## 2020-10-22 PROCEDURE — 99214 OFFICE O/P EST MOD 30 MIN: CPT | Performed by: NURSE PRACTITIONER

## 2020-10-22 RX ORDER — HYDROCODONE BITARTRATE AND ACETAMINOPHEN 10; 325 MG/1; MG/1
1 TABLET ORAL EVERY 6 HOURS PRN
Qty: 120 TABLET | Refills: 0 | Status: SHIPPED | OUTPATIENT
Start: 2020-10-22 | End: 2020-11-19 | Stop reason: SDUPTHER

## 2020-10-22 RX ORDER — OXYCODONE HYDROCHLORIDE 5 MG/1
TABLET ORAL
COMMUNITY
Start: 2020-08-14 | End: 2021-02-18

## 2020-10-22 NOTE — PROGRESS NOTES
"CHIEF COMPLAINT  F/u back and neck pain. Pt sts pain has worsened due to the cold weather recently.     Subjective   Tanya Jimenes is a 80 y.o. male  who presents for follow-up.  He has a history of chronic back and neck pain. Reports his pain is worse lately due to colder weather.    Complains of pain in his neck and low back. Today his pain is 5/10VAS. Describes the pain as continuous throbbing with intermittent sharp pain. Pain increases with cold/wet weather, stress, over-activity, household chores; pain decreases with medication and rest.  Continues with Hydrocodone 7.5/325 4/day and gabapentin 800 mg 2/day. Denies any side effects from the regimen.  The regimen helps decrease his pain by 50%. \"it brings it down to where I can function.\"  ADL's by self.  Denies any bowel or bladder changes. Does not take Klonopin at same time as Hydrocodone.     Cannot take NSAIDs due to CKD.  Does not want to repeat injections. Reports he has issues with his tongue afterwards.    Reports her had to have abdominal surgery recently.  On 8-6-20, he went  To bed and having severe pain in side.  Was taken to [facility]. He said part of his colon had a hole in it and had a foot of colon removed. Reports he was in the hospital for approximately 5-6 days.  Also had home health after discharge. Diagnosis of diverticulitis.    Reports he has been having a \"crick\" in his knee when going up stairs.     Patient remained masked during entire encounter. No cough present. I donned a mask and eye protection throughout entire visit. Prior to donning mask and eye protection, hand hygiene was performed, as well as when it was doffed.  I was closer than 6 feet, but not for an extended period of time. No obvious exposure to any bodily fluids.    Neck Pain   This is a chronic problem. The current episode started more than 1 year ago. The problem occurs constantly. Progression since onset: worse from last evaluation-- due to colder weather. The pain " is at a severity of 5/10. The pain is moderate. The symptoms are aggravated by position and bending. Associated symptoms include weakness (gen). Pertinent negatives include no chest pain, fever, headaches, numbness or visual change. He has tried neck support and oral narcotics (gabapentin, Hydrocodone) for the symptoms. The treatment provided moderate relief.   Back Pain  This is a chronic problem. The current episode started more than 1 year ago. The problem occurs constantly. Progression since onset: worse from last evaluation-- due to colder weather. The pain is at a severity of 5/10. The pain is moderate. Associated symptoms include abdominal pain (recent surgery) and weakness (gen). Pertinent negatives include no bladder incontinence, bowel incontinence, chest pain, dysuria, fever, headaches or numbness. He has tried analgesics (gabapentin, Hydrocodone) for the symptoms. The treatment provided moderate relief.      PEG Assessment   What number best describes your pain on average in the past week?5  What number best describes how, during the past week, pain has interfered with your enjoyment of life?5  What number best describes how, during the past week, pain has interfered with your general activity?  5    The following portions of the patient's history were reviewed and updated as appropriate: allergies, current medications, past family history, past medical history, past social history, past surgical history and problem list.    Review of Systems   Constitutional: Negative for activity change, fatigue and fever.   HENT: Negative for congestion.    Eyes: Negative for visual disturbance.   Respiratory: Negative for cough and shortness of breath.    Cardiovascular: Negative for chest pain.   Gastrointestinal: Positive for abdominal pain (recent surgery). Negative for bowel incontinence, constipation and diarrhea.   Genitourinary: Negative for bladder incontinence, difficulty urinating and dysuria.  "  Musculoskeletal: Positive for back pain and neck pain.   Allergic/Immunologic: Negative for immunocompromised state.   Neurological: Positive for weakness (gen). Negative for dizziness, light-headedness, numbness and headaches.   Psychiatric/Behavioral: Negative for agitation, sleep disturbance and suicidal ideas. The patient is not nervous/anxious.      I have reviewed and confirmed the accuracy of the ROS as documented by the MA/LPN/RN TAYLER Dimas      Vitals:    10/22/20 1355   BP: 112/69   Pulse: 94   Resp: 20   Temp: 97.1 °F (36.2 °C)   SpO2: 98%   Weight: 55.8 kg (123 lb)   Height: 175.3 cm (69\")   PainSc:   5   PainLoc: Back     Objective   Physical Exam  Vitals signs and nursing note reviewed.   Constitutional:       Appearance: Normal appearance. He is well-developed.   HENT:      Head: Normocephalic and atraumatic.      Nose: Nose normal.   Eyes:      General: Lids are normal.      Conjunctiva/sclera: Conjunctivae normal.   Neck:      Musculoskeletal: Decreased range of motion. Spinous process tenderness and muscular tenderness present.   Cardiovascular:      Rate and Rhythm: Normal rate.   Pulmonary:      Effort: Pulmonary effort is normal. No respiratory distress.   Musculoskeletal:      Lumbar back: He exhibits decreased range of motion and tenderness.   Skin:     General: Skin is warm and dry.   Neurological:      Mental Status: He is alert and oriented to person, place, and time.      Gait: Gait normal.   Psychiatric:         Speech: Speech normal.         Behavior: Behavior normal. Behavior is cooperative.         Thought Content: Thought content normal.         Judgment: Judgment normal.         Assessment/Plan   Diagnoses and all orders for this visit:    1. Chronic pain syndrome (Primary)    2. Osteoarthritis of cervical spine, unspecified spinal osteoarthritis complication status    3. Degeneration of intervertebral disc of lumbar region    4. Encounter for long-term (current) use " of high-risk medication    Other orders  -     HYDROcodone-acetaminophen (NORCO)  MG per tablet; Take 1 tablet by mouth Every 6 (Six) Hours As Needed for Moderate Pain .  Dispense: 120 tablet; Refill: 0      --- The urine drug screen confirmation from 6-24-20 has been reviewed and the result is APPROPRIATE based on patient history and SULEIMAN report  --- Refill Hydrocodone. Patient appears stable with current regimen. No adverse effects. Regarding continuation of opioids, there is no evidence of aberrant behavior or any red flags.  The patient continues with appropriate response to opioid therapy. ADL's remain intact by self.   --- Follow-up 2 months or sooner if needed.       SULEIMAN REPORT  As part of the patient's treatment plan, I am prescribing controlled substances. The patient has been made aware of appropriate use of such medications, including potential risk of somnolence, limited ability to drive and/or work safely, and the potential for dependence or overdose. It has also bee made clear that these medications are for use by this patient only, without concomitant use of alcohol or other substances unless prescribed.     Patient has completed prescribing agreement detailing terms of continued prescribing of controlled substances, including monitoring SULEIMAN reports, urine drug screening, and pill counts if necessary. The patient is aware that inappropriate use will results in cessation of prescribing such medications.    SULEIMAN report has been reviewed and scanned into the patient's chart.    As the clinician, I personally reviewed the SULEIMAN from 10-22-20 while the patient was in the office today.    History and physical exam exhibit continued safe and appropriate use of controlled substances.        EMR Dragon/Transcription disclaimer:   Much of this encounter note is an electronic transcription/translation of spoken language to printed text. The electronic translation of spoken language may permit  erroneous, or at times, nonsensical words or phrases to be inadvertently transcribed; Although I have reviewed the note for such errors, some may still exist.

## 2020-11-19 RX ORDER — HYDROCODONE BITARTRATE AND ACETAMINOPHEN 10; 325 MG/1; MG/1
1 TABLET ORAL EVERY 6 HOURS PRN
Qty: 120 TABLET | Refills: 0 | Status: SHIPPED | OUTPATIENT
Start: 2020-11-19 | End: 2020-12-21 | Stop reason: SDUPTHER

## 2020-11-19 NOTE — TELEPHONE ENCOUNTER
Medication Refill Request    Date of phone call: 2020    Medication being requested: hydro/apap  mg si tab q 6 hrs  Qty: 120    Date of last visit: 10/22/2020    Date of last refill: 10/22/2020    SULEIMAN up to date?: yes    Next Follow up?: 2020    Any new pertinent information? (i.e, new medication allergies, new use of medications, change in patient's health or condition, non-compliance or inconsistency with prescribing agreement?):     Reviewed UDS and SULEIMAN. Both updated and appropriate. Refill appropriate.

## 2020-12-21 ENCOUNTER — RESULTS ENCOUNTER (OUTPATIENT)
Dept: PAIN MEDICINE | Facility: CLINIC | Age: 80
End: 2020-12-21

## 2020-12-21 ENCOUNTER — OFFICE VISIT (OUTPATIENT)
Dept: PAIN MEDICINE | Facility: CLINIC | Age: 80
End: 2020-12-21

## 2020-12-21 VITALS
TEMPERATURE: 97.1 F | DIASTOLIC BLOOD PRESSURE: 70 MMHG | WEIGHT: 113 LBS | SYSTOLIC BLOOD PRESSURE: 118 MMHG | BODY MASS INDEX: 16.74 KG/M2 | OXYGEN SATURATION: 97 % | HEIGHT: 69 IN | RESPIRATION RATE: 20 BRPM | HEART RATE: 79 BPM

## 2020-12-21 DIAGNOSIS — Z79.899 ENCOUNTER FOR LONG-TERM (CURRENT) USE OF HIGH-RISK MEDICATION: ICD-10-CM

## 2020-12-21 DIAGNOSIS — M47.812 OSTEOARTHRITIS OF CERVICAL SPINE, UNSPECIFIED SPINAL OSTEOARTHRITIS COMPLICATION STATUS: ICD-10-CM

## 2020-12-21 DIAGNOSIS — G89.4 CHRONIC PAIN SYNDROME: ICD-10-CM

## 2020-12-21 DIAGNOSIS — M51.36 DEGENERATION OF INTERVERTEBRAL DISC OF LUMBAR REGION: ICD-10-CM

## 2020-12-21 DIAGNOSIS — G89.4 CHRONIC PAIN SYNDROME: Primary | ICD-10-CM

## 2020-12-21 PROCEDURE — 80305 DRUG TEST PRSMV DIR OPT OBS: CPT | Performed by: NURSE PRACTITIONER

## 2020-12-21 PROCEDURE — 99214 OFFICE O/P EST MOD 30 MIN: CPT | Performed by: NURSE PRACTITIONER

## 2020-12-21 RX ORDER — HYDROCODONE BITARTRATE AND ACETAMINOPHEN 10; 325 MG/1; MG/1
1 TABLET ORAL EVERY 6 HOURS PRN
Qty: 120 TABLET | Refills: 0 | Status: SHIPPED | OUTPATIENT
Start: 2020-12-21 | End: 2021-01-15 | Stop reason: SDUPTHER

## 2020-12-21 RX ORDER — MELATONIN 200 MCG
3 TABLET ORAL NIGHTLY
COMMUNITY

## 2020-12-21 RX ORDER — CEFDINIR 300 MG/1
CAPSULE ORAL
COMMUNITY
Start: 2020-12-14 | End: 2021-08-16

## 2020-12-21 RX ORDER — CIPROFLOXACIN 250 MG/1
TABLET, FILM COATED ORAL
COMMUNITY
Start: 2020-11-10 | End: 2021-02-18

## 2021-01-15 NOTE — TELEPHONE ENCOUNTER
Medication Refill Request    Date of phone call: 1/15/2021    Medication being requested:  mg sig: take 1 tab q 6 hrs prn  Qty: 120    Date of last visit: 12/21/2020    Date of last refill: 12/21/2020    SULEIMAN up to date?: yes    Next Follow up?: 2/18/2021    Any new pertinent information? (i.e, new medication allergies, new use of medications, change in patient's health or condition, non-compliance or inconsistency with prescribing agreement?):

## 2021-01-18 RX ORDER — HYDROCODONE BITARTRATE AND ACETAMINOPHEN 10; 325 MG/1; MG/1
1 TABLET ORAL EVERY 6 HOURS PRN
Qty: 120 TABLET | Refills: 0 | Status: SHIPPED | OUTPATIENT
Start: 2021-01-18 | End: 2021-02-18 | Stop reason: SDUPTHER

## 2021-02-18 ENCOUNTER — OFFICE VISIT (OUTPATIENT)
Dept: PAIN MEDICINE | Facility: CLINIC | Age: 81
End: 2021-02-18

## 2021-02-18 DIAGNOSIS — G89.4 CHRONIC PAIN SYNDROME: Primary | ICD-10-CM

## 2021-02-18 DIAGNOSIS — M47.812 OSTEOARTHRITIS OF CERVICAL SPINE, UNSPECIFIED SPINAL OSTEOARTHRITIS COMPLICATION STATUS: ICD-10-CM

## 2021-02-18 DIAGNOSIS — Z79.899 ENCOUNTER FOR LONG-TERM (CURRENT) USE OF HIGH-RISK MEDICATION: ICD-10-CM

## 2021-02-18 DIAGNOSIS — M51.36 DEGENERATION OF INTERVERTEBRAL DISC OF LUMBAR REGION: ICD-10-CM

## 2021-02-18 PROCEDURE — 99441 PR PHYS/QHP TELEPHONE EVALUATION 5-10 MIN: CPT | Performed by: NURSE PRACTITIONER

## 2021-02-18 RX ORDER — HYDROCODONE BITARTRATE AND ACETAMINOPHEN 10; 325 MG/1; MG/1
1 TABLET ORAL EVERY 6 HOURS PRN
Qty: 120 TABLET | Refills: 0 | Status: SHIPPED | OUTPATIENT
Start: 2021-02-18 | End: 2021-03-18 | Stop reason: SDUPTHER

## 2021-02-18 NOTE — PROGRESS NOTES
TELEPHONE VISIT    You have chosen to receive care through a telephone visit. Do you consent to use a telephone visit for your medical care today? Yes    CHIEF COMPLAINT  Back and neck pain  Requested telehealth due to COVID Pandemic and winter weather  Subjective   Tanya Jimenes is a 80 y.o. male  who presents for a telephonic follow-up.He has a history of chronic back and neck pain. Reports his pain is UNCHANGED since last evaluation.    Complains of pain in his neck and low back. Today his pain is 3/10VAS. Describes the pain as continuous throbbing. Pain increases with activity, household chores; pain decreases with medication, rest. Continues with Hydrocodone 7.5/325 4/day and gabapentin 800 mg 2/day. Denies any side effects from the regimen, including constipation and somnolence.  The regimen helps decrease his pain by 50%. Notes improvement in activity and function with medication.  ADL's by self.  Denies any bowel or bladder changes. Does not take Klonopin at same time as Hydrocodone.     Cannot take NSAIDs due to CKD.  Does not want to repeat injections. Reports he has issues with his tongue afterwards.    Neck Pain   This is a chronic problem. The current episode started more than 1 year ago. The problem occurs constantly. The pain is moderate. The symptoms are aggravated by position and bending. Pertinent negatives include no chest pain, fever, headaches, numbness, visual change or weakness. He has tried neck support and oral narcotics (gabapentin, Hydrocodone) for the symptoms. The treatment provided moderate relief.   Back Pain  This is a chronic problem. The current episode started more than 1 year ago. The problem occurs constantly. The pain is moderate. Associated symptoms include abdominal pain. Pertinent negatives include no bladder incontinence, bowel incontinence, chest pain, dysuria, fever, headaches, numbness or weakness. He has tried analgesics (gabapentin, Hydrocodone) for the symptoms. The  treatment provided moderate relief.      The following portions of the patient's history were reviewed and updated as appropriate: allergies, current medications, past family history, past medical history, past social history, past surgical history and problem list.    Review of Systems   Constitutional: Negative for fever.   Cardiovascular: Negative for chest pain.   Gastrointestinal: Positive for abdominal pain. Negative for bowel incontinence.   Genitourinary: Negative for bladder incontinence and dysuria.   Musculoskeletal: Positive for back pain and neck pain.   Neurological: Negative for weakness, numbness and headaches.       There were no vitals filed for this visit.    Objective   Physical Exam  As this is a telephone check-in, the ability to perform a routine physical exam is extremely limited. The patient seems alert and is oriented appropriately.   On this phone call there is not any evidence of respiratory distress.   The patient seems of normal mood.   The remainder of a routine physical exam is deferred.      Assessment/Plan   Diagnoses and all orders for this visit:    1. Chronic pain syndrome (Primary)    2. Osteoarthritis of cervical spine, unspecified spinal osteoarthritis complication status    3. Degeneration of intervertebral disc of lumbar region    4. Encounter for long-term (current) use of high-risk medication      ----------------  Our practice is offering alternative &/or electronic methods to continue to follow our patients while at the same time further the efforts toward social distancing, in accordance with our organizational policies, professional societies' guidance, and gubernatorial mandates.  I support the Healthy at Home campaign and in this visit I have counseled the patient on our needs to limit in-person office visits and physical encounters with medical facilities whenever possible.  I have also educated the patient on the medical necessities of maintaining social distancing  while we continue to function during this crisis period.      The patient was counseled on the need to consider telehealth options. The patient had obstacles which preclude consideration for a Video Visit. As a Video Visit was not practical, the patient was offered the option for a Telephone Visit. The patient agreed to a Telephone Encounter. The patient was counseled on the need for a telehealth visit for necessary monitoring. The patient was educated about our efforts to comply with monitoring standards when prescribing potent medications.    TIME:  Total Time:  8 minutes. Topics discussed are outlined in the Assessment/Plan section of the note.    ----------------    --- The urine drug screen confirmation from 12-21-20 has been reviewed and the result is APPROPRIATE based on patient history and SULEIMAN report  --- The patient signed an updated copy of the controlled substance agreement on 12-21-20  --- reviewed black box warning of Klonopin and Hydrocodone and warned not to take together.  --- Refill Hydrocodone. Patient appears stable with current regimen. No adverse effects. Regarding continuation of opioids, there is no evidence of aberrant behavior or any red flags.  The patient continues with appropriate response to opioid therapy. ADL's remain intact by self.   --- Follow-up 2 months or sooner if needed       SULEIMAN REPORT  As part of the patient's treatment plan, I am prescribing controlled substances. The patient has been made aware of appropriate use of such medications, including potential risk of somnolence, limited ability to drive and/or work safely, and the potential for dependence or overdose. It has also bee made clear that these medications are for use by this patient only, without concomitant use of alcohol or other substances unless prescribed.     Patient has completed prescribing agreement detailing terms of continued prescribing of controlled substances, including monitoring SULEIMAN reports,  urine drug screening, and pill counts if necessary. The patient is aware that inappropriate use will results in cessation of prescribing such medications.    SULEIMAN report has been reviewed and scanned into the patient's chart.    As the clinician, I personally reviewed the SULEIMAN from 2-18-21 while the patient was in the office today.    History and physical exam exhibit continued safe and appropriate use of controlled substances.    -------    EMR Dragon/Transcription disclaimer:   Much of this encounter note is an electronic transcription/translation of spoken language to printed text. The electronic translation of spoken language may permit erroneous, or at times, nonsensical words or phrases to be inadvertently transcribed; Although I have reviewed the note for such errors, some may still exist.

## 2021-02-23 RX ORDER — GABAPENTIN 800 MG/1
TABLET ORAL
Qty: 180 TABLET | Refills: 0 | Status: SHIPPED | OUTPATIENT
Start: 2021-02-23 | End: 2021-06-01

## 2021-03-18 ENCOUNTER — TELEPHONE (OUTPATIENT)
Dept: PAIN MEDICINE | Facility: CLINIC | Age: 81
End: 2021-03-18

## 2021-03-18 RX ORDER — HYDROCODONE BITARTRATE AND ACETAMINOPHEN 10; 325 MG/1; MG/1
1 TABLET ORAL EVERY 6 HOURS PRN
Qty: 120 TABLET | Refills: 0 | Status: CANCELLED | OUTPATIENT
Start: 2021-03-18

## 2021-03-18 RX ORDER — HYDROCODONE BITARTRATE AND ACETAMINOPHEN 10; 325 MG/1; MG/1
1 TABLET ORAL EVERY 6 HOURS PRN
Qty: 120 TABLET | Refills: 0 | Status: SHIPPED | OUTPATIENT
Start: 2021-03-18 | End: 2021-04-19 | Stop reason: SDUPTHER

## 2021-03-18 NOTE — TELEPHONE ENCOUNTER
Caller: KEYLA MAGO    Relationship: SELF    Best call back number: [phone]    Medication needed:   Requested Prescriptions     Pending Prescriptions Disp Refills   • HYDROcodone-acetaminophen (NORCO)  MG per tablet 120 tablet 0     Sig: Take 1 tablet by mouth Every 6 (Six) Hours As Needed for Moderate Pain .       When do you need the refill by: ASAP 3/18/21     What additional details did the patient provide when requesting the medication: PATIENT TRIED TO REQUEST THROUGH PHARMACY BUT THEY WOULD NOT ALLOW HIM TO. WILL RUN OUT TODAY    Does the patient have less than a 3 day supply:  [x] Yes  [] No    What is the patient's preferred pharmacy:      [facility] AND [address] [place] [address]

## 2021-03-18 NOTE — TELEPHONE ENCOUNTER
Medication Refill Request    Date of phone call: 3/18/21    Medication being requested: Norco  mg sig: q6hrs PRN  Qty: 120    Date of last visit: 2/18/21    Date of last refill: 2/18/21    Next Follow up?: 4/19/21    Any new pertinent information? (i.e, new medication allergies, new use of medications, change in patient's health or condition, non-compliance or inconsistency with prescribing agreement?): n/a

## 2021-04-19 ENCOUNTER — OFFICE VISIT (OUTPATIENT)
Dept: PAIN MEDICINE | Facility: CLINIC | Age: 81
End: 2021-04-19

## 2021-04-19 VITALS
BODY MASS INDEX: 19.7 KG/M2 | WEIGHT: 133 LBS | TEMPERATURE: 96.9 F | HEART RATE: 89 BPM | OXYGEN SATURATION: 96 % | HEIGHT: 69 IN | DIASTOLIC BLOOD PRESSURE: 71 MMHG | RESPIRATION RATE: 20 BRPM | SYSTOLIC BLOOD PRESSURE: 124 MMHG

## 2021-04-19 DIAGNOSIS — G89.4 CHRONIC PAIN SYNDROME: Primary | ICD-10-CM

## 2021-04-19 DIAGNOSIS — Z79.899 ENCOUNTER FOR LONG-TERM (CURRENT) USE OF HIGH-RISK MEDICATION: ICD-10-CM

## 2021-04-19 DIAGNOSIS — M51.36 DEGENERATION OF INTERVERTEBRAL DISC OF LUMBAR REGION: ICD-10-CM

## 2021-04-19 DIAGNOSIS — M47.812 OSTEOARTHRITIS OF CERVICAL SPINE, UNSPECIFIED SPINAL OSTEOARTHRITIS COMPLICATION STATUS: ICD-10-CM

## 2021-04-19 PROCEDURE — 99214 OFFICE O/P EST MOD 30 MIN: CPT | Performed by: NURSE PRACTITIONER

## 2021-04-19 RX ORDER — OXYBUTYNIN CHLORIDE 5 MG/1
TABLET, EXTENDED RELEASE ORAL
COMMUNITY
Start: 2021-02-15

## 2021-04-19 RX ORDER — HYDROCODONE BITARTRATE AND ACETAMINOPHEN 10; 325 MG/1; MG/1
1 TABLET ORAL EVERY 6 HOURS PRN
Qty: 120 TABLET | Refills: 0 | Status: SHIPPED | OUTPATIENT
Start: 2021-04-19 | End: 2021-05-17 | Stop reason: SDUPTHER

## 2021-04-19 RX ORDER — FLUTICASONE PROPIONATE 50 MCG
SPRAY, SUSPENSION (ML) NASAL
COMMUNITY
Start: 2021-03-01

## 2021-04-19 NOTE — PROGRESS NOTES
CHIEF COMPLAINT  F/u back and neck pain. Pt sts back pain is the same, however neck pain has worsened.     Subjective   Tanya Jimenes is a 81 y.o. male  who presents for follow-up.  He has a history of chronic back and neck pain. Reports neck pain is worse, back pain is unchanged since last evaluation. Does not remember any specific injury or incident that increased pain.    Complains of pain in his neck and low back. Today his pain is 5/10VAS.  Describes the pain as nearly continuous dull and aching with intermittent sharp pain. Pain increases with turning head, bending/lifting/twisting, household chores, prolonged position; pain decreases with medication, rest. Continues with Hydrocodone 7.5/325 4/day and gabapentin 800 mg 2/day. Denies any side effects from the regimen.   The regimen helps decrease his pain by approximately half. Notes improvement in activity and function with medication.  ADL's by self.  Denies any bowel or bladder changes. Does not take Klonopin at same time as Hydrocodone. Prescribed by psychiatrist.     Cannot take NSAIDs due to CKD.  Does not want to repeat injections. Reports he has issues with his tongue afterwards.    Patient remained masked during entire encounter. No cough present. I donned a mask and eye protection throughout entire visit. Prior to donning mask and eye protection, hand hygiene was performed, as well as when it was doffed.  I was closer than 6 feet, but not for an extended period of time. No obvious exposure to any bodily fluids.    Neck Pain   This is a chronic problem. The current episode started more than 1 year ago. The problem occurs constantly. Progression since onset: worse since last evaluation. The pain is at a severity of 5/10. The pain is moderate. The symptoms are aggravated by position and bending. Pertinent negatives include no chest pain, fever, headaches, numbness, visual change or weakness. He has tried neck support and oral narcotics (gabapentin,  Hydrocodone) for the symptoms. The treatment provided moderate relief.   Back Pain  This is a chronic problem. The current episode started more than 1 year ago. The problem occurs constantly. The problem has been waxing and waning (unchanged since last evaluation) since onset. The pain is at a severity of 5/10. The pain is moderate. Associated symptoms include abdominal pain. Pertinent negatives include no bladder incontinence, bowel incontinence, chest pain, dysuria, fever, headaches, numbness or weakness. He has tried analgesics (gabapentin, Hydrocodone) for the symptoms. The treatment provided moderate relief.      PEG Assessment   What number best describes your pain on average in the past week?4  What number best describes how, during the past week, pain has interfered with your enjoyment of life?4  What number best describes how, during the past week, pain has interfered with your general activity?  4    The following portions of the patient's history were reviewed and updated as appropriate: allergies, current medications, past family history, past medical history, past social history, past surgical history and problem list.    Review of Systems   Constitutional: Positive for activity change (dec). Negative for fatigue and fever.   HENT: Negative for congestion.    Eyes: Negative for visual disturbance.   Respiratory: Negative for cough and shortness of breath.    Cardiovascular: Negative for chest pain.   Gastrointestinal: Positive for abdominal pain. Negative for bowel incontinence, constipation and diarrhea.   Endocrine: Negative for polyuria.   Genitourinary: Negative for bladder incontinence, difficulty urinating and dysuria.   Musculoskeletal: Positive for back pain, neck pain and neck stiffness.   Allergic/Immunologic: Negative for immunocompromised state.   Neurological: Negative for dizziness, weakness, light-headedness, numbness and headaches.   Psychiatric/Behavioral: Positive for sleep  "disturbance. Negative for agitation and suicidal ideas. The patient is not nervous/anxious.      I have reviewed and confirmed the accuracy of the ROS as documented by the MA/LPN/RN TAYLER Dimas      Vitals:    04/19/21 1352   BP: 124/71   Pulse: 89   Resp: 20   Temp: 96.9 °F (36.1 °C)   SpO2: 96%   Weight: 60.3 kg (133 lb)   Height: 175.3 cm (69\")   PainSc:   5   PainLoc: Back  Comment: and neck       Objective   Physical Exam  Vitals and nursing note reviewed.   Constitutional:       Appearance: Normal appearance. He is well-developed.   HENT:      Head: Normocephalic and atraumatic.   Musculoskeletal:      Cervical back: Spinous process tenderness and muscular tenderness present. Decreased range of motion.      Lumbar back: Tenderness present. Decreased range of motion.   Skin:     General: Skin is warm and dry.   Neurological:      Mental Status: He is alert.      Gait: Gait normal.   Psychiatric:         Speech: Speech normal.         Behavior: Behavior normal. Behavior is cooperative.         Thought Content: Thought content normal.         Judgment: Judgment normal.         Assessment/Plan   Diagnoses and all orders for this visit:    1. Chronic pain syndrome (Primary)    2. Degeneration of intervertebral disc of lumbar region    3. Osteoarthritis of cervical spine, unspecified spinal osteoarthritis complication status    4. Encounter for long-term (current) use of high-risk medication    Other orders  -     HYDROcodone-acetaminophen (NORCO)  MG per tablet; Take 1 tablet by mouth Every 6 (Six) Hours As Needed for Moderate Pain .  Dispense: 120 tablet; Refill: 0      --- The urine drug screen confirmation from 12-21-20 has been reviewed and the result is APPROPRIATE based on patient history and SULEIMAN report  --- The patient signed an updated copy of the controlled substance agreement on 12-21-20  --- REfill Hydrocodone. Patient appears stable with current regimen. No adverse effects. " Regarding continuation of opioids, there is no evidence of aberrant behavior or any red flags.  The patient continues with appropriate response to opioid therapy. ADL's remain intact by self.   --- Follow-up 2 months or sooner if needed       SULEIMAN REPORT  As part of the patient's treatment plan, I am prescribing controlled substances. The patient has been made aware of appropriate use of such medications, including potential risk of somnolence, limited ability to drive and/or work safely, and the potential for dependence or overdose. It has also bee made clear that these medications are for use by this patient only, without concomitant use of alcohol or other substances unless prescribed.     Patient has completed prescribing agreement detailing terms of continued prescribing of controlled substances, including monitoring SULEIMAN reports, urine drug screening, and pill counts if necessary. The patient is aware that inappropriate use will results in cessation of prescribing such medications.    SULEIMAN report has been reviewed and scanned into the patient's chart.    As the clinician, I personally reviewed the SULEIMAN from 4-19-21 while the patient was in the office today.    History and physical exam exhibit continued safe and appropriate use of controlled substances.        EMR Dragon/Transcription disclaimer:   Much of this encounter note is an electronic transcription/translation of spoken language to printed text. The electronic translation of spoken language may permit erroneous, or at times, nonsensical words or phrases to be inadvertently transcribed; Although I have reviewed the note for such errors, some may still exist.

## 2021-05-17 NOTE — TELEPHONE ENCOUNTER
Medication Refill Request    Date of phone call: 21    Medication being requested: norco 10/325mg si tab po q 6 hours prn   Qty: 120    Date of last visit: 21    Date of last refill:     SULEIMAN up to date?:     Next Follow up?: 21    Any new pertinent information? (i.e, new medication allergies, new use of medications, change in patient's health or condition, non-compliance or inconsistency with prescribing agreement?):

## 2021-05-18 RX ORDER — HYDROCODONE BITARTRATE AND ACETAMINOPHEN 10; 325 MG/1; MG/1
1 TABLET ORAL EVERY 6 HOURS PRN
Qty: 120 TABLET | Refills: 0 | Status: SHIPPED | OUTPATIENT
Start: 2021-05-18 | End: 2021-06-21 | Stop reason: SDUPTHER

## 2021-06-01 RX ORDER — GABAPENTIN 800 MG/1
TABLET ORAL
Qty: 180 TABLET | Refills: 0 | Status: SHIPPED | OUTPATIENT
Start: 2021-06-01 | End: 2021-08-30 | Stop reason: SDUPTHER

## 2021-06-21 ENCOUNTER — OFFICE VISIT (OUTPATIENT)
Dept: PAIN MEDICINE | Facility: CLINIC | Age: 81
End: 2021-06-21

## 2021-06-21 VITALS
HEIGHT: 69 IN | HEART RATE: 96 BPM | RESPIRATION RATE: 20 BRPM | TEMPERATURE: 97.1 F | OXYGEN SATURATION: 95 % | BODY MASS INDEX: 19.85 KG/M2 | SYSTOLIC BLOOD PRESSURE: 117 MMHG | DIASTOLIC BLOOD PRESSURE: 67 MMHG | WEIGHT: 134 LBS

## 2021-06-21 DIAGNOSIS — M47.812 OSTEOARTHRITIS OF CERVICAL SPINE, UNSPECIFIED SPINAL OSTEOARTHRITIS COMPLICATION STATUS: ICD-10-CM

## 2021-06-21 DIAGNOSIS — M51.36 DEGENERATION OF INTERVERTEBRAL DISC OF LUMBAR REGION: ICD-10-CM

## 2021-06-21 DIAGNOSIS — G89.4 CHRONIC PAIN SYNDROME: Primary | ICD-10-CM

## 2021-06-21 DIAGNOSIS — Z79.899 ENCOUNTER FOR LONG-TERM (CURRENT) USE OF HIGH-RISK MEDICATION: ICD-10-CM

## 2021-06-21 PROCEDURE — 99214 OFFICE O/P EST MOD 30 MIN: CPT | Performed by: NURSE PRACTITIONER

## 2021-06-21 PROCEDURE — 80305 DRUG TEST PRSMV DIR OPT OBS: CPT | Performed by: NURSE PRACTITIONER

## 2021-06-21 RX ORDER — FENOFIBRATE 120 MG/1
TABLET ORAL
COMMUNITY
End: 2021-08-16 | Stop reason: SDUPTHER

## 2021-06-21 RX ORDER — AMOXICILLIN AND CLAVULANATE POTASSIUM 500; 125 MG/1; MG/1
TABLET, FILM COATED ORAL
COMMUNITY
Start: 2021-06-08 | End: 2021-08-16

## 2021-06-21 RX ORDER — CLONAZEPAM 1 MG/1
TABLET ORAL 3 TIMES DAILY
COMMUNITY

## 2021-06-21 RX ORDER — PANTOPRAZOLE SODIUM 40 MG/1
TABLET, DELAYED RELEASE ORAL
COMMUNITY
Start: 2021-02-08 | End: 2021-08-16 | Stop reason: SDUPTHER

## 2021-06-21 RX ORDER — HYDROCODONE BITARTRATE AND ACETAMINOPHEN 10; 325 MG/1; MG/1
1 TABLET ORAL
COMMUNITY
Start: 2021-04-19 | End: 2021-08-16 | Stop reason: SDUPTHER

## 2021-06-21 RX ORDER — HYDROCODONE BITARTRATE AND ACETAMINOPHEN 10; 325 MG/1; MG/1
1 TABLET ORAL EVERY 6 HOURS PRN
Qty: 120 TABLET | Refills: 0 | Status: SHIPPED | OUTPATIENT
Start: 2021-06-21 | End: 2021-07-16 | Stop reason: SDUPTHER

## 2021-06-21 RX ORDER — ALBUTEROL SULFATE 90 UG/1
AEROSOL, METERED RESPIRATORY (INHALATION)
COMMUNITY
Start: 2021-05-03 | End: 2021-08-16 | Stop reason: SDUPTHER

## 2021-06-21 RX ORDER — DIAPER,BRIEF,INFANT-TODD,DISP
EACH MISCELLANEOUS
COMMUNITY

## 2021-06-21 NOTE — PROGRESS NOTES
"CHIEF COMPLAINT  F/u back and neck pain. Pt sts pain has worsened.     Subjective   Tanya Jimenes is a 81 y.o. male  who presents for follow-up.  He has a history of chronic back and neck pain. Reports his pain is worse since last evaluation.    [facility] Had labwork.  \"They couldn't find nothing.\" He did have elevated glucose- >350 and glucouria.  He has \"spells\" where he is dizzy and falls.  Reviewed labwork but ER note was not available for review.     Complains of pain in his neck and low back. Today his pain is 7/10VAS.  Describes the pain as continuous aching. Pain increases with activity, household chores, prolonged position; pain decreases with medication and rest.  Continues with Hydrocodone 7.5/325 4/day and gabapentin 800 mg 2/day. (reports he has been out of pain medication was 6-19-21-hospital, otherwise was out as of 6-18-21). Denies any side effects from the regimen.   The regimen helps decrease his pain by approximately 50%.   ADL's by self.  Denies any bowel or bladder changes. Does not take Klonopin at same time as Hydrocodone. Prescribed by psychiatrist.     Cannot take NSAIDs due to CKD.  Does not want to repeat injections. Reports he has issues with his tongue afterwards.    Patient remained masked during entire encounter. No cough present. I donned a mask and eye protection throughout entire visit. Prior to donning mask and eye protection, hand hygiene was performed, as well as when it was doffed.  I was closer than 6 feet, but not for an extended period of time. No obvious exposure to any bodily fluids.    Neck Pain   This is a chronic problem. The current episode started more than 1 year ago. The problem occurs constantly. Progression since onset: worse since last evaluation. The pain is at a severity of 7/10. The pain is moderate. The symptoms are aggravated by position and bending. Pertinent negatives include no chest pain, fever, headaches, numbness, visual change or " weakness. He has tried neck support and oral narcotics (gabapentin, Hydrocodone) for the symptoms. The treatment provided moderate relief.   Back Pain  This is a chronic problem. The current episode started more than 1 year ago. The problem occurs constantly. The problem has been waxing and waning (worse since last evaluation) since onset. The pain is at a severity of 7/10. The pain is moderate. Associated symptoms include abdominal pain. Pertinent negatives include no bladder incontinence, bowel incontinence, chest pain, dysuria, fever, headaches, numbness or weakness. He has tried analgesics (gabapentin, Hydrocodone) for the symptoms. The treatment provided moderate relief.          PEG Assessment   What number best describes your pain on average in the past week?7  What number best describes how, during the past week, pain has interfered with your enjoyment of life?7  What number best describes how, during the past week, pain has interfered with your general activity?  7    The following portions of the patient's history were reviewed and updated as appropriate: allergies, current medications, past family history, past medical history, past social history, past surgical history and problem list.    Review of Systems   Constitutional: Negative for activity change, fatigue and fever.   HENT: Negative for congestion.    Eyes: Negative for visual disturbance.   Respiratory: Negative for cough and shortness of breath.    Cardiovascular: Negative for chest pain.   Gastrointestinal: Positive for abdominal pain. Negative for bowel incontinence, constipation and diarrhea.   Endocrine: Negative for polyuria.   Genitourinary: Negative for bladder incontinence, difficulty urinating and dysuria.   Musculoskeletal: Positive for back pain and neck pain.   Neurological: Negative for dizziness, weakness, light-headedness, numbness and headaches.   Psychiatric/Behavioral: Negative for agitation, sleep disturbance and suicidal  "ideas. The patient is not nervous/anxious.      I have reviewed and confirmed the accuracy of the ROS as documented by the MA/LPN/RN TAYLER Dimas      Vitals:    06/21/21 1356   BP: 117/67   Pulse: 96   Resp: 20   Temp: 97.1 °F (36.2 °C)   SpO2: 95%   Weight: 60.8 kg (134 lb)   Height: 175.3 cm (69\")   PainSc:   7   PainLoc: Back  Comment: and neck     Objective   Physical Exam  Vitals and nursing note reviewed.   Constitutional:       Appearance: Normal appearance. He is well-developed.   HENT:      Head: Normocephalic and atraumatic.   Musculoskeletal:      Cervical back: Tenderness present. No bony tenderness. Spinous process tenderness and muscular tenderness present. Decreased range of motion.      Lumbar back: Tenderness present. Decreased range of motion.   Skin:     General: Skin is warm and dry.   Neurological:      Mental Status: He is alert.      Gait: Gait normal.   Psychiatric:         Speech: Speech normal.         Behavior: Behavior normal. Behavior is cooperative.         Thought Content: Thought content normal.         Judgment: Judgment normal.         Assessment/Plan   Diagnoses and all orders for this visit:    1. Chronic pain syndrome (Primary)  -     Urine Drug Screen Confirmation - Urine, Clean Catch; Future  -     POC Urine Drug Screen, Triage    2. Degeneration of intervertebral disc of lumbar region  -     Urine Drug Screen Confirmation - Urine, Clean Catch; Future  -     POC Urine Drug Screen, Triage    3. Osteoarthritis of cervical spine, unspecified spinal osteoarthritis complication status  -     Urine Drug Screen Confirmation - Urine, Clean Catch; Future  -     POC Urine Drug Screen, Triage    4. Encounter for long-term (current) use of high-risk medication  -     Urine Drug Screen Confirmation - Urine, Clean Catch; Future  -     POC Urine Drug Screen, Triage    Other orders  -     HYDROcodone-acetaminophen (NORCO)  MG per tablet; Take 1 tablet by mouth Every 6 (Six) " Hours As Needed for Moderate Pain .  Dispense: 120 tablet; Refill: 0      --- Routine UDS in office today as part of monitoring requirements for controlled substances.  The specimen was viewed and the immunoassay result reviewed and is +OPI.  This specimen will be sent to [facility] for confirmation.   Patient last refill of Hydrocodone was 5-18-21. He ran out appropriately 6-18-21. Did receive one dose of pain medication while at hospital 6-19-21 (did not specify what medication).  --- The patient signed an updated copy of the controlled substance agreement on 12-21-20  --- REfill Hydrocodone. Patient appears stable with current regimen. No adverse effects. Regarding continuation of opioids, there is no evidence of aberrant behavior or any red flags.  The patient continues with appropriate response to opioid therapy. ADL's remain intact by self.   --- Obtain records from  ER  --- Follow up with PCP ASAP. He states his PCP is Dr Nuñez and cannot get in until August. Reviewed seeing Dr Nuñez's NP's first ASAP.  --- Follow-up 2 months or sooner if needed.           SULEIMAN REPORT  As part of the patient's treatment plan, I am prescribing controlled substances. The patient has been made aware of appropriate use of such medications, including potential risk of somnolence, limited ability to drive and/or work safely, and the potential for dependence or overdose. It has also bee made clear that these medications are for use by this patient only, without concomitant use of alcohol or other substances unless prescribed.     Patient has completed prescribing agreement detailing terms of continued prescribing of controlled substances, including monitoring SULEIMAN reports, urine drug screening, and pill counts if necessary. The patient is aware that inappropriate use will results in cessation of prescribing such medications.    As the clinician, I personally reviewed the SULEIMAN from 6-21-21 while the patient was  in the office today.    History and physical exam exhibit continued safe and appropriate use of controlled substances.        EMR Dragon/Transcription disclaimer:   Much of this encounter note is an electronic transcription/translation of spoken language to printed text. The electronic translation of spoken language may permit erroneous, or at times, nonsensical words or phrases to be inadvertently transcribed; Although I have reviewed the note for such errors, some may still exist.

## 2021-07-19 RX ORDER — HYDROCODONE BITARTRATE AND ACETAMINOPHEN 10; 325 MG/1; MG/1
1 TABLET ORAL EVERY 6 HOURS PRN
Qty: 120 TABLET | Refills: 0 | Status: SHIPPED | OUTPATIENT
Start: 2021-07-19 | End: 2021-08-16 | Stop reason: SDUPTHER

## 2021-08-16 ENCOUNTER — OFFICE VISIT (OUTPATIENT)
Dept: PAIN MEDICINE | Facility: CLINIC | Age: 81
End: 2021-08-16

## 2021-08-16 VITALS
HEIGHT: 69 IN | SYSTOLIC BLOOD PRESSURE: 119 MMHG | WEIGHT: 133.8 LBS | HEART RATE: 85 BPM | TEMPERATURE: 96.7 F | RESPIRATION RATE: 16 BRPM | BODY MASS INDEX: 19.82 KG/M2 | DIASTOLIC BLOOD PRESSURE: 69 MMHG

## 2021-08-16 DIAGNOSIS — M47.812 OSTEOARTHRITIS OF CERVICAL SPINE, UNSPECIFIED SPINAL OSTEOARTHRITIS COMPLICATION STATUS: ICD-10-CM

## 2021-08-16 DIAGNOSIS — G89.4 CHRONIC PAIN SYNDROME: Primary | ICD-10-CM

## 2021-08-16 DIAGNOSIS — M51.36 DEGENERATION OF INTERVERTEBRAL DISC OF LUMBAR REGION: ICD-10-CM

## 2021-08-16 DIAGNOSIS — Z79.899 ENCOUNTER FOR LONG-TERM (CURRENT) USE OF HIGH-RISK MEDICATION: ICD-10-CM

## 2021-08-16 PROCEDURE — 99214 OFFICE O/P EST MOD 30 MIN: CPT | Performed by: NURSE PRACTITIONER

## 2021-08-16 RX ORDER — HYDROCODONE BITARTRATE AND ACETAMINOPHEN 10; 325 MG/1; MG/1
1 TABLET ORAL EVERY 6 HOURS PRN
Qty: 120 TABLET | Refills: 0 | Status: SHIPPED | OUTPATIENT
Start: 2021-08-16 | End: 2021-09-17 | Stop reason: SDUPTHER

## 2021-08-16 NOTE — PROGRESS NOTES
"CHIEF COMPLAINT  Pt is here to f/u on back pain. Pt sts he washing the dishes and felt severe pain x5 days ago. Pt sts his pain is the same since last visit.    Subjective   Tanya Jimenes is a 79 y.o. male  who presents to the office for follow-up.He has a history of chronic back and neck pain. Reports this is unchanged since last office visit. Did have a temporary flare up of pain 5 days ago. He states he had pain around his heart. He went to see his PCP. Had a cardiac work-up. \"he didn't find anything.\"   Had to decrease taking pain medication due to late office visit. His appointment was 2 days late from his previous 30 day refill. He was able to take yesterday morning.     Complains of pain in his neck and low back. Today his pain is 6/10VAS. Continues with Hydrocodone 7.5/325 4/day and gabapentin 800 mg 3/day. Denies any side effects from the regimen. The regimen helps decrease his pain moderately, unable to quantify.   Notes improved function and activity. ADL's by self.     Cannot take NSAIDs due to CKD.  Neck Pain    This is a chronic problem. The current episode started more than 1 year ago. The problem occurs constantly. Progression since onset: unchanged from last office visit. The pain is at a severity of 6/10. The pain is moderate. The symptoms are aggravated by position and bending. Pertinent negatives include no chest pain, fever, headaches, numbness, visual change or weakness. He has tried neck support and oral narcotics (gabapentin, Hydrocodone) for the symptoms. The treatment provided moderate relief.   Back Pain   This is a chronic problem. The current episode started more than 1 year ago. The problem occurs constantly. Progression since onset: unchanged from last office visit. The pain is at a severity of 6/10. The pain is moderate. Pertinent negatives include no bladder incontinence, bowel incontinence, chest pain, dysuria, fever, headaches, numbness or weakness. He has tried analgesics " prog "(gabapentin, Hydrocodone) for the symptoms. The treatment provided moderate relief.      PEG Assessment   What number best describes your pain on average in the past week?7  What number best describes how, during the past week, pain has interfered with your enjoyment of life?7  What number best describes how, during the past week, pain has interfered with your general activity?  7    The following portions of the patient's history were reviewed and updated as appropriate: allergies, current medications, past family history, past medical history, past social history, past surgical history and problem list.    Review of Systems   Constitutional: Negative for fever.   HENT: Negative for congestion.    Respiratory: Positive for shortness of breath (COPD).    Cardiovascular: Negative for chest pain.   Gastrointestinal: Negative for bowel incontinence and diarrhea.   Genitourinary: Negative for bladder incontinence, difficulty urinating and dysuria.   Musculoskeletal: Positive for back pain. Negative for neck pain.   Neurological: Negative for dizziness, weakness, numbness and headaches.   Psychiatric/Behavioral: Negative for suicidal ideas.       Vitals:    04/29/19 1300   BP: 98/60   Pulse: 80   Resp: 16   Temp: 96.8 °F (36 °C)   SpO2: 97%   Weight: 55.8 kg (123 lb)   Height: 175.3 cm (69.02\")   PainSc:   6   PainLoc: Back     Objective   Physical Exam   Constitutional: He is oriented to person, place, and time. Vital signs are normal. He appears well-developed and well-nourished. He is cooperative.   HENT:   Head: Normocephalic and atraumatic.   Nose: Nose normal.   Eyes: Conjunctivae and lids are normal.   Neck: Trachea normal. Neck supple. Muscular tenderness present. Decreased range of motion present.   Cardiovascular: Normal rate.   Pulmonary/Chest: Effort normal.   Abdominal: Normal appearance.   Musculoskeletal:        Cervical back: He exhibits tenderness.        Lumbar back: He exhibits tenderness. " "  Neurological: He is alert and oriented to person, place, and time. Gait normal.   Skin: Skin is warm, dry and intact.   Psychiatric: He has a normal mood and affect. His speech is normal and behavior is normal. Judgment and thought content normal. Cognition and memory are normal.   Nursing note and vitals reviewed.      Assessment/Plan   Tanya was seen today for back pain.    Diagnoses and all orders for this visit:    Chronic pain syndrome  -     POC Urine Drug Screen, Triage  -     Urine Drug Screen Confirmation - Urine, Clean Catch; Future    Degeneration of intervertebral disc of lumbar region    Osteoarthritis of cervical spine, unspecified spinal osteoarthritis complication status    Encounter for long-term (current) use of high-risk medication      --- The urine drug screen confirmation from 10-22-19 has been reviewed and the result is APPROPRIATE based on patient history and SULEIMAN report  --- Routine UDS in office today as part of monitoring requirements for controlled substances.  The specimen was viewed and the immunoassay result reviewed and is +OPI, +OXY, +THC, +BZD.  This specimen will be sent to [facility] laboratory for confirmation.   Last dose of Hydrocodone was 4-28-19 in the morning. Patient states \" I can't stand\" marijuana.\"  Denies any aberrant behavior.   --- Refill Hydrocodone. Will give 10 day supply due to abnormal urine drug screen POC. If normal UDS confirmation, will then send in 30 day supply.  --- Follow-up 2 months or sooner if needed.     SULEIMNA REPORT    As part of the patient's treatment plan, I am prescribing controlled substances. The patient has been made aware of appropriate use of such medications, including potential risk of somnolence, limited ability to drive and/or work safely, and the potential for dependence or overdose. It has also bee made clear that these medications are for use by this patient only, without concomitant use of alcohol or other substances unless " prescribed.     Patient has completed prescribing agreement detailing terms of continued prescribing of controlled substances, including monitoring SULEIMAN reports, urine drug screening, and pill counts if necessary. The patient is aware that inappropriate use will results in cessation of prescribing such medications.    SULEIMAN report has been reviewed and scanned into the patient's chart.    As the clinician, I personally reviewed the SULEIMAN from 4-25-19 while the patient was in the office today.    History and physical exam exhibit continued safe and appropriate use of controlled substances.      EMR Dragon/Transcription disclaimer:   Much of this encounter note is an electronic transcription/translation of spoken language to printed text. The electronic translation of spoken language may permit erroneous, or at times, nonsensical words or phrases to be inadvertently transcribed; Although I have reviewed the note for such errors, some may still exist.

## 2021-08-30 RX ORDER — GABAPENTIN 800 MG/1
800 TABLET ORAL 2 TIMES DAILY
Qty: 180 TABLET | Refills: 0 | Status: SHIPPED | OUTPATIENT
Start: 2021-08-30 | End: 2021-11-29 | Stop reason: SDUPTHER

## 2021-08-30 NOTE — TELEPHONE ENCOUNTER
Medication Refill Request    Date of phone call: 21    Medication being requested: Gabapentin 800 mg    si tab po bid  Qty: 180    Date of last visit: 21    Date of last refill:     SULEIMAN up to date?: no    Next Follow up?: 10/14/21    Any new pertinent information? (i.e, new medication allergies, new use of medications, change in patient's health or condition, non-compliance or inconsistency with prescribing agreement?):

## 2021-09-07 RX ORDER — GABAPENTIN 800 MG/1
TABLET ORAL
Qty: 180 TABLET | Refills: 0 | OUTPATIENT
Start: 2021-09-07

## 2021-09-17 RX ORDER — HYDROCODONE BITARTRATE AND ACETAMINOPHEN 10; 325 MG/1; MG/1
1 TABLET ORAL EVERY 6 HOURS PRN
Qty: 120 TABLET | Refills: 0 | Status: SHIPPED | OUTPATIENT
Start: 2021-09-17 | End: 2021-10-12 | Stop reason: SDUPTHER

## 2021-09-17 NOTE — TELEPHONE ENCOUNTER
Medication Refill Request    Date of phone call: 9/17/2021    Medication being requested: hydro/apap  mg sig: take 1 tab q 6 hrs prn  Qty: 120    Date of last visit: 8/16/2021    Date of last refill: 8/19/2021    SULEIMAN up to date?: yes    Next Follow up?: 10/04/2021    Any new pertinent information? (i.e, new medication allergies, new use of medications, change in patient's health or condition, non-compliance or inconsistency with prescribing agreement?):

## 2021-10-12 ENCOUNTER — OFFICE VISIT (OUTPATIENT)
Dept: PAIN MEDICINE | Facility: CLINIC | Age: 81
End: 2021-10-12

## 2021-10-12 VITALS
HEART RATE: 82 BPM | HEIGHT: 69 IN | BODY MASS INDEX: 19.96 KG/M2 | DIASTOLIC BLOOD PRESSURE: 69 MMHG | SYSTOLIC BLOOD PRESSURE: 123 MMHG | WEIGHT: 134.8 LBS | OXYGEN SATURATION: 92 % | TEMPERATURE: 96.9 F

## 2021-10-12 DIAGNOSIS — Z79.899 ENCOUNTER FOR LONG-TERM (CURRENT) USE OF HIGH-RISK MEDICATION: ICD-10-CM

## 2021-10-12 DIAGNOSIS — M47.812 OSTEOARTHRITIS OF CERVICAL SPINE, UNSPECIFIED SPINAL OSTEOARTHRITIS COMPLICATION STATUS: ICD-10-CM

## 2021-10-12 DIAGNOSIS — G89.4 CHRONIC PAIN SYNDROME: Primary | ICD-10-CM

## 2021-10-12 DIAGNOSIS — M51.36 DEGENERATION OF INTERVERTEBRAL DISC OF LUMBAR REGION: ICD-10-CM

## 2021-10-12 PROCEDURE — 99214 OFFICE O/P EST MOD 30 MIN: CPT | Performed by: NURSE PRACTITIONER

## 2021-10-12 RX ORDER — HYDROCODONE BITARTRATE AND ACETAMINOPHEN 10; 325 MG/1; MG/1
1 TABLET ORAL EVERY 6 HOURS PRN
Qty: 120 TABLET | Refills: 0 | Status: SHIPPED | OUTPATIENT
Start: 2021-10-12 | End: 2021-11-12 | Stop reason: SDUPTHER

## 2021-10-12 NOTE — PROGRESS NOTES
"CHIEF COMPLAINT  F/U back pain- patient states that his pain fluctuates.     Subjective   Tanya Jimenes is a 81 y.o. male  who presents for follow-up.  He has a history of chronic neck and back pain. Reports worsening right leg pain that is radiating down the right lateral leg and into the foot. Says his legs have not \"felt right\" for a while.      Complains of pain in his low back and neck. Today his pain is 5/10VAS.  Continues with Hydrocodone 7.5/325 4/day and gabapentin 800 mg 2/day. Denies any side effects from the regimen.   The regimen helps decrease his pain by approximately 30-50%.   ADL's by self.  Does not take Klonopin at same time as Hydrocodone. Prescribed by psychiatrist.      Is planning to re-start Humira for Psoriasis.    Reports he has re-started insulin. Reports he is being followed by [facility].      Cannot take NSAIDs due to CKD.    Does not want to repeat injections. Reports he has issues with his tongue afterwards.    Patient remained masked during entire encounter. No cough present. I donned a mask and eye protection throughout entire visit. Prior to donning mask and eye protection, hand hygiene was performed, as well as when it was doffed.  I was closer than 6 feet, but not for an extended period of time. No obvious exposure to any bodily fluids.    Neck Pain   This is a chronic problem. The current episode started more than 1 year ago. The problem occurs constantly. Progression since onset: unchanged from last office visit. The pain is at a severity of 5/10. The pain is moderate. The symptoms are aggravated by position and bending. Associated symptoms include weakness (farida legs). Pertinent negatives include no chest pain, fever, headaches, numbness or visual change. He has tried neck support and oral narcotics (gabapentin, Hydrocodone) for the symptoms. The treatment provided moderate relief.   Back Pain  This is a chronic problem. The current episode started more than 1 year " ago. The problem occurs constantly. Progression since onset: unchanged from last office visit. The pain is at a severity of 5/10. The pain is moderate. Associated symptoms include weakness (farida legs). Pertinent negatives include no abdominal pain, bladder incontinence, bowel incontinence, chest pain, dysuria, fever, headaches or numbness. He has tried analgesics (gabapentin, Hydrocodone) for the symptoms. The treatment provided moderate relief.      PEG Assessment   What number best describes your pain on average in the past week?5  What number best describes how, during the past week, pain has interfered with your enjoyment of life?5  What number best describes how, during the past week, pain has interfered with your general activity?  5    The following portions of the patient's history were reviewed and updated as appropriate: allergies, current medications, past family history, past medical history, past social history, past surgical history and problem list.    Review of Systems   Constitutional: Positive for activity change (decreased). Negative for chills, fatigue and fever.   HENT: Negative for congestion.    Eyes: Negative for visual disturbance.   Respiratory: Negative for chest tightness and shortness of breath.    Cardiovascular: Negative for chest pain.   Gastrointestinal: Positive for constipation. Negative for abdominal pain, bowel incontinence and diarrhea.   Genitourinary: Negative for bladder incontinence, difficulty urinating and dysuria.   Musculoskeletal: Positive for back pain. Negative for neck pain.   Neurological: Positive for weakness (farida legs). Negative for dizziness, light-headedness, numbness and headaches.   Psychiatric/Behavioral: Negative for agitation, self-injury, sleep disturbance and suicidal ideas. The patient is not nervous/anxious.      I have reviewed and confirmed the accuracy of the ROS as documented by the MA/CHU/RN TAYLER Lutz    Vitals:    10/12/21 1420   BP:  "123/69   Pulse: 82   Temp: 96.9 °F (36.1 °C)   SpO2: 92%   Weight: 61.1 kg (134 lb 12.8 oz)   Height: 175.3 cm (69\")   PainSc:   5   PainLoc: Back     Objective   Physical Exam  Vitals and nursing note reviewed.   Constitutional:       General: He is not in acute distress.     Appearance: Normal appearance. He is not ill-appearing.   Pulmonary:      Effort: Pulmonary effort is normal. No respiratory distress.   Musculoskeletal:      Cervical back: Tenderness and bony tenderness present.      Lumbar back: Tenderness and bony tenderness present. Positive right straight leg raise test.   Skin:     General: Skin is warm and dry.   Neurological:      Mental Status: He is alert and oriented to person, place, and time.      Motor: Motor function is intact.      Gait: Gait abnormal (slowed, antalgia).   Psychiatric:         Mood and Affect: Mood normal.         Behavior: Behavior normal.       Assessment/Plan   Diagnoses and all orders for this visit:    1. Chronic pain syndrome (Primary)    2. Degeneration of intervertebral disc of lumbar region  -     MRI Lumbar Spine Without Contrast; Future    3. Osteoarthritis of cervical spine, unspecified spinal osteoarthritis complication status    4. Encounter for long-term (current) use of high-risk medication      --- MRI Lumbar Spine  --- Refill Hydrocodone (dated appropriately). Patient appears stable with current regimen. No adverse effects. Regarding continuation of opioids, there is no evidence of aberrant behavior or any red flags.  The patient continues with appropriate response to opioid therapy. ADL's remain intact by self.   --- The patient signed an updated copy of the controlled substance agreement on 12/21/2020  --- The urine drug screen confirmation from 6/21/2021 has been reviewed and the result is appropriate based on patient history and SULEIMAN report  --- Follow-up 2 months            SULEIMAN REPORT  As part of the patient's treatment plan, I am prescribing " controlled substances. The patient has been made aware of appropriate use of such medications, including potential risk of somnolence, limited ability to drive and/or work safely, and the potential for dependence or overdose. It has also bee made clear that these medications are for use by this patient only, without concomitant use of alcohol or other substances unless prescribed.     Patient has completed prescribing agreement detailing terms of continued prescribing of controlled substances, including monitoring SULEIMAN reports, urine drug screening, and pill counts if necessary. The patient is aware that inappropriate use will results in cessation of prescribing such medications.    As the clinician, I personally reviewed the SULEIMAN from 10/12/2021 while the patient was in the office today.    History and physical exam exhibit continued safe and appropriate use of controlled substances.     Dictated utilizing Dragon dictation.

## 2021-10-26 ENCOUNTER — TELEPHONE (OUTPATIENT)
Dept: PAIN MEDICINE | Facility: CLINIC | Age: 81
End: 2021-10-26

## 2021-10-26 NOTE — TELEPHONE ENCOUNTER
Caller: ELIO    Relationship: [facility] - DR ANNE WOODARD    Best call back number: [phone]    What form or medical record are you requesting:LAST OFFICE NOTE AND ORDER FOR MRI    Who is requesting this form or medical record from you:PCP    How would you like to receive the form or medical records (pick-up, mail, fax): [phone]  Additional notes: NEEDS LAST OFFICE NOTE AND ORDER SO PCP CAN ORDER THE MRI        DELETE AFTER READING TO PATIENT:   “Thank you for sharing this information with me. I will send a message to the team. Please allow 48 hours for the staff to follow up on this request.”     “If this is a medical records request, please allow up to 30 days from the time the signed release form is received to process this request.”

## 2021-11-12 ENCOUNTER — TELEPHONE (OUTPATIENT)
Dept: PAIN MEDICINE | Facility: CLINIC | Age: 81
End: 2021-11-12

## 2021-11-12 NOTE — TELEPHONE ENCOUNTER
Medication Refill Request    Date of phone call: 21    Medication being requested: norco 10/325mg si tab po q 6 hours prn   Qty: 120    Date of last visit: 10/12/21    Date of last refill:     SULEIMAN up to date?:     Next Follow up?: 21    Any new pertinent information? (i.e, new medication allergies, new use of medications, change in patient's health or condition, non-compliance or inconsistency with prescribing agreement?): can you please fill for tamiko? Thank you.

## 2021-11-12 NOTE — TELEPHONE ENCOUNTER
Caller: Tanya Jimenes    Relationship: Self    Best call back number: [phone]    Requested Prescriptions:   Requested Prescriptions     Pending Prescriptions Disp Refills   • HYDROcodone-acetaminophen (NORCO)  MG per tablet 120 tablet 0     Sig: Take 1 tablet by mouth Every 6 (Six) Hours As Needed for Moderate Pain .        [facility]arm[facility] sent[facility]    Additional details provided by patient: PATIENT ALSO HAS QUESTIONS ABOUT AN MRI, SAYS HE NEVER RECD A CALL TO F/U-HE HAS NOT YET HAD THE MRI    Does the patient have less than a 3 day supply:  [x] Yes  [] No    Erick Gamino Rep   11/12/21 13:36 EST

## 2021-11-15 RX ORDER — HYDROCODONE BITARTRATE AND ACETAMINOPHEN 10; 325 MG/1; MG/1
1 TABLET ORAL EVERY 6 HOURS PRN
Qty: 120 TABLET | Refills: 0 | Status: SHIPPED | OUTPATIENT
Start: 2021-11-15 | End: 2021-12-13 | Stop reason: SDUPTHER

## 2021-11-15 NOTE — TELEPHONE ENCOUNTER
Reviewed UDS and SULEIMAN. Both updated and appropriate. Refill appropriate.      Reviewed joseluis LESTER last office visit on 10-12-21. UDS and SULEIMAN reviewed and are appropriate. Due to VERONA(MATERNITY LEAVE) being out of office, will refill appropriately.

## 2021-11-29 RX ORDER — GABAPENTIN 800 MG/1
800 TABLET ORAL 2 TIMES DAILY
Qty: 180 TABLET | Refills: 0 | Status: SHIPPED | OUTPATIENT
Start: 2021-11-29 | End: 2022-02-28

## 2021-11-29 NOTE — TELEPHONE ENCOUNTER
Incoming Refill Request      Medication requested (name and dose): GABAPENTIN 800 MG    Pharmacy where request should be sent: [facility]    Additional details provided by patient: ONLY HAS 2 LEFT    Best call back number: [phone]    Does the patient have less than a 3 day supply:  [x] Yes  [] No    Erick Portillo Rep  11/29/21, 13:57 EST

## 2021-12-13 ENCOUNTER — OFFICE VISIT (OUTPATIENT)
Dept: PAIN MEDICINE | Facility: CLINIC | Age: 81
End: 2021-12-13

## 2021-12-13 VITALS
SYSTOLIC BLOOD PRESSURE: 131 MMHG | WEIGHT: 132 LBS | OXYGEN SATURATION: 98 % | TEMPERATURE: 96.5 F | HEIGHT: 69 IN | DIASTOLIC BLOOD PRESSURE: 72 MMHG | HEART RATE: 91 BPM | BODY MASS INDEX: 19.55 KG/M2 | RESPIRATION RATE: 20 BRPM

## 2021-12-13 DIAGNOSIS — Z79.899 ENCOUNTER FOR LONG-TERM (CURRENT) USE OF HIGH-RISK MEDICATION: ICD-10-CM

## 2021-12-13 DIAGNOSIS — M47.812 OSTEOARTHRITIS OF CERVICAL SPINE, UNSPECIFIED SPINAL OSTEOARTHRITIS COMPLICATION STATUS: ICD-10-CM

## 2021-12-13 DIAGNOSIS — M54.16 LUMBAR RADICULOPATHY: ICD-10-CM

## 2021-12-13 DIAGNOSIS — G89.4 CHRONIC PAIN SYNDROME: Primary | ICD-10-CM

## 2021-12-13 DIAGNOSIS — M51.36 DEGENERATION OF INTERVERTEBRAL DISC OF LUMBAR REGION: ICD-10-CM

## 2021-12-13 PROCEDURE — 80305 DRUG TEST PRSMV DIR OPT OBS: CPT | Performed by: NURSE PRACTITIONER

## 2021-12-13 PROCEDURE — 99214 OFFICE O/P EST MOD 30 MIN: CPT | Performed by: NURSE PRACTITIONER

## 2021-12-13 RX ORDER — SIMETHICONE 80 MG
TABLET,CHEWABLE ORAL
COMMUNITY

## 2021-12-13 RX ORDER — HYDROCODONE BITARTRATE AND ACETAMINOPHEN 10; 325 MG/1; MG/1
1 TABLET ORAL EVERY 6 HOURS PRN
Qty: 120 TABLET | Refills: 0 | Status: SHIPPED | OUTPATIENT
Start: 2021-12-13 | End: 2022-01-13 | Stop reason: SDUPTHER

## 2021-12-13 RX ORDER — AMOXICILLIN 500 MG/1
CAPSULE ORAL
COMMUNITY
Start: 2021-10-05

## 2021-12-13 RX ORDER — CLOTRIMAZOLE AND BETAMETHASONE DIPROPIONATE 10; .64 MG/G; MG/G
CREAM TOPICAL
COMMUNITY
Start: 2021-07-21

## 2021-12-13 RX ORDER — LEVALBUTEROL TARTRATE 45 UG/1
AEROSOL, METERED ORAL
COMMUNITY
Start: 2021-11-27

## 2021-12-13 RX ORDER — KETOCONAZOLE 20 MG/G
CREAM TOPICAL
COMMUNITY
Start: 2021-09-30

## 2021-12-13 RX ORDER — RANITIDINE 150 MG/1
150 TABLET ORAL DAILY
COMMUNITY

## 2021-12-13 RX ORDER — HALOBETASOL PROPIONATE 0.05 %
OINTMENT (GRAM) TOPICAL
COMMUNITY
Start: 2021-08-12

## 2021-12-13 RX ORDER — HYDROCODONE BITARTRATE AND IBUPROFEN 7.5; 2 MG/1; MG/1
TABLET, FILM COATED ORAL
COMMUNITY
End: 2022-04-14 | Stop reason: ALTCHOICE

## 2021-12-13 RX ORDER — PEN NEEDLE, DIABETIC 32GX 5/32"
NEEDLE, DISPOSABLE MISCELLANEOUS
COMMUNITY
Start: 2021-11-27

## 2021-12-13 RX ORDER — ADALIMUMAB 4 MG/ML
KIT INJECTION
COMMUNITY
Start: 2021-10-07

## 2021-12-13 RX ORDER — ADALIMUMAB 40MG/0.4ML
KIT SUBCUTANEOUS
COMMUNITY
Start: 2021-11-25

## 2021-12-13 RX ORDER — PEN NEEDLE, DIABETIC 31 GX5/16"
NEEDLE, DISPOSABLE MISCELLANEOUS
COMMUNITY
Start: 2021-10-13

## 2021-12-13 RX ORDER — MELATONIN 200 MCG
3 TABLET ORAL DAILY
COMMUNITY
End: 2022-08-15 | Stop reason: SDUPTHER

## 2021-12-13 NOTE — PROGRESS NOTES
CHIEF COMPLAINT  F/u back and neck pain. Pt sts pain has worsened since last ov.     Subjective   Tanya Jimenes is a 81 y.o. male  who presents for follow-up.  He has a history of chronic back and neck pain. Reports his pain is worse since last evaluation. AT last office visit, was ordered lumbar MRI.     Complains of pain in his neck, low back and right leg. Today his pain is 3/10VAS. Describes the pain as intermittent throbbing with sharp and shooting pain. Pain increases with walking/ standing, arising from a seated position, housework;' pain decreases with medication and rest. Continues with Hydrocodone 7.5/325 4/day and gabapentin 800 mg 2/day. Denies any side effects from the regimen.   The regimen helps decrease his pain by 50%. Notes improvement in function and activity with regimen. 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   ADL's by self.  Denies any bowel or bladder changes. Does not take Klonopin at same time as Hydrocodone. Prescribed by psychiatrist.     Is planning to re-start Humira for Psoriasis.    Reports he has re-started insulin. Reports he is being followed by [facility].      Cannot take NSAIDs due to CKD.    Does not want to repeat injections. Reports he has issues with his tongue afterwards.    Patient remained masked during entire encounter. No cough present. I donned a mask and eye protection throughout entire visit. Prior to donning mask and eye protection, hand hygiene was performed, as well as when it was doffed.  I was closer than 6 feet, but not for an extended period of time. No obvious exposure to any bodily fluids.    Neck Pain   This is a chronic problem. The current episode started more than 1 year ago. The problem occurs constantly. The pain is at a severity of 3/10. The pain is moderate. The symptoms are aggravated by position and bending. Pertinent negatives include no chest pain, fever, headaches, numbness, visual change or weakness. He has tried neck  support and oral narcotics (gabapentin, Hydrocodone) for the symptoms. The treatment provided moderate relief.   Back Pain  This is a chronic problem. The current episode started more than 1 year ago. The problem occurs constantly. The problem has been waxing and waning (wosre since last evaluation) since onset. The pain radiates to the right foot, right thigh and right knee. The pain is at a severity of 3/10. The pain is moderate. Associated symptoms include abdominal pain. Pertinent negatives include no bladder incontinence, bowel incontinence, chest pain, dysuria, fever, headaches, numbness or weakness. He has tried analgesics (gabapentin, Hydrocodone) for the symptoms. The treatment provided moderate relief.      PEG Assessment   What number best describes your pain on average in the past week?5  What number best describes how, during the past week, pain has interfered with your enjoyment of life?5  What number best describes how, during the past week, pain has interfered with your general activity?  5    The following portions of the patient's history were reviewed and updated as appropriate: allergies, current medications, past family history, past medical history, past social history, past surgical history and problem list.    Review of Systems   Constitutional: Positive for activity change (dec). Negative for fatigue and fever.   HENT: Negative for congestion.    Eyes: Negative for visual disturbance.   Respiratory: Positive for shortness of breath (occ). Negative for cough.    Cardiovascular: Negative for chest pain.   Gastrointestinal: Positive for abdominal pain. Negative for bowel incontinence, constipation and diarrhea.   Genitourinary: Negative for bladder incontinence, difficulty urinating and dysuria.   Musculoskeletal: Positive for back pain, neck pain and neck stiffness (occ).   Neurological: Negative for dizziness, weakness, light-headedness, numbness and headaches.   Psychiatric/Behavioral:  "Positive for sleep disturbance (occ). Negative for agitation and suicidal ideas. The patient is not nervous/anxious.      I have reviewed and confirmed the accuracy of the ROS as documented by the MA/LPN/RN TAYLER Dimas      Vitals:    12/13/21 1257   BP: 131/72   Pulse: 91   Resp: 20   Temp: 96.5 °F (35.8 °C)   SpO2: 98%   Weight: 59.9 kg (132 lb)   Height: 175.3 cm (69\")   PainSc:   3   PainLoc: Back  Comment: and neck       Objective   Physical Exam  Vitals and nursing note reviewed.   Constitutional:       Appearance: Normal appearance. He is well-developed.   HENT:      Head: Normocephalic and atraumatic.   Musculoskeletal:      Cervical back: Tenderness present. Muscular tenderness present. Decreased range of motion.      Lumbar back: Tenderness and bony tenderness present. Decreased range of motion. Positive right straight leg raise test and positive left straight leg raise test.   Skin:     General: Skin is warm and dry.   Neurological:      Mental Status: He is alert.      Gait: Gait abnormal.      Deep Tendon Reflexes:      Reflex Scores:       Patellar reflexes are 1+ on the right side and 1+ on the left side.  Psychiatric:         Speech: Speech normal.         Behavior: Behavior normal. Behavior is cooperative.         Thought Content: Thought content normal.         Judgment: Judgment normal.         Assessment/Plan   Diagnoses and all orders for this visit:    1. Chronic pain syndrome (Primary)    2. Degeneration of intervertebral disc of lumbar region  -     MRI Lumbar Spine Without Contrast; Future    3. Osteoarthritis of cervical spine, unspecified spinal osteoarthritis complication status    4. Encounter for long-term (current) use of high-risk medication    5. Lumbar radiculopathy  -     MRI Lumbar Spine Without Contrast; Future    Other orders  -     HYDROcodone-acetaminophen (NORCO)  MG per tablet; Take 1 tablet by mouth Every 6 (Six) Hours As Needed for Moderate Pain .  " Dispense: 120 tablet; Refill: 0      --- Routine UDS in office today as part of monitoring requirements for controlled substances.  The specimen was viewed and the immunoassay result reviewed and is +OPI(APPROPRIATE).  This specimen will be sent to [facility] laboratory for confirmation.     --- The patient signed an updated copy of the controlled substance agreement on 12-13-21  --- A medication management agreement is signed by the patient and the provider today.  Reviewed with the patient that this contract is specific to controlled substances, specifically pain medication.  Reviewed core of pain medicine is to decrease pain and increase functional level.  Reviewed the medication must be picked up as a written prescription from this office and will not be called into any pharmacy.  Reviewed the use of Vikash within the office setting.  Reviewed causes for potential of discontinuation of opiates,  including but not limited to consideration for diversion, obtaining other controlled substances from other license practitioners, or  inappropriate office behavior.  Patient understands to use a controlled substance as prescribed by physician and to avoid improper use of controlled substances.  Patient will also verbalized understanding that prescriptions to be filled at the same pharmacy  unless office staff is made aware of this.  Patient understands they can be submitted to random urine drug screens and/or random pill counts on any request.  Patient understands they are not to receive early refills.  The patient must produce an official police report for any effort to replace controlled substances that are lost or stolen.  No use of illegal street drugs while receiving controlled substances from this prescribing provider.  The patient is to take medication as prescribed  and not deviate from the normal schedule without consultation from the provider.  Patient is not to share the medication with others or to take  medication with alcohol or other sedatives.  Use caution when driving or operating machinery.  Alert office if the patient becomes pregnant or begins nursing a child.  Reviewed the use of controlled substances recreates a risk for respiratory depression, which may result in serious harm or even death.  Must always keep the prescription in the original container.  Patient is to store controlled substances in a locked cabinet or other secure storage unit that is cool, dry and out of sunlight.  Patient must immediately notify office if any controlled substances is stolen or improperly taken by another individual.  Reviewed risk for physical dependence, tolerance and addiction.  --- MRI-lumbar--- patient requests [facility] for MRI.  --- Refill Hydrocodone. Patient appears stable with current regimen. No adverse effects. Regarding continuation of opioids, there is no evidence of aberrant behavior or any red flags.  The patient continues with appropriate response to opioid therapy. ADL's remain intact by self.   --- Follow-up 2 months or sooner if needed.       SULEIMAN REPORT  As part of the patient's treatment plan, I am prescribing controlled substances. The patient has been made aware of appropriate use of such medications, including potential risk of somnolence, limited ability to drive and/or work safely, and the potential for dependence or overdose. It has also bee made clear that these medications are for use by this patient only, without concomitant use of alcohol or other substances unless prescribed.     Patient has completed prescribing agreement detailing terms of continued prescribing of controlled substances, including monitoring SULEIMAN reports, urine drug screening, and pill counts if necessary. The patient is aware that inappropriate use will results in cessation of prescribing such medications.    As the clinician, I personally reviewed the SULEIMAN from 12-13-21 while the patient was in the office  today.    History and physical exam exhibit continued safe and appropriate use of controlled substances.       Dictated utilizing Dragon dictation.     This document is intended for medical expert use only. Reading of this document by patients and/or patient's family without participating medical staff guidance may result in misinterpretation and unintended morbidity.   Any interpretation of such data is the responsibility of the patient and/or family member responsible for the patient in concert with their primary or specialist providers, not to be left for sources of online searches such as [employer] or similar queries. Relying on these approaches to knowledge may result in misinterpretation, misguided goals of care and even death should patients or family members try recommendations outside of the realm of professional medical care in a supervised way.

## 2022-01-13 ENCOUNTER — TELEPHONE (OUTPATIENT)
Dept: PAIN MEDICINE | Facility: CLINIC | Age: 82
End: 2022-01-13

## 2022-01-13 RX ORDER — HYDROCODONE BITARTRATE AND ACETAMINOPHEN 10; 325 MG/1; MG/1
1 TABLET ORAL EVERY 6 HOURS PRN
Qty: 120 TABLET | Refills: 0 | Status: SHIPPED | OUTPATIENT
Start: 2022-01-13 | End: 2022-02-14 | Stop reason: SDUPTHER

## 2022-01-13 NOTE — TELEPHONE ENCOUNTER
Caller: KEYLA GOODSONUBB    Relationship: SELF    Best call back number:     Requested Prescriptions:   Requested Prescriptions     Pending Prescriptions Disp Refills   • HYDROcodone-acetaminophen (NORCO)  MG per tablet 120 tablet 0     Sig: Take 1 tablet by mouth Every 6 (Six) Hours As Needed for Moderate Pain .        Pharmacy where request should be sent:    [facility]/pharmacy #[id number] - [address] AT [address] - [phone] [facility] [phone]   [phone]    Additional details provided by patient: PATIENT HAS 2 DAYS LEFT OF MEDICATION    Does the patient have less than a 3 day supply:  [x] Yes  [] No    Erick Pace Rep   01/13/22 10:49 EST

## 2022-02-14 ENCOUNTER — OFFICE VISIT (OUTPATIENT)
Dept: PAIN MEDICINE | Facility: CLINIC | Age: 82
End: 2022-02-14

## 2022-02-14 VITALS
TEMPERATURE: 97.1 F | DIASTOLIC BLOOD PRESSURE: 77 MMHG | SYSTOLIC BLOOD PRESSURE: 134 MMHG | HEART RATE: 95 BPM | OXYGEN SATURATION: 97 % | HEIGHT: 69 IN | BODY MASS INDEX: 19.61 KG/M2 | WEIGHT: 132.4 LBS

## 2022-02-14 DIAGNOSIS — M51.36 DEGENERATION OF INTERVERTEBRAL DISC OF LUMBAR REGION: ICD-10-CM

## 2022-02-14 DIAGNOSIS — M47.812 OSTEOARTHRITIS OF CERVICAL SPINE, UNSPECIFIED SPINAL OSTEOARTHRITIS COMPLICATION STATUS: ICD-10-CM

## 2022-02-14 DIAGNOSIS — G89.4 CHRONIC PAIN SYNDROME: Primary | ICD-10-CM

## 2022-02-14 DIAGNOSIS — Z79.899 ENCOUNTER FOR LONG-TERM (CURRENT) USE OF HIGH-RISK MEDICATION: ICD-10-CM

## 2022-02-14 PROCEDURE — 99214 OFFICE O/P EST MOD 30 MIN: CPT | Performed by: NURSE PRACTITIONER

## 2022-02-14 RX ORDER — HYDROCODONE BITARTRATE AND ACETAMINOPHEN 10; 325 MG/1; MG/1
1 TABLET ORAL EVERY 6 HOURS PRN
Qty: 120 TABLET | Refills: 0 | Status: SHIPPED | OUTPATIENT
Start: 2022-02-14 | End: 2022-03-09 | Stop reason: SDUPTHER

## 2022-02-14 RX ORDER — PRIMIDONE 50 MG/1
50 TABLET ORAL
COMMUNITY
Start: 2022-01-20 | End: 2022-08-15 | Stop reason: SDUPTHER

## 2022-02-14 NOTE — PROGRESS NOTES
CHIEF COMPLAINT  Back and neck pain    Subjective   Tanya Jimenes is a 81 y.o. male  who presents for follow-up.  He has a history of chronic back and neck pain. Reports his pain is WORSE since last evaluation. HAs complaints of new right hip pain. This started approximately 1 month ago. Does not remember any specific injury or trauma.  This has been worsening. He also complains of left eye pain that started a few days ago. Also having some vision changes on that side.    Complains of pain in his low back, neck, right hip and left eye. Today his pain is 8/10VAS. Describes his pain as continuous aching and throbbing with intermittent sharp pain. Pain increases with activity, movement, household chores; pain decreases with medication and rest. Continues with Hydrocodone 7.5/325 4/day and gabapentin 800 mg 2/day. Denies any side effects from the regimen.   The regimen helps decrease his pain by 40%. Notes improvement in function and activity with regimen. 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   ADL's by self.  Denies any bowel or bladder changes. Does not take Klonopin at same time as Hydrocodone. Prescribed by psychiatrist.     Reports he has re-started insulin. Reports he is being followed by [facility].      Cannot take NSAIDs due to CKD.    Does not want to repeat injections. Reports he has issues with his tongue afterwards.    Patient remained masked during entire encounter. No cough present. I donned a mask and eye protection throughout entire visit. Prior to donning mask and eye protection, hand hygiene was performed, as well as when it was doffed.  I was closer than 6 feet, but not for an extended period of time. No obvious exposure to any bodily fluids.    Neck Pain   This is a chronic problem. The current episode started more than 1 year ago. The problem occurs constantly. The pain is at a severity of 3/10. The pain is moderate. The symptoms are aggravated by position and bending.  Pertinent negatives include no chest pain, fever, headaches, numbness, visual change or weakness. He has tried neck support and oral narcotics (gabapentin, Hydrocodone) for the symptoms. The treatment provided moderate relief.   Back Pain  This is a chronic problem. The current episode started more than 1 year ago. The problem occurs constantly. The problem has been waxing and waning (wosre since last evaluation) since onset. The pain radiates to the right foot, right thigh and right knee. The pain is at a severity of 3/10. The pain is moderate. Associated symptoms include abdominal pain. Pertinent negatives include no bladder incontinence, bowel incontinence, chest pain, dysuria, fever, headaches, numbness or weakness. He has tried analgesics (gabapentin, Hydrocodone) for the symptoms. The treatment provided moderate relief.            Dr Uriostegui consult 1-19-22 (neurology)      The following portions of the patient's history were reviewed and updated as appropriate: allergies, current medications, past family history, past medical history, past social history, past surgical history and problem list.    Review of Systems   Constitutional: Negative for fever.   HENT: Positive for rhinorrhea.    Eyes: Positive for pain and discharge.   Respiratory: Negative for cough and shortness of breath.    Cardiovascular: Negative for chest pain and leg swelling.   Gastrointestinal: Positive for abdominal pain. Negative for abdominal distention, bowel incontinence, constipation and diarrhea.   Endocrine: Negative for cold intolerance and heat intolerance.   Genitourinary: Negative for bladder incontinence, difficulty urinating, dysuria and frequency.   Musculoskeletal: Positive for back pain, gait problem and neck pain.   Skin: Positive for rash. Negative for wound.   Neurological: Negative for dizziness, weakness, numbness and headaches.   Hematological: Does not bruise/bleed easily.   Psychiatric/Behavioral: Negative for  "agitation and confusion.     I have reviewed and confirmed the accuracy of the ROS as documented by the MA/LPN/RN TAYLER Dimas      Vitals:    02/14/22 1356   BP: 134/77   Pulse: 95   Temp: 97.1 °F (36.2 °C)   SpO2: 97%   Weight: 60.1 kg (132 lb 6.4 oz)   Height: 175.3 cm (69\")   PainSc:   8   PainLoc: Hip       Objective   Physical Exam  Vitals and nursing note reviewed.   Constitutional:       Appearance: Normal appearance. He is well-developed.   HENT:      Head: Normocephalic and atraumatic.   Eyes:      Conjunctiva/sclera:      Left eye: Left conjunctiva is injected (slightly).   Musculoskeletal:      Cervical back: Tenderness present. Muscular tenderness present. Decreased range of motion.      Lumbar back: Tenderness and bony tenderness present. Decreased range of motion. Positive right straight leg raise test and positive left straight leg raise test.   Skin:     General: Skin is warm and dry.   Neurological:      Mental Status: He is alert.      Gait: Gait abnormal (walking stick).      Deep Tendon Reflexes:      Reflex Scores:       Patellar reflexes are 1+ on the right side and 1+ on the left side.  Psychiatric:         Speech: Speech normal.         Behavior: Behavior normal. Behavior is cooperative.         Thought Content: Thought content normal.         Judgment: Judgment normal.         Assessment/Plan   Diagnoses and all orders for this visit:    1. Chronic pain syndrome (Primary)    2. Degeneration of intervertebral disc of lumbar region    3. Osteoarthritis of cervical spine, unspecified spinal osteoarthritis complication status    4. Encounter for long-term (current) use of high-risk medication    Other orders  -     HYDROcodone-acetaminophen (NORCO)  MG per tablet; Take 1 tablet by mouth Every 6 (Six) Hours As Needed for Moderate Pain .  Dispense: 120 tablet; Refill: 0      --- The urine drug screen confirmation from 12-13-21 has been reviewed and the result is APPROPRIATE based " on patient history and SULEIMAN report  --- The patient signed an updated copy of the controlled substance agreement on 12-13-21  --- Reviewed lumbar MRI with patient. See above.   --- Declines epidural/facet joint injection  --- Declines MDP.  --- Declines neurosurgical evaluation.  --- Refill hydrocodone. Patient appears stable with current regimen. No adverse effects. Regarding continuation of opioids, there is no evidence of aberrant behavior or any red flags.  The patient continues with appropriate response to opioid therapy. ADL's remain intact by self.   --- ENCOURAGED PATIENT TO CONTACT [facility]/OPTHAMOLOGIST IMMEDIATELY IN REGARDS TO EYE PAIN AND VISION CHANGES. DISCUSSED ER BUT PATIENT REFUSED.  --- Follow-up 2 months or sooner if needed         USLEIMAN REPORT  As part of the patient's treatment plan, I am prescribing controlled substances. The patient has been made aware of appropriate use of such medications, including potential risk of somnolence, limited ability to drive and/or work safely, and the potential for dependence or overdose. It has also bee made clear that these medications are for use by this patient only, without concomitant use of alcohol or other substances unless prescribed.     Patient has completed prescribing agreement detailing terms of continued prescribing of controlled substances, including monitoring SULEIMAN reports, urine drug screening, and pill counts if necessary. The patient is aware that inappropriate use will results in cessation of prescribing such medications.    As the clinician, I personally reviewed the SULEIMAN from 2-14-22 while the patient was in the office today.    History and physical exam exhibit continued safe and appropriate use of controlled substances.       Dictated utilizing Dragon dictation.     This document is intended for medical expert use only. Reading of this document by patients and/or patient's family without participating medical staff  guidance may result in misinterpretation and unintended morbidity.   Any interpretation of such data is the responsibility of the patient and/or family member responsible for the patient in concert with their primary or specialist providers, not to be left for sources of online searches such as [facility], [employer] or similar queries. Relying on these approaches to knowledge may result in misinterpretation, misguided goals of care and even death should patients or family members try recommendations outside of the realm of professional medical care in a supervised way.

## 2022-02-24 ENCOUNTER — TELEPHONE (OUTPATIENT)
Dept: PAIN MEDICINE | Facility: CLINIC | Age: 82
End: 2022-02-24

## 2022-02-24 NOTE — TELEPHONE ENCOUNTER
Caller: KEYLA MERCEDES    Relationship: SELF    Best call back number: [phone]    Requested Prescriptions:   gabapentin (NEURONTIN) 800 MG tablet [[id number]] (Order [id number])        [facility]      Additional details provided by patient:     Does the patient have less than a 3 day supply:  [x] Yes  [x] No    Erick Torres Rep   02/24/22 15:29 EST

## 2022-02-25 RX ORDER — GABAPENTIN 800 MG/1
800 TABLET ORAL 2 TIMES DAILY
Qty: 180 TABLET | Refills: 0 | Status: CANCELLED | OUTPATIENT
Start: 2022-02-25

## 2022-02-28 RX ORDER — GABAPENTIN 800 MG/1
TABLET ORAL
Qty: 180 TABLET | Refills: 0 | Status: SHIPPED | OUTPATIENT
Start: 2022-02-28 | End: 2022-05-27 | Stop reason: SDUPTHER

## 2022-03-09 NOTE — TELEPHONE ENCOUNTER
Medication Refill Request    Date of phone call: 03/09/2022    Medication being requested: Hydrocodone  mg sig: Take 1 tablet by mouth Every 6 (Six) Hours As Needed for Moderate Pain   Qty: 120    Date of last visit: 02/14/2022    Date of last refill:     SULEIMAN up to date?:     Next Follow up?: 04/14/2022    Any new pertinent information? (i.e, new medication allergies, new use of medications, change in patient's health or condition, non-compliance or inconsistency with prescribing agreement?):

## 2022-03-10 RX ORDER — HYDROCODONE BITARTRATE AND ACETAMINOPHEN 10; 325 MG/1; MG/1
1 TABLET ORAL EVERY 6 HOURS PRN
Qty: 120 TABLET | Refills: 0 | Status: SHIPPED | OUTPATIENT
Start: 2022-03-10 | End: 2022-04-14 | Stop reason: SDUPTHER

## 2022-04-14 ENCOUNTER — OFFICE VISIT (OUTPATIENT)
Dept: PAIN MEDICINE | Facility: CLINIC | Age: 82
End: 2022-04-14

## 2022-04-14 VITALS
DIASTOLIC BLOOD PRESSURE: 62 MMHG | TEMPERATURE: 96.9 F | WEIGHT: 132.2 LBS | RESPIRATION RATE: 12 BRPM | SYSTOLIC BLOOD PRESSURE: 117 MMHG | BODY MASS INDEX: 19.58 KG/M2 | OXYGEN SATURATION: 95 % | HEIGHT: 69 IN | HEART RATE: 91 BPM

## 2022-04-14 DIAGNOSIS — M51.36 DEGENERATION OF INTERVERTEBRAL DISC OF LUMBAR REGION: ICD-10-CM

## 2022-04-14 DIAGNOSIS — Z79.899 ENCOUNTER FOR LONG-TERM (CURRENT) USE OF HIGH-RISK MEDICATION: ICD-10-CM

## 2022-04-14 DIAGNOSIS — G89.4 CHRONIC PAIN SYNDROME: Primary | ICD-10-CM

## 2022-04-14 DIAGNOSIS — M47.812 OSTEOARTHRITIS OF CERVICAL SPINE, UNSPECIFIED SPINAL OSTEOARTHRITIS COMPLICATION STATUS: ICD-10-CM

## 2022-04-14 PROCEDURE — 99214 OFFICE O/P EST MOD 30 MIN: CPT | Performed by: NURSE PRACTITIONER

## 2022-04-14 RX ORDER — HYDROCODONE BITARTRATE AND ACETAMINOPHEN 10; 325 MG/1; MG/1
1 TABLET ORAL EVERY 6 HOURS PRN
Qty: 120 TABLET | Refills: 0 | Status: SHIPPED | OUTPATIENT
Start: 2022-04-14 | End: 2022-05-11 | Stop reason: SDUPTHER

## 2022-04-14 NOTE — PROGRESS NOTES
CHIEF COMPLAINT  FOLLOW UP BACK AND NECK PAIN   2 Month evaluation- Patient in office today reports his pain has continued stable .     Subjective   Tanya Jimenes is a 82 y.o. male  who presents for follow-up.  He has a history of chronic back and neck pain. Reports his pain pattern is VARIABLE since last evaluation.    At last office visit he was reporting worsening right hip pain.  Started approximately 1 month prior to the appointment.  Does not remember any specific trauma or injury.  Also complains of left eye pain and vision changes.  Patient declined any epidural or facet joint injections, declined MDP, declined neurosurgical evaluation.  Encourage patient to contact [facility] and ophthalmologist immediately.  Refill hydrocodone.  Follow-up in 2 months or sooner if needed.    Had an incidence of weakness early in this month. Had generalized weakness. Also had significant stomach pump. He reports he did not go to the hospital.  However, there is an encounter from  ED on 4-4-22, but I cannot see what was performed or how was treated. States PCP is sending him to a neurologist.     Complains of pain in his neck and low back. Today his pain is 6/10VAS. Describes his pain as continuous throbbing and aching with intermittent sharp pain. Pain increases with activity, chores, walking/standing; pain decreases with medication and rest. Continues with Hydrocodone 7.5/325 4/day and gabapentin 800 mg 2/day. Denies any side effects from the regimen.   The regimen helps decrease his pain half usually. Notes improvement in function and activity with regimen. 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   ADL's by self.  Denies any bowel or bladder changes. Does not take Klonopin at same time as Hydrocodone. Prescribed by psychiatrist.     Reports he has re-started insulin. Reports he is being followed by [facility].      Cannot take NSAIDs due to CKD.    Does not want to repeat injections.  Reports he has issues with his tongue afterwards.    Patient remained masked during entire encounter. No cough present. I donned a mask and eye protection throughout entire visit. Prior to donning mask and eye protection, hand hygiene was performed, as well as when it was doffed.  I was closer than 6 feet, but not for an extended period of time. No obvious exposure to any bodily fluids.    Neck Pain   This is a chronic problem. The current episode started more than 1 year ago. The problem occurs constantly. Progression since onset: unchanged sinec last evaluation. The pain is at a severity of 6/10. The pain is moderate. The symptoms are aggravated by position and bending. Pertinent negatives include no chest pain, fever, headaches, numbness, visual change or weakness. He has tried neck support and oral narcotics (gabapentin, Hydrocodone) for the symptoms. The treatment provided moderate relief.   Back Pain  This is a chronic problem. The current episode started more than 1 year ago. The problem occurs constantly. The problem has been waxing and waning (unchanged since last evaluation) since onset. The pain radiates to the right foot, right thigh and right knee. The pain is at a severity of 6/10. The pain is moderate. Pertinent negatives include no abdominal pain, bladder incontinence, bowel incontinence, chest pain, dysuria, fever, headaches, numbness or weakness. He has tried analgesics (gabapentin, Hydrocodone) for the symptoms. The treatment provided moderate relief.      The following portions of the patient's history were reviewed and updated as appropriate: allergies, current medications, past family history, past medical history, past social history, past surgical history and problem list.    Review of Systems   Constitutional: Negative for activity change, fatigue and fever.   HENT: Negative for congestion.    Eyes: Negative for visual disturbance.   Respiratory: Negative for cough and chest tightness.   "  Cardiovascular: Negative for chest pain.   Gastrointestinal: Negative for abdominal pain, bowel incontinence, constipation and diarrhea.   Genitourinary: Negative for bladder incontinence, difficulty urinating and dysuria.   Musculoskeletal: Positive for back pain and neck pain.   Neurological: Negative for dizziness, weakness, light-headedness, numbness and headaches.   Psychiatric/Behavioral: Positive for sleep disturbance. Negative for agitation and suicidal ideas. The patient is not nervous/anxious.      I have reviewed and confirmed the accuracy of the ROS as documented by the MA/LPN/RN TAYLER Dimas    Vitals:    04/14/22 1346   BP: 117/62   BP Location: Right arm   Patient Position: Sitting   Pulse: 91   Resp: 12   Temp: 96.9 °F (36.1 °C)   SpO2: 95%   Weight: 60 kg (132 lb 3.2 oz)   Height: 175.3 cm (69\")   PainSc:   6   PainLoc: Back  Comment: neck       Objective   Physical Exam  Vitals and nursing note reviewed.   Constitutional:       Appearance: Normal appearance. He is well-developed.   HENT:      Head: Normocephalic and atraumatic.   Eyes:      Conjunctiva/sclera:      Left eye: Left conjunctiva is injected (slightly).   Abdominal:          Comments: Surgical scar of lumbar spine   Musculoskeletal:      Cervical back: Tenderness present. Muscular tenderness present. Decreased range of motion.      Lumbar back: Tenderness and bony tenderness present. Decreased range of motion. Positive right straight leg raise test and positive left straight leg raise test.   Skin:     General: Skin is warm and dry.   Neurological:      Mental Status: He is alert.      Gait: Gait abnormal (walking stick).      Deep Tendon Reflexes:      Reflex Scores:       Patellar reflexes are 1+ on the right side and 1+ on the left side.  Psychiatric:         Speech: Speech normal.         Behavior: Behavior normal. Behavior is cooperative.         Thought Content: Thought content normal.         Judgment: Judgment " normal.         Assessment/Plan   Diagnoses and all orders for this visit:    1. Chronic pain syndrome (Primary)    2. Degeneration of intervertebral disc of lumbar region    3. Osteoarthritis of cervical spine, unspecified spinal osteoarthritis complication status    4. Encounter for long-term (current) use of high-risk medication    Other orders  -     HYDROcodone-acetaminophen (NORCO)  MG per tablet; Take 1 tablet by mouth Every 6 (Six) Hours As Needed for Moderate Pain .  Dispense: 120 tablet; Refill: 0      --- The urine drug screen confirmation from 12-13-21 has been reviewed and the result is APPROPRIATE based on patient history and SULEIMAN report  --- The patient signed an updated copy of the controlled substance agreement on 12-13-21  --- Refill Hydrocodone. Patient appears stable with current regimen. No adverse effects. Regarding continuation of opioids, there is no evidence of aberrant behavior or any red flags.  The patient continues with appropriate response to opioid therapy. ADL's remain intact by self.   --- Follow up with other specialists as planned.   --- Follow-up 2 months or sooner if needed           SULEIMAN REPORT  As part of the patient's treatment plan, I am prescribing controlled substances. The patient has been made aware of appropriate use of such medications, including potential risk of somnolence, limited ability to drive and/or work safely, and the potential for dependence or overdose. It has also bee made clear that these medications are for use by this patient only, without concomitant use of alcohol or other substances unless prescribed.     Patient has completed prescribing agreement detailing terms of continued prescribing of controlled substances, including monitoring SULEIMAN reports, urine drug screening, and pill counts if necessary. The patient is aware that inappropriate use will results in cessation of prescribing such medications.    As the clinician, I personally  reviewed the SULEIMAN from 4-14-22 while the patient was in the office today.    History and physical exam exhibit continued safe and appropriate use of controlled substances.       Dictated utilizing Dragon dictation.     This document is intended for medical expert use only. Reading of this document by patients and/or patient's family without participating medical staff guidance may result in misinterpretation and unintended morbidity.   Any interpretation of such data is the responsibility of the patient and/or family member responsible for the patient in concert with their primary or specialist providers, not to be left for sources of online searches such as [employer], [place] or similar queries. Relying on these approaches to knowledge may result in misinterpretation, misguided goals of care and even death should patients or family members try recommendations outside of the realm of professional medical care in a supervised way.

## 2022-05-11 RX ORDER — HYDROCODONE BITARTRATE AND ACETAMINOPHEN 10; 325 MG/1; MG/1
1 TABLET ORAL EVERY 6 HOURS PRN
Qty: 120 TABLET | Refills: 0 | Status: SHIPPED | OUTPATIENT
Start: 2022-05-11 | End: 2022-06-14 | Stop reason: SDUPTHER

## 2022-05-11 NOTE — TELEPHONE ENCOUNTER
Medication Refill Request    Date of phone call: 22    Medication being requested: Norco  mg    si tab po q 6 hrs prn  Qty: 120    Date of last visit: 22    Date of last refill:     SULEIMAN up to date?: no    Next Follow up?: 22    Any new pertinent information? (i.e, new medication allergies, new use of medications, change in patient's health or condition, non-compliance or inconsistency with prescribing agreement?):

## 2022-05-27 RX ORDER — GABAPENTIN 800 MG/1
800 TABLET ORAL 2 TIMES DAILY
Qty: 180 TABLET | Refills: 0 | Status: SHIPPED | OUTPATIENT
Start: 2022-05-27 | End: 2022-08-15 | Stop reason: SDUPTHER

## 2022-05-27 NOTE — TELEPHONE ENCOUNTER
Medication Refill Request    Date of phone call: 05/27/2022    Medication being requested: GABAPENTIN 800 MG  sig: TAKE 1 TABLET BY MOUTH TWICE A DAY  Qty: 180    Date of last visit: 04/14/2022    Date of last refill:     SULEIMAN up to date?:     Next Follow up?: 06/14/2022    Any new pertinent information? (i.e, new medication allergies, new use of medications, change in patient's health or condition, non-compliance or inconsistency with prescribing agreement?): can you please refill for Elizabeth , I tried calling patient back to see how many tabs he has left but reached  and its not set up to leave a  . THANKS LBS .

## 2022-06-14 ENCOUNTER — OFFICE VISIT (OUTPATIENT)
Dept: PAIN MEDICINE | Facility: CLINIC | Age: 82
End: 2022-06-14

## 2022-06-14 VITALS
OXYGEN SATURATION: 96 % | BODY MASS INDEX: 19.4 KG/M2 | TEMPERATURE: 97.8 F | SYSTOLIC BLOOD PRESSURE: 104 MMHG | RESPIRATION RATE: 20 BRPM | HEART RATE: 85 BPM | HEIGHT: 69 IN | DIASTOLIC BLOOD PRESSURE: 69 MMHG | WEIGHT: 131 LBS

## 2022-06-14 DIAGNOSIS — G89.4 CHRONIC PAIN SYNDROME: Primary | ICD-10-CM

## 2022-06-14 DIAGNOSIS — M47.812 OSTEOARTHRITIS OF CERVICAL SPINE, UNSPECIFIED SPINAL OSTEOARTHRITIS COMPLICATION STATUS: ICD-10-CM

## 2022-06-14 DIAGNOSIS — M51.36 DEGENERATION OF INTERVERTEBRAL DISC OF LUMBAR REGION: ICD-10-CM

## 2022-06-14 DIAGNOSIS — Z79.899 ENCOUNTER FOR LONG-TERM (CURRENT) USE OF HIGH-RISK MEDICATION: ICD-10-CM

## 2022-06-14 LAB
POC AMPHETAMINES: NEGATIVE
POC BARBITURATES: POSITIVE
POC BENZODIAZEPHINES: NEGATIVE
POC COCAINE: NEGATIVE
POC METHADONE: NEGATIVE
POC METHAMPHETAMINE SCREEN URINE: NEGATIVE
POC OPIATES: NEGATIVE
POC OXYCODONE: NEGATIVE
POC PHENCYCLIDINE: NEGATIVE
POC PROPOXYPHENE: NEGATIVE
POC THC: NEGATIVE
POC TRICYCLIC ANTIDEPRESSANTS: NEGATIVE

## 2022-06-14 PROCEDURE — 99214 OFFICE O/P EST MOD 30 MIN: CPT

## 2022-06-14 PROCEDURE — 80305 DRUG TEST PRSMV DIR OPT OBS: CPT

## 2022-06-14 RX ORDER — NALOXONE HYDROCHLORIDE 4 MG/.1ML
1 SPRAY NASAL AS NEEDED
Qty: 2 EACH | Refills: 0 | Status: SHIPPED | OUTPATIENT
Start: 2022-06-14

## 2022-06-14 RX ORDER — HYDROCODONE BITARTRATE AND ACETAMINOPHEN 10; 325 MG/1; MG/1
1 TABLET ORAL EVERY 6 HOURS PRN
Qty: 120 TABLET | Refills: 0 | Status: SHIPPED | OUTPATIENT
Start: 2022-06-14 | End: 2022-07-11 | Stop reason: SDUPTHER

## 2022-06-14 RX ORDER — PRIMIDONE 50 MG/1
100 TABLET ORAL
COMMUNITY
Start: 2022-04-20

## 2022-06-14 RX ORDER — MELATONIN
1000 DAILY
COMMUNITY

## 2022-06-14 NOTE — PROGRESS NOTES
CHIEF COMPLAINT  Back and neck pain.     Subjective   Tanya Jimenes is a 82 y.o. male  who presents for follow-up.  He has a history of chronic neck and back pain. He reports that his pain has increased since his last office visit.     Today pain is 7/10VAS in severity. Pain is located lower back and neck. Describes this pain as an intermittent dull ache. Back pain radiates into right leg. Pain is worsened by certain movements and prolonged position. Pain improved with rest and medication.    Continues with Hydrocodone 7.5/325mg 4/day and Gabapentin 800mg 2/day. He takes Klonipin 1mg TID (managed by psychiatrist). He reports this helps him sleep at night. He does not take with Hydrocodone. Reports mild constipation but maintains a high fiber diet. Denies other side effects including somnolence. The regimen helps decrease pain by a moderate amount allowing for improvement in activity and function. ADL's by self. Denies any bowel or bladder changes.     Reported left eye pain and vision changes at last office visit but states this has resolved. He saw opthalmologist and got a new prescription for glasses.    PCP is located at the [facility]    Patient remained masked during entire encounter. No cough present. I donned a mask and eye protection throughout entire visit. Prior to donning mask and eye protection, hand hygiene was performed, as well as when it was doffed.  I was closer than 6 feet, but not for an extended period of time. No obvious exposure to any bodily fluids.    Neck Pain   This is a chronic problem. The current episode started more than 1 year ago. The problem occurs constantly. The problem has been waxing and waning. The pain is associated with nothing. The pain is present in the midline. The quality of the pain is described as aching (dull ache). The pain is at a severity of 7/10. The symptoms are aggravated by twisting and position (household chores, prolonged position). Associated symptoms  include tingling. Pertinent negatives include no chest pain, fever, headaches, numbness or weakness. He has tried neck support and oral narcotics (Hydrocodone and Gabapentin) for the symptoms. The treatment provided mild relief.   Back Pain  This is a chronic problem. The current episode started more than 1 year ago. The problem occurs constantly. The problem has been waxing and waning since onset. The quality of the pain is described as aching (dull ache). The pain radiates to the right thigh, right knee and right foot. The pain is at a severity of 7/10. The pain is moderate. The symptoms are aggravated by position and standing. Associated symptoms include tingling. Pertinent negatives include no chest pain, fever, headaches, numbness or weakness. He has tried analgesics (Hydrocodone and Gabapentin) for the symptoms. The treatment provided mild relief.      PEG Assessment   What number best describes your pain on average in the past week?7  What number best describes how, during the past week, pain has interfered with your enjoyment of life?7  What number best describes how, during the past week, pain has interfered with your general activity?  7    Review of Pertinent Medical Data ---  Reviewed office note from 4/14/22 with TAYLER Eduardo. Patient seen that day for worsening right hip pain. He declined epidurals and facet joint injections (stated he has issues with his tongue afterwards), and MDP. Also reported left eye pain and vision changes but declined neurosurgical evaluation at that time. Patient unable to take NSAIDS due to CKD. Prescribed Klonipin by a psychiatrist.      MRI Lumbar Spine 1/12/22       The following portions of the patient's history were reviewed and updated as appropriate: allergies, current medications, past family history, past medical history, past social history, past surgical history and problem list.    Review of Systems   Constitutional: Positive for activity change (dec) and  "fatigue (occ). Negative for fever and unexpected weight change.   HENT: Negative for congestion.    Eyes: Negative for visual disturbance.   Respiratory: Negative for cough and shortness of breath.    Cardiovascular: Negative for chest pain.   Gastrointestinal: Negative for constipation and diarrhea.   Genitourinary: Negative for difficulty urinating.   Musculoskeletal: Positive for back pain, neck pain and neck stiffness.   Neurological: Positive for tingling. Negative for dizziness, seizures, weakness, light-headedness, numbness and headaches.   Psychiatric/Behavioral: Positive for sleep disturbance (occ). Negative for agitation and suicidal ideas. The patient is not nervous/anxious.      I have reviewed and confirmed the accuracy of the ROS as documented by the MA/LPN/RN TAYLER [place]    Vitals:    06/14/22 1253   BP: 104/69   Pulse: 85   Resp: 20   Temp: 97.8 °F (36.6 °C)   SpO2: 96%   Weight: 59.4 kg (131 lb)   Height: 175.3 cm (69\")   PainSc:   7   PainLoc: Back  Comment: and neck     Objective   Physical Exam  Constitutional:       Appearance: Normal appearance.   HENT:      Head: Normocephalic.   Neck:      Comments: + cervical facet loading    Cardiovascular:      Rate and Rhythm: Normal rate and regular rhythm.   Pulmonary:      Effort: Pulmonary effort is normal.      Breath sounds: Normal breath sounds.   Abdominal:      General: Bowel sounds are normal.   Musculoskeletal:      Cervical back: Pain with movement and muscular tenderness present. Decreased range of motion.      Lumbar back: Tenderness present. Decreased range of motion. Positive right straight leg raise test and positive left straight leg raise test.   Skin:     General: Skin is warm and dry.      Capillary Refill: Capillary refill takes less than 2 seconds.   Neurological:      General: No focal deficit present.      Mental Status: He is alert and oriented to person, place, and time.      Deep Tendon Reflexes:      Reflex " Scores:       Patellar reflexes are 1+ on the right side and 1+ on the left side.  Psychiatric:         Mood and Affect: Mood normal.         Behavior: Behavior normal.     Assessment & Plan   Diagnoses and all orders for this visit:    1. Chronic pain syndrome (Primary)    2. Degeneration of intervertebral disc of lumbar region    3. Osteoarthritis of cervical spine, unspecified spinal osteoarthritis complication status    4. Encounter for long-term (current) use of high-risk medication    --- Routine UDS in office today as part of monitoring requirements for controlled substances.  The specimen was viewed and the immunoassay result reviewed and is +BAR, - OPI (appropriate based on SULEIMAN). This specimen will be sent to [facility] for confirmation.     --- Patient signed an updated copy of the controlled substance agreement 12/13/21  --- Refill Hydrocodone. Patient appears stable with current regimen. No adverse effects. Regarding continuation of opioids, there is no evidence of aberrant behavior or any red flags.  The patient continues with appropriate response to opioid therapy. ADL's remain intact by self.   --- Sent prescription for Narcan to pharmacy per clinical guidelines  --- Follow up with other specialist as needed  --- Follow-up 2 months or sooner if needed.     SULEIMAN REPORT  As part of the patient's treatment plan, I am prescribing controlled substances. The patient has been made aware of appropriate use of such medications, including potential risk of somnolence, limited ability to drive and/or work safely, and the potential for dependence or overdose. It has also bee made clear that these medications are for use by this patient only, without concomitant use of alcohol or other substances unless prescribed.     Patient has completed prescribing agreement detailing terms of continued prescribing of controlled substances, including monitoring SULEIMAN reports, urine drug screening, and pill  counts if necessary. The patient is aware that inappropriate use will results in cessation of prescribing such medications.    As the clinician, I personally reviewed the SULEIMAN from 6/14/22 while the patient was in the office today (Scanned into chart)    History and physical exam exhibit continued safe and appropriate use of controlled substances.    Dictated utilizing Dragon dictation.     This document is intended for medical expert use only. Reading of this document by patients and/or patient's family without participating medical staff guidance may result in misinterpretation and unintended morbidity.   Any interpretation of such data is the responsibility of the patient and/or family member responsible for the patient in concert with their primary or specialist providers, not to be left for sources of online searches such as [place], [employer] or similar queries. Relying on these approaches to knowledge may result in misinterpretation, misguided goals of care and even death should patients or family members try recommendations outside of the realm of professional medical care in a supervised way.

## 2022-07-11 NOTE — TELEPHONE ENCOUNTER
Medication Refill Request    Date of phone call: 22    Medication being requested: Norco  mg    si tab po q 6 hrs prn  Qty: 120    Date of last visit: 22    Date of last refill:     SULEIMAN up to date?: no    Next Follow up?: 8/15/22    Any new pertinent information? (i.e, new medication allergies, new use of medications, change in patient's health or condition, non-compliance or inconsistency with prescribing agreement?):

## 2022-07-12 RX ORDER — HYDROCODONE BITARTRATE AND ACETAMINOPHEN 10; 325 MG/1; MG/1
1 TABLET ORAL EVERY 6 HOURS PRN
Qty: 120 TABLET | Refills: 0 | Status: SHIPPED | OUTPATIENT
Start: 2022-07-14 | End: 2022-08-15 | Stop reason: SDUPTHER

## 2022-08-15 ENCOUNTER — OFFICE VISIT (OUTPATIENT)
Dept: PAIN MEDICINE | Facility: CLINIC | Age: 82
End: 2022-08-15

## 2022-08-15 VITALS
SYSTOLIC BLOOD PRESSURE: 105 MMHG | WEIGHT: 133 LBS | TEMPERATURE: 97.7 F | HEIGHT: 69 IN | RESPIRATION RATE: 16 BRPM | OXYGEN SATURATION: 95 % | HEART RATE: 96 BPM | DIASTOLIC BLOOD PRESSURE: 67 MMHG | BODY MASS INDEX: 19.7 KG/M2

## 2022-08-15 DIAGNOSIS — M51.36 DEGENERATION OF INTERVERTEBRAL DISC OF LUMBAR REGION: ICD-10-CM

## 2022-08-15 DIAGNOSIS — M54.16 LUMBAR RADICULOPATHY: ICD-10-CM

## 2022-08-15 DIAGNOSIS — Z79.899 ENCOUNTER FOR LONG-TERM (CURRENT) USE OF HIGH-RISK MEDICATION: Primary | ICD-10-CM

## 2022-08-15 DIAGNOSIS — M47.812 OSTEOARTHRITIS OF CERVICAL SPINE, UNSPECIFIED SPINAL OSTEOARTHRITIS COMPLICATION STATUS: ICD-10-CM

## 2022-08-15 DIAGNOSIS — M54.2 NECK PAIN: ICD-10-CM

## 2022-08-15 DIAGNOSIS — G89.4 CHRONIC PAIN SYNDROME: ICD-10-CM

## 2022-08-15 DIAGNOSIS — M54.12 CERVICAL RADICULOPATHY: ICD-10-CM

## 2022-08-15 PROCEDURE — 99214 OFFICE O/P EST MOD 30 MIN: CPT | Performed by: NURSE PRACTITIONER

## 2022-08-15 RX ORDER — HYDROCODONE BITARTRATE AND ACETAMINOPHEN 10; 325 MG/1; MG/1
1 TABLET ORAL EVERY 6 HOURS PRN
Qty: 120 TABLET | Refills: 0 | Status: SHIPPED | OUTPATIENT
Start: 2022-08-15 | End: 2022-09-09 | Stop reason: SDUPTHER

## 2022-08-15 RX ORDER — GABAPENTIN 800 MG/1
800 TABLET ORAL 2 TIMES DAILY
Qty: 180 TABLET | Refills: 0 | Status: SHIPPED | OUTPATIENT
Start: 2022-08-15 | End: 2022-11-28 | Stop reason: SDUPTHER

## 2022-08-15 NOTE — PROGRESS NOTES
CHIEF COMPLAINT  Follow up for back and neck pain, pt states pain is the same since last visit.    Subjective   Tanya Jimenes is a 82 y.o. male  who presents for follow-up.  He has a history of back and neck pain.  Today his pain is 6/10VAS in severity. Continues with Hydrocodone 7.5/325mg 4/day and Gabapentin 800mg 2/day.  His medication regimen decreases his pain by a significant amount. ADLs by self. He denies any side effects including somnolence or constipation. He manages  His constipation with fiber.     He continues to utilize Klonopin which is managed by his psychiatrist. Patient educated on increase risk of sudden death or accidental overdose with opioid and benzodiazepine use. Patient also educated on use of Narcan in office today.  He does have this prescription at home.     Patient is not a candidate for NSAIDs due to chronic kidney disease.  Has previously done interventions but does not want to repeat these due to having issues with time afterwards.    Neck Pain   This is a chronic problem. The current episode started more than 1 year ago. The problem occurs constantly. The problem has been waxing and waning. The pain is at a severity of 6/10. The pain is moderate. The symptoms are aggravated by position and bending. Pertinent negatives include no chest pain, fever, headaches, numbness, visual change or weakness. He has tried neck support and oral narcotics (gabapentin, Hydrocodone) for the symptoms. The treatment provided moderate relief.   Back Pain  This is a chronic problem. The current episode started more than 1 year ago. The problem occurs constantly. The problem has been waxing and waning since onset. The pain radiates to the right foot, right thigh and right knee. The pain is at a severity of 6/10. The pain is moderate. Pertinent negatives include no abdominal pain, bladder incontinence, bowel incontinence, chest pain, dysuria, fever, headaches, numbness or weakness. He has tried analgesics  (gabapentin, Hydrocodone) for the symptoms. The treatment provided moderate relief.      PEG Assessment   What number best describes your pain on average in the past week?6  What number best describes how, during the past week, pain has interfered with your enjoyment of life?6  What number best describes how, during the past week, pain has interfered with your general activity?  7    Review of Pertinent Medical Data ---  This visit from 6/14/2022 with TAYLER Alvarenga reviewed.  Patient has a history of back and neck pain.  He is maintained on Norco 7.5/325 4/day and gabapentin 800 mg dead/day.  He is also on Klonopin 1 mg 3 times daily which is managed by his psychiatrist.  He has declined epidurals and facet joint injections, he has also declined neurosurgical evaluation in the past.  Continue with medication management.  Narcan sent per clinical guidelines.  Follow-up 2 months or sooner if needed.    The following portions of the patient's history were reviewed and updated as appropriate: allergies, current medications, past family history, past medical history, past social history, past surgical history and problem list.    Review of Systems   Constitutional: Negative for fever.   HENT: Negative for congestion.    Eyes: Positive for visual disturbance.   Respiratory: Negative for shortness of breath.    Cardiovascular: Negative for chest pain.   Gastrointestinal: Positive for constipation and diarrhea. Negative for abdominal pain and bowel incontinence.   Genitourinary: Negative for bladder incontinence, difficulty urinating and dysuria.   Musculoskeletal: Positive for back pain and neck pain.   Neurological: Negative for weakness, numbness and headaches.   Psychiatric/Behavioral: Negative for suicidal ideas.     --  The aforementioned information the Chief Complaint section and above subjective data including any HPI data, and also the Review of Systems data, has been personally reviewed and  "affirmed.  --    Vitals:    08/15/22 1423   BP: 105/67   Pulse: 96   Resp: 16   Temp: 97.7 °F (36.5 °C)   SpO2: 95%   Weight: 60.3 kg (133 lb)   Height: 175.3 cm (69.02\")   PainSc:   6   PainLoc: Back  Comment: Neck     Objective   Physical Exam  Vitals and nursing note reviewed.   Constitutional:       Appearance: Normal appearance. He is well-developed.   Eyes:      General: Lids are normal.   Cardiovascular:      Rate and Rhythm: Normal rate.   Pulmonary:      Effort: Pulmonary effort is normal.   Musculoskeletal:      Cervical back: Tenderness present. Decreased range of motion.      Lumbar back: Tenderness present. Decreased range of motion.   Neurological:      Mental Status: He is alert and oriented to person, place, and time.   Psychiatric:         Attention and Perception: Attention normal.         Mood and Affect: Mood normal.         Speech: Speech normal.         Behavior: Behavior normal.         Judgment: Judgment normal.       Assessment & Plan   Diagnoses and all orders for this visit:    1. Encounter for long-term (current) use of high-risk medication (Primary)    2. Chronic pain syndrome    3. Lumbar radiculopathy    4. Degeneration of intervertebral disc of lumbar region    5. Neck pain    6. Osteoarthritis of cervical spine, unspecified spinal osteoarthritis complication status    7. Cervical radiculopathy    Other orders  -     HYDROcodone-acetaminophen (NORCO)  MG per tablet; Take 1 tablet by mouth Every 6 (Six) Hours As Needed for Moderate Pain .  Dispense: 120 tablet; Refill: 0      --- The urine drug screen confirmation from 6/14/2022 has been reviewed and the result is appropriate based on patient history and SULEIMAN report  --- CSA updated 12/13/2021  --- Refill [place] 7.5/325. Patient appears stable with current regimen. No adverse effects. Regarding continuation of opioids, there is no evidence of aberrant behavior or any red flags.  The patient continues with appropriate response to " opioid therapy. ADL's remain intact by self.   --- Narcan prescription current at home.   --- Follow-up 2 months or sooner if needed.      SULEIMAN REPORT  As part of the patient's treatment plan, I am prescribing controlled substances. The patient has been made aware of appropriate use of such medications, including potential risk of somnolence, limited ability to drive and/or work safely, and the potential for dependence or overdose. It has also been made clear that these medications are for use by this patient only, without concomitant use of alcohol or other substances unless prescribed.     Patient has completed prescribing agreement detailing terms of continued prescribing of controlled substances, including monitoring SULEIMAN reports, urine drug screening, and pill counts if necessary. The patient is aware that inappropriate use will results in cessation of prescribing such medications.    As the clinician, I personally reviewed the SULEIMAN from 8/15/2022 while the patient was in the office today.    History and physical exam exhibit continued safe and appropriate use of controlled substances.    Dictated utilizing Dragon dictation.     This document is intended for medical expert use only. Reading of this document by patients and/or patient's family without participating medical staff guidance may result in misinterpretation and unintended morbidity.   Any interpretation of such data is the responsibility of the patient and/or family member responsible for the patient in concert with their primary or specialist providers, not to be left for sources of online searches such as [employer], [employer] or similar queries. Relying on these approaches to knowledge may result in misinterpretation, misguided goals of care and even death should patients or family members try recommendations outside of the realm of professional medical care in a supervised way.    Patient remained masked during entire encounter. No cough present. I  donned a mask and eye protection throughout entire visit. Prior to donning mask and eye protection, hand hygiene was performed, as well as when it was doffed.  I was closer than 6 feet, but not for an extended period of time. No obvious exposure to any bodily fluids.

## 2022-09-09 NOTE — TELEPHONE ENCOUNTER
Medication Refill Request    Date of phone call: 22    Medication being requested: Norco  mg    si tab po q 6 hrs prn  Qty: 120    Date of last visit: 8/15/22    Date of last refill:     SULEIMAN up to date?: no    Next Follow up?: 10/3/22    Any new pertinent information? (i.e, new medication allergies, new use of medications, change in patient's health or condition, non-compliance or inconsistency with prescribing agreement?):

## 2022-09-12 RX ORDER — HYDROCODONE BITARTRATE AND ACETAMINOPHEN 10; 325 MG/1; MG/1
1 TABLET ORAL EVERY 6 HOURS PRN
Qty: 120 TABLET | Refills: 0 | Status: SHIPPED | OUTPATIENT
Start: 2022-09-12 | End: 2022-09-15 | Stop reason: SDUPTHER

## 2022-09-15 ENCOUNTER — TELEPHONE (OUTPATIENT)
Dept: PAIN MEDICINE | Facility: CLINIC | Age: 82
End: 2022-09-15

## 2022-09-15 RX ORDER — HYDROCODONE BITARTRATE AND ACETAMINOPHEN 10; 325 MG/1; MG/1
1 TABLET ORAL EVERY 6 HOURS PRN
Qty: 120 TABLET | Refills: 0 | Status: SHIPPED | OUTPATIENT
Start: 2022-09-15 | End: 2022-10-13 | Stop reason: SDUPTHER

## 2022-09-15 NOTE — TELEPHONE ENCOUNTER
Please see Dr. Steele for Jeana pt. I was on hold several minutes before closing to take care of this so I wasn't able to cancel the norco Rx or find out if [facility] has it in stock. Pt called stg he is out of meds and would like Rx sent to [facility]. Can you please send to [facility] on file? Ria, if Dr. Steele sends this overnight, then please disregard. Called and informed pt. Please advise. Thank you.

## 2022-09-15 NOTE — TELEPHONE ENCOUNTER
Relationship: [facility]     Best call back number: [phone]     Requested Prescriptions:   Requested Prescriptions     Pending Prescriptions Disp Refills   • HYDROcodone-acetaminophen (NORCO)  MG per tablet 120 tablet 0     Sig: Take 1 tablet by mouth Every 6 (Six) Hours As Needed for Moderate Pain. DNF 9/14/2022        Pharmacy where request should be sent: [id number] - [address] - [phone] [facility] [phone] FX     Additional details provided by patient: [facility] IN [place] CALLED STATING THEY ARE OUT OF THE MEDICATION, THEY ARE REQUESTING TO PLEASE S[facility]      Does the patient have less than a 3 day supply:  [x] Yes  [] No

## 2022-09-16 NOTE — TELEPHONE ENCOUNTER
Patient called stating [facility] is requesting a diagnosis code to proceed with refill . I was on hold for more than 15 minutes so had to fax over the diagnosis code with patient medication info .

## 2022-10-04 ENCOUNTER — TELEPHONE (OUTPATIENT)
Dept: PAIN MEDICINE | Facility: CLINIC | Age: 82
End: 2022-10-04

## 2022-10-04 NOTE — TELEPHONE ENCOUNTER
I spoke with Mr. Jimenes and he states that he doesn't want and injection. He states that he got it done one time and had a reaction to it. He wants to know if there is a pain cream you can call in for him?

## 2022-10-04 NOTE — TELEPHONE ENCOUNTER
Mr. Jimenes and stated that he is having right leg sciatica pain since last wednesday. He would like to know what he can do for the pain.

## 2022-10-05 NOTE — TELEPHONE ENCOUNTER
I will prescribe a compounded pain cream to be sent to [facility].  Please explain that the pharmacy will contact him to get his billing information and then will typically mail it to him. Thanks

## 2022-10-13 NOTE — TELEPHONE ENCOUNTER
Medication Refill Request    Date of phone call: 10/13/22    Medication being requested: Hydrocodone  sig: Take 1 tablet by mouth Every 6 (Six) Hours As Needed   Qty: 120    Date of last visit: 8/15/22    Date of last refill: 9/15/22    SULEIMAN up to date?: Yes    Next Follow up?: 10/24/22    Any new pertinent information? (i.e, new medication allergies, new use of medications, change in patient's health or condition, non-compliance or inconsistency with prescribing agreement?): Patient's appointment was pushed out due to testing positive for Covid.

## 2022-10-14 RX ORDER — HYDROCODONE BITARTRATE AND ACETAMINOPHEN 10; 325 MG/1; MG/1
1 TABLET ORAL EVERY 6 HOURS PRN
Qty: 36 TABLET | Refills: 0 | Status: SHIPPED | OUTPATIENT
Start: 2022-10-14 | End: 2022-10-24 | Stop reason: SDUPTHER

## 2022-10-14 NOTE — TELEPHONE ENCOUNTER
Spoke with [facility] pharmacy tech stated patient last picked up NORCO script on 9/16/2022. I did notify patient you sent in a temporary supply to last up until his OV . He states he is completely out of medication but verbally understood and knows he will be able to pick his medication up on 10/16/2022.

## 2022-10-14 NOTE — TELEPHONE ENCOUNTER
Will give temporary supply to last until scheduled office visit.     Reviewed UDS and SULEIMAN. Both updated and appropriate. Refill appropriate.

## 2022-10-24 ENCOUNTER — OFFICE VISIT (OUTPATIENT)
Dept: PAIN MEDICINE | Facility: CLINIC | Age: 82
End: 2022-10-24

## 2022-10-24 VITALS
DIASTOLIC BLOOD PRESSURE: 62 MMHG | WEIGHT: 133 LBS | HEART RATE: 80 BPM | OXYGEN SATURATION: 96 % | TEMPERATURE: 97.8 F | RESPIRATION RATE: 20 BRPM | SYSTOLIC BLOOD PRESSURE: 107 MMHG | BODY MASS INDEX: 19.7 KG/M2 | HEIGHT: 69 IN

## 2022-10-24 DIAGNOSIS — M51.36 DEGENERATION OF INTERVERTEBRAL DISC OF LUMBAR REGION: ICD-10-CM

## 2022-10-24 DIAGNOSIS — M54.2 NECK PAIN: ICD-10-CM

## 2022-10-24 DIAGNOSIS — M54.16 LUMBAR RADICULOPATHY: ICD-10-CM

## 2022-10-24 DIAGNOSIS — M54.12 CERVICAL RADICULOPATHY: ICD-10-CM

## 2022-10-24 DIAGNOSIS — G89.4 CHRONIC PAIN SYNDROME: ICD-10-CM

## 2022-10-24 DIAGNOSIS — M47.812 OSTEOARTHRITIS OF CERVICAL SPINE, UNSPECIFIED SPINAL OSTEOARTHRITIS COMPLICATION STATUS: ICD-10-CM

## 2022-10-24 DIAGNOSIS — Z79.899 ENCOUNTER FOR LONG-TERM (CURRENT) USE OF HIGH-RISK MEDICATION: Primary | ICD-10-CM

## 2022-10-24 PROCEDURE — 99214 OFFICE O/P EST MOD 30 MIN: CPT | Performed by: NURSE PRACTITIONER

## 2022-10-24 RX ORDER — HYDROCODONE BITARTRATE AND ACETAMINOPHEN 10; 325 MG/1; MG/1
1 TABLET ORAL EVERY 6 HOURS PRN
Qty: 120 TABLET | Refills: 0 | Status: SHIPPED | OUTPATIENT
Start: 2022-10-24 | End: 2022-11-18 | Stop reason: SDUPTHER

## 2022-10-24 RX ORDER — CEPHALEXIN 250 MG/1
CAPSULE ORAL
COMMUNITY
Start: 2022-10-11

## 2022-10-24 RX ORDER — AMOXICILLIN AND CLAVULANATE POTASSIUM 500; 125 MG/1; MG/1
TABLET, FILM COATED ORAL
COMMUNITY
Start: 2022-10-19

## 2022-10-24 NOTE — PROGRESS NOTES
"CHIEF COMPLAINT  F/u back and neck pain. Pt sts pain has worsened.     Subjective   Tanya Jimenes is a 82 y.o. male  who presents for follow-up.  He has a history of neck and back pain.  Today his pain is 7/10VAS in severity. Continues with Hydrocodone 10/325mg 4/day and Gabapentin 800mg 2/day. His medication regimen decreases his pain \"a lot\". He denies any side effects including somnolence or constipation.     He states that he fell ~1 week ago. He states he tripped over something laying on the floor which caused his fall. He was evaluated in the ED for this and had no acute fractures. Discussed that his worsening pain from his fall should improve over time.     Patient has previously declined any epidurals, facet joint injections, MDP, or neurosurgical evaluation.    Cannot take NSAIDs due to CKD.      He continues on Humira for his Psoriasis.     Neck Pain   This is a chronic problem. The current episode started more than 1 year ago. The problem occurs constantly. The problem has been waxing and waning. The pain is at a severity of 7/10. The pain is moderate. The symptoms are aggravated by position and bending. Pertinent negatives include no chest pain, fever, headaches, numbness, visual change or weakness. He has tried neck support and oral narcotics (gabapentin, Hydrocodone) for the symptoms. The treatment provided moderate relief.   Back Pain  This is a chronic problem. The current episode started more than 1 year ago. The problem occurs constantly. The problem has been waxing and waning since onset. The pain radiates to the right foot, right thigh and right knee. The pain is at a severity of 7/10. The pain is moderate. Pertinent negatives include no abdominal pain, bladder incontinence, bowel incontinence, chest pain, dysuria, fever, headaches, numbness or weakness. He has tried analgesics (gabapentin, Hydrocodone) for the symptoms. The treatment provided moderate relief.      PEG Assessment   What number " "best describes your pain on average in the past week?7  What number best describes how, during the past week, pain has interfered with your enjoyment of life?7  What number best describes how, during the past week, pain has interfered with your general activity?  7    The following portions of the patient's history were reviewed and updated as appropriate: allergies, current medications, past family history, past medical history, past social history, past surgical history and problem list.    Review of Systems   Constitutional: Negative for activity change, fatigue and fever.   HENT: Positive for hearing loss ([place]). Negative for congestion.    Eyes: Negative for visual disturbance.   Respiratory: Negative for cough and shortness of breath.    Cardiovascular: Negative for chest pain.   Gastrointestinal: Negative for abdominal pain, bowel incontinence, constipation and diarrhea.   Genitourinary: Negative for bladder incontinence, difficulty urinating and dysuria.   Musculoskeletal: Positive for back pain, gait problem (cane) and neck pain.   Neurological: Negative for dizziness, weakness, light-headedness, numbness and headaches.   Psychiatric/Behavioral: Negative for agitation, sleep disturbance and suicidal ideas. The patient is not nervous/anxious.      --  The aforementioned information the Chief Complaint section and above subjective data including any HPI data, and also the Review of Systems data, has been personally reviewed and affirmed.  --    Vitals:    10/24/22 1520   BP: 107/62   Pulse: 80   Resp: 20   Temp: 97.8 °F (36.6 °C)   SpO2: 96%   Weight: 60.3 kg (133 lb)   Height: 175.3 cm (69.02\")   PainSc:   7   PainLoc: Back  Comment: and neck     Objective   Physical Exam  Vitals and nursing note reviewed.   Constitutional:       Appearance: Normal appearance. He is well-developed.   Eyes:      General: Lids are normal.   Cardiovascular:      Rate and Rhythm: Normal rate.   Pulmonary:      Effort: Pulmonary " effort is normal.   Musculoskeletal:      Cervical back: Tenderness present. Decreased range of motion.      Lumbar back: Tenderness and bony tenderness present. Decreased range of motion.   Neurological:      Mental Status: He is alert and oriented to person, place, and time.   Psychiatric:         Attention and Perception: Attention normal.         Mood and Affect: Mood normal.         Speech: Speech normal.         Behavior: Behavior normal.         Judgment: Judgment normal.       Assessment & Plan   Diagnoses and all orders for this visit:    1. Encounter for long-term (current) use of high-risk medication (Primary)    2. Chronic pain syndrome    3. Lumbar radiculopathy    4. Degeneration of intervertebral disc of lumbar region    5. Cervical radiculopathy    6. Neck pain    7. Osteoarthritis of cervical spine, unspecified spinal osteoarthritis complication status    Other orders  -     HYDROcodone-acetaminophen (NORCO)  MG per tablet; Take 1 tablet by mouth Every 6 (Six) Hours As Needed for Moderate Pain.  Dispense: 120 tablet; Refill: 0      --- The urine drug screen confirmation from 6/14/2022 has been reviewed and the result is appropriate based on patient history and SULEIMAN report  --- CSA updated 12/13/2021  --- Refill [place] 10/325. Patient appears stable with current regimen. No adverse effects. Regarding continuation of opioids, there is no evidence of aberrant behavior or any red flags.  The patient continues with appropriate response to opioid therapy. ADL's remain intact by self.   --- Narcan prescription current.   --- Follow-up 2 months or sooner if needed.      SULEIMAN REPORT  As part of the patient's treatment plan, I am prescribing controlled substances. The patient has been made aware of appropriate use of such medications, including potential risk of somnolence, limited ability to drive and/or work safely, and the potential for dependence or overdose. It has also been made clear that  these medications are for use by this patient only, without concomitant use of alcohol or other substances unless prescribed.     Patient has completed prescribing agreement detailing terms of continued prescribing of controlled substances, including monitoring SULEIMAN reports, urine drug screening, and pill counts if necessary. The patient is aware that inappropriate use will results in cessation of prescribing such medications.    As the clinician, I personally reviewed the SULEIMAN from 10/24/2022 while the patient was in the office today.    History and physical exam exhibit continued safe and appropriate use of controlled substances.    Dictated utilizing Dragon dictation.     This document is intended for medical expert use only. Reading of this document by patients and/or patient's family without participating medical staff guidance may result in misinterpretation and unintended morbidity.   Any interpretation of such data is the responsibility of the patient and/or family member responsible for the patient in concert with their primary or specialist providers, not to be left for sources of online searches such as [employer], [employer] or similar queries. Relying on these approaches to knowledge may result in misinterpretation, misguided goals of care and even death should patients or family members try recommendations outside of the realm of professional medical care in a supervised way.    Patient remained masked during entire encounter. No cough present. I donned a mask and eye protection throughout entire visit. Prior to donning mask and eye protection, hand hygiene was performed, as well as when it was doffed.  I was closer than 6 feet, but not for an extended period of time. No obvious exposure to any bodily fluids.

## 2022-11-18 RX ORDER — HYDROCODONE BITARTRATE AND ACETAMINOPHEN 10; 325 MG/1; MG/1
1 TABLET ORAL EVERY 6 HOURS PRN
Qty: 120 TABLET | Refills: 0 | Status: SHIPPED | OUTPATIENT
Start: 2022-11-18 | End: 2022-12-21 | Stop reason: SDUPTHER

## 2022-11-18 NOTE — TELEPHONE ENCOUNTER
Medication Refill Request    Date of phone call: 11/18/2022    Medication being requested: hydro/apap  mg sig: take 1 tab 6 hrs prn  Qty: 120    Date of last visit: 10/24/2022    Date of last refill: 10/24/2022    SULEIMAN up to date?: yes    Next Follow up?: 12/23/2022    Any new pertinent information? (i.e, new medication allergies, new use of medications, change in patient's health or condition, non-compliance or inconsistency with prescribing agreement?):

## 2022-11-28 NOTE — TELEPHONE ENCOUNTER
Medication Refill Request    Date of phone call: 22    Medication being requested: Gabapentin 800 mg   si tab po bid  Qty: 180    Date of last visit: 10/24/22    Date of last refill:     SULEIMAN up to date?:     Next Follow up?: 22    Any new pertinent information? (i.e, new medication allergies, new use of medications, change in patient's health or condition, non-compliance or inconsistency with prescribing agreement?):

## 2022-11-29 RX ORDER — GABAPENTIN 800 MG/1
800 TABLET ORAL 2 TIMES DAILY
Qty: 180 TABLET | Refills: 0 | Status: SHIPPED | OUTPATIENT
Start: 2022-11-29 | End: 2023-02-23

## 2022-12-21 RX ORDER — HYDROCODONE BITARTRATE AND ACETAMINOPHEN 10; 325 MG/1; MG/1
1 TABLET ORAL EVERY 6 HOURS PRN
Qty: 28 TABLET | Refills: 0 | Status: SHIPPED | OUTPATIENT
Start: 2022-12-21 | End: 2022-12-29 | Stop reason: SDUPTHER

## 2022-12-21 NOTE — TELEPHONE ENCOUNTER
Reviewed NAE and SULEIMAN. Both updated and appropriate. Refill appropriate.  Temporary supply.

## 2022-12-21 NOTE — TELEPHONE ENCOUNTER
Medication Refill Request    Date of phone call: 22    Medication being requested: Norco  mg    si tab po q 6 hrs prn  Qty: 120    Date of last visit: 10/24/22    Date of last refill:     SULEIMAN up to date?:     Next Follow up?: 22    Any new pertinent information? (i.e, new medication allergies, new use of medications, change in patient's health or condition, non-compliance or inconsistency with prescribing agreement?): rescheduled to  with karolina. Needs partial refill

## 2022-12-29 ENCOUNTER — OFFICE VISIT (OUTPATIENT)
Dept: PAIN MEDICINE | Facility: CLINIC | Age: 82
End: 2022-12-29

## 2022-12-29 VITALS
WEIGHT: 135.6 LBS | OXYGEN SATURATION: 100 % | HEIGHT: 69 IN | HEART RATE: 78 BPM | RESPIRATION RATE: 12 BRPM | SYSTOLIC BLOOD PRESSURE: 114 MMHG | BODY MASS INDEX: 20.08 KG/M2 | TEMPERATURE: 97.3 F | DIASTOLIC BLOOD PRESSURE: 70 MMHG

## 2022-12-29 DIAGNOSIS — Z79.899 ENCOUNTER FOR LONG-TERM (CURRENT) USE OF HIGH-RISK MEDICATION: ICD-10-CM

## 2022-12-29 DIAGNOSIS — M54.16 LUMBAR RADICULOPATHY: ICD-10-CM

## 2022-12-29 DIAGNOSIS — G89.4 CHRONIC PAIN SYNDROME: Primary | ICD-10-CM

## 2022-12-29 DIAGNOSIS — M54.12 CERVICAL RADICULOPATHY: ICD-10-CM

## 2022-12-29 DIAGNOSIS — M51.36 DEGENERATION OF INTERVERTEBRAL DISC OF LUMBAR REGION: ICD-10-CM

## 2022-12-29 LAB
POC AMPHETAMINES: NEGATIVE
POC BARBITURATES: POSITIVE
POC BENZODIAZEPHINES: NEGATIVE
POC COCAINE: NEGATIVE
POC METHADONE: NEGATIVE
POC METHAMPHETAMINE SCREEN URINE: NEGATIVE
POC OPIATES: POSITIVE
POC OXYCODONE: NEGATIVE
POC PHENCYCLIDINE: NEGATIVE
POC PROPOXYPHENE: NEGATIVE
POC THC: NEGATIVE
POC TRICYCLIC ANTIDEPRESSANTS: NEGATIVE

## 2022-12-29 PROCEDURE — 80305 DRUG TEST PRSMV DIR OPT OBS: CPT

## 2022-12-29 PROCEDURE — 99214 OFFICE O/P EST MOD 30 MIN: CPT

## 2022-12-29 RX ORDER — AZELASTINE 1 MG/ML
0.1 SPRAY, METERED NASAL 2 TIMES DAILY
COMMUNITY
Start: 2022-12-28

## 2022-12-29 RX ORDER — HYDROCODONE BITARTRATE AND ACETAMINOPHEN 10; 325 MG/1; MG/1
1 TABLET ORAL EVERY 6 HOURS PRN
Qty: 120 TABLET | Refills: 0 | Status: SHIPPED | OUTPATIENT
Start: 2022-12-29 | End: 2023-01-25 | Stop reason: SDUPTHER

## 2022-12-29 NOTE — PROGRESS NOTES
CHIEF COMPLAINT  Back and neck     Subjective   Tanya Jimenes is a 82 y.o. male  who presents for follow-up.  He has a history of chronic low back pain. He reports that his pain has remained consistent since his last office visit.     Today pain is 8/10VAS in severity. Pain is located in his neck and low back. Describes this pain as a nearly continuous aching and shooting. Pain is worsened by prolonged position, bending/twisting, and walking long distances. Pain improves with rest/reposition and medications.     Continues with Hydrocodone 10mg 4/day and Gabapentin 800mg 2day. Denies any side effects from the regimen, including constipation and somnolence. The regimen helps decrease pain by a moderate amount. Notes improvement in activity and function with regimen. ADL's by self. Denies any bowel or bladder changes.     Patient is not interested in interventional/surgical procedures.     Cannot take NSAID's due to CKD.    Back Pain  This is a chronic problem. The current episode started more than 1 year ago. The problem occurs constantly. The problem has been waxing and waning since onset. The pain is present in the lumbar spine. The quality of the pain is described as aching and shooting. The pain radiates to the right foot, right knee and right thigh. The pain is at a severity of 8/10. The pain is severe. The pain is the same all the time. The symptoms are aggravated by standing, position, bending and twisting. Stiffness is present all day. Associated symptoms include weakness. Pertinent negatives include no abdominal pain, chest pain, dysuria, fever, headaches or numbness. He has tried analgesics (Gabapentin, Hydrocodone,) for the symptoms. The treatment provided moderate relief.   Neck Pain   This is a chronic problem. The problem occurs constantly. The problem has been waxing and waning. The pain is associated with an unknown factor. The pain is present in the left side, right side and midline. The quality of the  pain is described as aching and shooting. The pain is at a severity of 8/10. The symptoms are aggravated by position and bending. The pain is same all the time. Stiffness is present in the morning. Associated symptoms include weakness. Pertinent negatives include no chest pain, fever, headaches or numbness. He has tried neck support (Gabapentin, Hydrocodone,) for the symptoms. The treatment provided moderate relief.     PEG Assessment   What number best describes your pain on average in the past week?9  What number best describes how, during the past week, pain has interfered with your enjoyment of life?9  What number best describes how, during the past week, pain has interfered with your general activity?  9    Review of Pertinent Medical Data ---  DATE: 10/17/2022 1:58 PM     EXAMINATION: CT Spine Cervical WO.     PROVIDED INDICATION: Other (Please Specify): Neck trauma.     COMPARISON: MRI C-spine 09/13/2022     TECHNIQUE: Axial computed tomographic images from thoracic inlet through the skull base without intravenous contrast. Reformatted images include axial 1 mm, axial 3 mm, sagittal 3 mm, and coronal 3 mm.     FINDINGS:     Major Findings: There is no acute fracture or traumatic subluxation in the cervical spine.     Incidental and Normal Findings:     Soft tissues: There is no prevertebral fluid or edema.   Other: The partially visualized lung apices are clear. The visualized thyroid gland is homogeneous.   Degenerative changes: Bones are demineralized. Prominent degenerative changes are seen at C4-5, C5-6 and C6-7 levels with decreased disc height and large posterior osteophytes resulting in mild to moderate stenosis at C4-5 and C5-6 levels and more severe stenosis at the C6-7 level.     IMPRESSION: No acute fracture.     Dictated by: Steven Valente M.D. Signed by Steven Valente M.D. on 10/17/2022 2:19 PM   ##### Final #####      Lumbar Spine CT     ACCESSION NUMBER: [id number]   DATE: 10/17/2022  4:42 PM   PROVIDED INDICATION: Trauma.   COMPARISON: Source material CT. MRI of the lumbar spine dated 01/12/2022. Lumbar spine CT dated 08/15/2012.   TECHNIQUE: CT of the lumbar spine was reconstructed from data obtained during a CT of the chest, abdomen, and pelvis.     FINDINGS:   ACUTE FINDINGS: No acute fracture or traumatic subluxation.     SIGNIFICANT DEGENERATIVE CHANGES: Mild multilevel degenerative disc disease is present, most pronounced at the L1-2 and L5-S1 levels. There are small anterior marginal osteophytes throughout. Broad-based posterior disc bulges spanning L3-S1. There is grade 1 retrolisthesis of L4-L5 and L5 on S1. The remaining posterior alignments are normal. Mild multilevel facet arthropathy is present. These findings again result in significant neural foraminal narrowing on the right of the L4-5 and L5-S1 levels.     SOFT TISSUES: No acute abnormality.     IMPRESSION:   1.  No acute fracture or posttraumatic malalignment in the lumbar spine.     2.  Degenerative changes of the lumbar spine are likely not significantly progressed when compared to the prior MRI.     Dictated by: Jan Enriquez M.D. Signed by Jan Enriquez M.D. on 10/17/2022 5:04 PM   ##### Final #####     The following portions of the patient's history were reviewed and updated as appropriate: allergies, current medications, past family history, past medical history, past social history, past surgical history and problem list.    Review of Systems   Constitutional: Negative for activity change (less), fatigue and fever.   HENT: Positive for hearing loss ([place]). Negative for congestion.    Eyes: Negative for visual disturbance.   Respiratory: Negative for cough and chest tightness.    Cardiovascular: Negative for chest pain.   Gastrointestinal: Positive for constipation. Negative for abdominal pain and diarrhea.   Genitourinary: Negative for difficulty urinating and dysuria.   Musculoskeletal: Positive for back pain and  "neck pain.   Neurological: Positive for weakness. Negative for dizziness, light-headedness, numbness and headaches.   Psychiatric/Behavioral: Positive for sleep disturbance. Negative for agitation and suicidal ideas. The patient is nervous/anxious.      I have reviewed and confirmed the accuracy of the ROS as documented by the MA/LPN/RN TAYLER [place]    Vitals:    12/29/22 1406   BP: 114/70   BP Location: Right arm   Patient Position: Sitting   Cuff Size: Adult   Pulse: 78   Resp: 12   Temp: 97.3 °F (36.3 °C)   TempSrc: Temporal   SpO2: 100%   Weight: 61.5 kg (135 lb 9.6 oz)   Height: 175.3 cm (69.02\")   PainSc:   8   PainLoc: Comment: back,neck     Objective   Physical Exam  Constitutional:       Appearance: Normal appearance.   HENT:      Head: Normocephalic.   Cardiovascular:      Rate and Rhythm: Normal rate and regular rhythm.   Pulmonary:      Effort: Pulmonary effort is normal.      Breath sounds: Normal breath sounds.   Musculoskeletal:      Cervical back: Tenderness and bony tenderness present. Decreased range of motion.      Lumbar back: Tenderness and bony tenderness present. Decreased range of motion.   Skin:     General: Skin is warm and dry.      Capillary Refill: Capillary refill takes less than 2 seconds.   Neurological:      General: No focal deficit present.      Mental Status: He is alert and oriented to person, place, and time.   Psychiatric:         Mood and Affect: Mood normal.         Behavior: Behavior normal.         Thought Content: Thought content normal.         Cognition and Memory: Cognition normal.       Assessment & Plan   Diagnoses and all orders for this visit:    1. Chronic pain syndrome (Primary)  -     Urine Drug Screen Confirmation - Urine, Clean Catch; Future  -     POC Urine Drug Screen, Triage    2. Lumbar radiculopathy  -     Urine Drug Screen Confirmation - Urine, Clean Catch; Future  -     POC Urine Drug Screen, Triage  -     HYDROcodone-acetaminophen (NORCO) "  MG per tablet; Take 1 tablet by mouth Every 6 (Six) Hours As Needed for Moderate Pain. 30 day supply.  Dispense: 120 tablet; Refill: 0    3. Degeneration of intervertebral disc of lumbar region  -     Urine Drug Screen Confirmation - Urine, Clean Catch; Future  -     POC Urine Drug Screen, Triage    4. Cervical radiculopathy  -     Urine Drug Screen Confirmation - Urine, Clean Catch; Future  -     POC Urine Drug Screen, Triage    5. Encounter for long-term (current) use of high-risk medication  -     Urine Drug Screen Confirmation - Urine, Clean Catch; Future  -     POC Urine Drug Screen, Triage    --- Routine UDS in office today as part of monitoring requirements for controlled substances. The specimen was viewed and the immunoassay result reviewed and is +BAR and +OPI (appropriate). This specimen will be sent to [facility] for confirmation.     --- The patient signed an updated copy of the controlled substance agreement on 12/29/22  --- Narcan prescription current  --- Refill Hydrocodone. Patient appears stable with current regimen. No adverse effects. Regarding continuation of opioids, there is no evidence of aberrant behavior or any red flags.  The patient continues with appropriate response to opioid therapy. ADL's remain intact by self.   --- Follow-up 2 months or sooner if needed     SULEIMAN REPORT  As part of the patient's treatment plan, I am prescribing controlled substances. The patient has been made aware of appropriate use of such medications, including potential risk of somnolence, limited ability to drive and/or work safely, and the potential for dependence or overdose. It has also been made clear that these medications are for use by this patient only, without concomitant use of alcohol or other substances unless prescribed.     Patient has completed prescribing agreement detailing terms of continued prescribing of controlled substances, including monitoring SULEIMAN reports, urine  drug screening, and pill counts if necessary. The patient is aware that inappropriate use will results in cessation of prescribing such medications.    As the clinician, I personally reviewed the SULEIMAN from 12/29/22 while the patient was in the office today.    History and physical exam exhibit continued safe and appropriate use of controlled substances.    Dictated utilizing Dragon dictation.     Patient remained masked during entire encounter. No cough present. I donned a mask and eye protection throughout entire visit. Prior to donning mask and eye protection, hand hygiene was performed, as well as when it was doffed.  I was closer than 6 feet, but not for an extended period of time. No obvious exposure to any bodily fluids.

## 2023-01-25 DIAGNOSIS — M54.16 LUMBAR RADICULOPATHY: ICD-10-CM

## 2023-01-25 NOTE — TELEPHONE ENCOUNTER
Medication Refill Request    Date of phone call: 1/25/2022    Medication being requested: hydro/apap  mg sig: take 1 tab q 6 hrs prn  Qty: 120    Date of last visit: 12/29/2022    Date of last refill: 12/29/2022    SULEIMAN up to date?: yes    Next Follow up?: 2/28/2023    Any new pertinent information? (i.e, new medication allergies, new use of medications, change in patient's health or condition, non-compliance or inconsistency with prescribing agreement?):

## 2023-01-26 RX ORDER — HYDROCODONE BITARTRATE AND ACETAMINOPHEN 10; 325 MG/1; MG/1
1 TABLET ORAL EVERY 6 HOURS PRN
Qty: 120 TABLET | Refills: 0 | Status: SHIPPED | OUTPATIENT
Start: 2023-01-26 | End: 2023-03-01 | Stop reason: SDUPTHER

## 2023-02-22 DIAGNOSIS — M54.16 LUMBAR RADICULOPATHY: Primary | ICD-10-CM

## 2023-02-23 RX ORDER — GABAPENTIN 800 MG/1
TABLET ORAL
Qty: 180 TABLET | Refills: 0 | Status: SHIPPED | OUTPATIENT
Start: 2023-02-23 | End: 2023-03-09 | Stop reason: SDUPTHER

## 2023-03-01 DIAGNOSIS — M54.16 LUMBAR RADICULOPATHY: ICD-10-CM

## 2023-03-01 NOTE — TELEPHONE ENCOUNTER
Medication Refill Request    Date of phone call: 3/1/2023    Medication being requested: hydro/apap  mg sig: take 1 tab q 6 hrs prn  Qty: 120    Date of last visit: 12/29/2022    Date of last refill: 1/28/2023    SULEIMAN up to date?: yes    Next Follow up?: 3/9/2023    Any new pertinent information? (i.e, new medication allergies, new use of medications, change in patient's health or condition, non-compliance or inconsistency with prescribing agreement?):

## 2023-03-02 RX ORDER — HYDROCODONE BITARTRATE AND ACETAMINOPHEN 10; 325 MG/1; MG/1
1 TABLET ORAL EVERY 6 HOURS PRN
Qty: 120 TABLET | Refills: 0 | Status: SHIPPED | OUTPATIENT
Start: 2023-03-02 | End: 2023-03-30 | Stop reason: SDUPTHER

## 2023-03-09 ENCOUNTER — OFFICE VISIT (OUTPATIENT)
Dept: PAIN MEDICINE | Facility: CLINIC | Age: 83
End: 2023-03-09
Payer: MEDICARE

## 2023-03-09 VITALS
WEIGHT: 135.4 LBS | BODY MASS INDEX: 20.06 KG/M2 | DIASTOLIC BLOOD PRESSURE: 65 MMHG | HEIGHT: 69 IN | HEART RATE: 75 BPM | SYSTOLIC BLOOD PRESSURE: 121 MMHG | TEMPERATURE: 97.3 F | RESPIRATION RATE: 20 BRPM | OXYGEN SATURATION: 98 %

## 2023-03-09 DIAGNOSIS — Z79.899 ENCOUNTER FOR LONG-TERM (CURRENT) USE OF HIGH-RISK MEDICATION: ICD-10-CM

## 2023-03-09 DIAGNOSIS — G89.4 CHRONIC PAIN SYNDROME: ICD-10-CM

## 2023-03-09 DIAGNOSIS — M51.36 DEGENERATION OF INTERVERTEBRAL DISC OF LUMBAR REGION: ICD-10-CM

## 2023-03-09 DIAGNOSIS — M54.16 LUMBAR RADICULOPATHY: Primary | ICD-10-CM

## 2023-03-09 DIAGNOSIS — M54.12 CERVICAL RADICULOPATHY: ICD-10-CM

## 2023-03-09 PROCEDURE — 99214 OFFICE O/P EST MOD 30 MIN: CPT

## 2023-03-09 RX ORDER — GABAPENTIN 800 MG/1
800 TABLET ORAL 2 TIMES DAILY
Qty: 180 TABLET | Refills: 0 | Status: SHIPPED | OUTPATIENT
Start: 2023-03-09

## 2023-03-09 NOTE — PROGRESS NOTES
CHIEF COMPLAINT  Back and neck pain     Subjective   Tanya Jimenes is a 82 y.o. male  who presents for follow-up.  He has a history of chronic neck and low back pain. He repots that his back pain has increased since his last office visit. Neck pain remains consistent.     Today pain is 8/10VAS in severity. Pain is located in his neck and low back. Describes this pain as a nearly continuous aching and shooting. Pain is worsened by prolonged position, bending/twisting, and walking long distances. Pain improves with rest/reposition and medications.      Continues with Hydrocodone 10mg 4/day and Gabapentin 800mg 2day. Denies any side effects from the regimen, including constipation and somnolence. The regimen helps decrease pain by a moderate amount. Notes improvement in activity and function with regimen. ADL's by self. Denies any bowel or bladder changes.      Patient is not interested in interventional/surgical procedures.      Cannot take NSAID's due to CKD.    Patient states he was recently referred back to physical therapy for balance or strengthening exercises    Back Pain  This is a chronic problem. The current episode started more than 1 year ago. The problem occurs constantly. The problem has been gradually worsening since onset. The pain is present in the lumbar spine. The quality of the pain is described as aching and shooting. The pain radiates to the right foot, right knee and right thigh. The pain is at a severity of 8/10. The pain is severe. The pain is the same all the time. The symptoms are aggravated by standing, position, bending and twisting. Stiffness is present all day. Associated symptoms include numbness (left leg) and weakness. Pertinent negatives include no abdominal pain, chest pain, dysuria, fever or headaches. He has tried analgesics (Gabapentin, Hydrocodone,) for the symptoms. The treatment provided moderate relief.   Neck Pain   This is a chronic problem. The problem occurs constantly. The  problem has been waxing and waning. The pain is associated with an unknown factor. The pain is present in the left side, right side and midline. The quality of the pain is described as aching and shooting. The pain is at a severity of 5/10. The symptoms are aggravated by position and bending. The pain is same all the time. Stiffness is present in the morning. Associated symptoms include numbness (left leg) and weakness. Pertinent negatives include no chest pain, fever or headaches. He has tried neck support (Gabapentin, Hydrocodone,) for the symptoms. The treatment provided moderate relief.      PEG Assessment   What number best describes your pain on average in the past week?9  What number best describes how, during the past week, pain has interfered with your enjoyment of life?10  What number best describes how, during the past week, pain has interfered with your general activity?  10    The following portions of the patient's history were reviewed and updated as appropriate: allergies, current medications, past family history, past medical history, past social history, past surgical history and problem list.    Review of Systems   Constitutional: Positive for fatigue. Negative for activity change (less) and fever.   HENT: Negative for congestion.    Eyes: Negative for visual disturbance.   Respiratory: Negative for cough and chest tightness.    Cardiovascular: Negative for chest pain.   Gastrointestinal: Positive for constipation. Negative for abdominal pain and diarrhea.   Genitourinary: Negative for difficulty urinating and dysuria.   Musculoskeletal: Positive for back pain and neck pain.   Neurological: Positive for dizziness, weakness and numbness (left leg). Negative for light-headedness and headaches.   Psychiatric/Behavioral: Positive for agitation and sleep disturbance. Negative for suicidal ideas. The patient is nervous/anxious.      I have reviewed and confirmed the accuracy of the ROS as documented by  "the MA/LPN/RN Candice Sparks, APRN    Vitals:    03/09/23 1258   BP: 121/65   BP Location: Right arm   Patient Position: Sitting   Cuff Size: Adult   Pulse: 75   Resp: 20   Temp: 97.3 °F (36.3 °C)   TempSrc: Temporal   SpO2: 98%   Weight: 61.4 kg (135 lb 6.4 oz)   Height: 175.3 cm (69.02\")   PainSc:   8     Objective   Physical Exam  Constitutional:       Appearance: Normal appearance.   HENT:      Head: Normocephalic.   Cardiovascular:      Rate and Rhythm: Normal rate and regular rhythm.   Pulmonary:      Effort: Pulmonary effort is normal.      Breath sounds: Normal breath sounds.   Musculoskeletal:      Cervical back: Tenderness and bony tenderness present. Decreased range of motion.      Lumbar back: Tenderness and bony tenderness present. Decreased range of motion.   Skin:     General: Skin is warm and dry.      Capillary Refill: Capillary refill takes less than 2 seconds.   Neurological:      General: No focal deficit present.      Mental Status: He is alert and oriented to person, place, and time.      Gait: Gait abnormal (ambulates with cane).   Psychiatric:         Mood and Affect: Mood normal.         Behavior: Behavior normal.         Thought Content: Thought content normal.         Cognition and Memory: Cognition normal.       Assessment & Plan   Diagnoses and all orders for this visit:    1. Lumbar radiculopathy (Primary)  -     gabapentin (NEURONTIN) 800 MG tablet; Take 1 tablet by mouth 2 (Two) Times a Day.  Dispense: 180 tablet; Refill: 0    2. Chronic pain syndrome    3. Degeneration of intervertebral disc of lumbar region    4. Cervical radiculopathy    5. Encounter for long-term (current) use of high-risk medication    --- The urine drug screen confirmation from 12/29/22 has been reviewed and the result is appropriate based on patient history and SULEIMAN report  --- The patient signed an updated copy of the controlled substance agreement on 12/29/22  --- Continue Gabapentin - refills sent " pharmacy  --- Continue Hydrocodone. No refill needed at this time. Patient appears stable with current regimen. No adverse effects. Regarding continuation of opioids, there is no evidence of aberrant behavior or any red flags.  The patient continues with appropriate response to opioid therapy. ADL's remain intact by self.   --- Encouraged patient to start physical therapy   --- Follow-up 2 months or sooner if needed     SULEIMAN REPORT  As part of the patient's treatment plan, I am prescribing controlled substances. The patient has been made aware of appropriate use of such medications, including potential risk of somnolence, limited ability to drive and/or work safely, and the potential for dependence or overdose. It has also been made clear that these medications are for use by this patient only, without concomitant use of alcohol or other substances unless prescribed.     Patient has completed prescribing agreement detailing terms of continued prescribing of controlled substances, including monitoring SULEIMAN reports, urine drug screening, and pill counts if necessary. The patient is aware that inappropriate use will results in cessation of prescribing such medications.    As the clinician, I personally reviewed the SULEIMAN from 3/9/23 while the patient was in the office today.    History and physical exam exhibit continued safe and appropriate use of controlled substances.    Dictated utilizing Dragon dictation.     Patient remained masked during entire encounter. No cough present. I donned a mask and eye protection throughout entire visit. Prior to donning mask and eye protection, hand hygiene was performed, as well as when it was doffed.  I was closer than 6 feet, but not for an extended period of time. No obvious exposure to any bodily fluids.

## 2023-03-30 DIAGNOSIS — M54.16 LUMBAR RADICULOPATHY: ICD-10-CM

## 2023-03-30 RX ORDER — HYDROCODONE BITARTRATE AND ACETAMINOPHEN 10; 325 MG/1; MG/1
1 TABLET ORAL EVERY 6 HOURS PRN
Qty: 120 TABLET | Refills: 0 | Status: SHIPPED | OUTPATIENT
Start: 2023-03-30

## 2023-03-30 NOTE — TELEPHONE ENCOUNTER
Medication Refill Request    Date of phone call: 3/30/2023    Medication being requested: hydro/apap  mg sig: take 1 tab q 6 hrs prn  Qty: 120    Date of last visit: 3/9/2023    Date of last refill: 3/2/2023    SULEIMAN up to date?: yes    Next Follow up?: 5/9/2023    Any new pertinent information? (i.e, new medication allergies, new use of medications, change in patient's health or condition, non-compliance or inconsistency with prescribing agreement?):

## 2023-04-24 DIAGNOSIS — M54.16 LUMBAR RADICULOPATHY: ICD-10-CM

## 2023-04-24 RX ORDER — HYDROCODONE BITARTRATE AND ACETAMINOPHEN 10; 325 MG/1; MG/1
1 TABLET ORAL EVERY 6 HOURS PRN
Qty: 120 TABLET | Refills: 0 | Status: SHIPPED | OUTPATIENT
Start: 2023-04-24

## 2023-04-24 NOTE — TELEPHONE ENCOUNTER
Medication Refill Request    Date of phone call: 4/24/2023    Medication being requested: hydro/apap  mg sig: take 1 tab q 6 hrs prn  Qty: 120    Date of last visit: 3/9/2023    Date of last refill: 4/1/2023    SULEIMAN up to date?: yes    Next Follow up?: 5/9/2023    Any new pertinent information? (i.e, new medication allergies, new use of medications, change in patient's health or condition, non-compliance or inconsistency with prescribing agreement?):

## 2023-05-09 ENCOUNTER — OFFICE VISIT (OUTPATIENT)
Dept: PAIN MEDICINE | Facility: CLINIC | Age: 83
End: 2023-05-09
Payer: MEDICARE

## 2023-05-09 VITALS
HEIGHT: 69 IN | RESPIRATION RATE: 12 BRPM | TEMPERATURE: 97.7 F | HEART RATE: 92 BPM | DIASTOLIC BLOOD PRESSURE: 64 MMHG | SYSTOLIC BLOOD PRESSURE: 106 MMHG | OXYGEN SATURATION: 98 % | WEIGHT: 130.4 LBS | BODY MASS INDEX: 19.31 KG/M2

## 2023-05-09 DIAGNOSIS — M51.36 DEGENERATION OF INTERVERTEBRAL DISC OF LUMBAR REGION: ICD-10-CM

## 2023-05-09 DIAGNOSIS — M54.16 LUMBAR RADICULOPATHY: Primary | ICD-10-CM

## 2023-05-09 DIAGNOSIS — G89.4 CHRONIC PAIN SYNDROME: ICD-10-CM

## 2023-05-09 DIAGNOSIS — Z79.899 ENCOUNTER FOR LONG-TERM (CURRENT) USE OF HIGH-RISK MEDICATION: ICD-10-CM

## 2023-05-09 DIAGNOSIS — M54.12 CERVICAL RADICULOPATHY: ICD-10-CM

## 2023-05-09 RX ORDER — HYDROCODONE BITARTRATE AND ACETAMINOPHEN 10; 325 MG/1; MG/1
1 TABLET ORAL EVERY 6 HOURS PRN
Qty: 120 TABLET | Refills: 0 | Status: SHIPPED | OUTPATIENT
Start: 2023-05-09

## 2023-05-09 NOTE — PROGRESS NOTES
CHIEF COMPLAINT  Back and neck pain     Subjective   Tanya Jimenes is a 83 y.o. male  who presents for follow-up.  He has a history of chronic neck and low back pain. He repots that his pain level has fluctuated since his last office visit.      Today pain is 6/10VAS in severity. Pain is located in his neck and low back. Describes this pain as a nearly continuous aching and shooting. Pain is worsened by prolonged position, bending/twisting, and walking long distances. Pain improves with rest/reposition and medications.      Continues with Hydrocodone 10mg 4/day and Gabapentin 800mg 2day. He reports intermittent constipation that he managed with increased fiber intake. Denies any other side effects from the regimen including somnolence. The regimen helps decrease pain by a moderate amount. Notes improvement in activity and function with regimen. ADL's by self. Denies any bowel or bladder changes.     He is currently going to PT twice a week. He reports that this has been helpful for strengthening his legs and balance.      Patient is not interested in interventional/surgical procedures.      Cannot take NSAID's due to CKD.    Back Pain  This is a chronic problem. The current episode started more than 1 year ago. The problem occurs constantly. The problem has been waxing and waning since onset. The pain is present in the lumbar spine. The quality of the pain is described as aching and shooting. The pain radiates to the right foot, right knee and right thigh. The pain is at a severity of 6/10. The pain is severe. The pain is the same all the time. The symptoms are aggravated by standing, position, bending and twisting. Stiffness is present all day. Associated symptoms include weakness. Pertinent negatives include no abdominal pain, chest pain, dysuria, fever, headaches or numbness. He has tried analgesics (Gabapentin, Hydrocodone,) for the symptoms. The treatment provided moderate relief.   Neck Pain   This is a chronic  problem. The problem occurs constantly. The problem has been waxing and waning. The pain is associated with an unknown factor. The pain is present in the left side, right side and midline. The quality of the pain is described as aching and shooting. The pain is at a severity of 6/10. The symptoms are aggravated by position and bending. The pain is same all the time. Stiffness is present in the morning. Associated symptoms include weakness. Pertinent negatives include no chest pain, fever, headaches or numbness. He has tried neck support (Gabapentin, Hydrocodone,) for the symptoms. The treatment provided moderate relief.      PEG Assessment   What number best describes your pain on average in the past week?8  What number best describes how, during the past week, pain has interfered with your enjoyment of life?8  What number best describes how, during the past week, pain has interfered with your general activity?  8    The following portions of the patient's history were reviewed and updated as appropriate: allergies, current medications, past family history, past medical history, past social history, past surgical history and problem list.    Review of Systems   Constitutional: Positive for activity change. Negative for fatigue and fever.   HENT: Negative for congestion.    Eyes: Negative for visual disturbance.   Respiratory: Negative for cough and chest tightness.    Cardiovascular: Negative for chest pain.   Gastrointestinal: Positive for constipation. Negative for abdominal pain and diarrhea.   Genitourinary: Negative for difficulty urinating and dysuria.   Musculoskeletal: Positive for back pain and neck pain.   Neurological: Positive for weakness. Negative for dizziness, light-headedness, numbness and headaches.   Psychiatric/Behavioral: Negative for agitation, sleep disturbance and suicidal ideas. The patient is nervous/anxious.      I have reviewed and confirmed the accuracy of the ROS as documented by the  "MA/CHU/ANIL Paezgenesis Sparks, APRN    Vitals:    05/09/23 1516   BP: 106/64   BP Location: Right arm   Patient Position: Sitting   Cuff Size: Adult   Pulse: 92   Resp: 12   Temp: 97.7 °F (36.5 °C)   TempSrc: Temporal   SpO2: 98%   Weight: 59.1 kg (130 lb 6.4 oz)   Height: 175.3 cm (69.02\")   PainSc:   6     Objective   Physical Exam  Constitutional:       Appearance: Normal appearance.   HENT:      Head: Normocephalic.   Cardiovascular:      Rate and Rhythm: Normal rate and regular rhythm.   Pulmonary:      Effort: Pulmonary effort is normal.      Breath sounds: Normal breath sounds.   Musculoskeletal:      Cervical back: Tenderness and bony tenderness present. Decreased range of motion.      Lumbar back: Tenderness and bony tenderness present. Decreased range of motion.   Skin:     General: Skin is warm and dry.      Capillary Refill: Capillary refill takes less than 2 seconds.   Neurological:      General: No focal deficit present.      Mental Status: He is alert and oriented to person, place, and time.      Gait: Gait abnormal (ambulates with cane).   Psychiatric:         Mood and Affect: Mood normal.         Behavior: Behavior normal.         Thought Content: Thought content normal.         Cognition and Memory: Cognition normal.       Assessment & Plan   Diagnoses and all orders for this visit:    1. Lumbar radiculopathy (Primary)  -     HYDROcodone-acetaminophen (NORCO)  MG per tablet; Take 1 tablet by mouth Every 6 (Six) Hours As Needed for Moderate Pain. 30 day supply.  Dispense: 120 tablet; Refill: 0  -     Urine Drug Screen Confirmation - Urine, Clean Catch; Future  -     POC Urine Drug Screen, Triage    2. Chronic pain syndrome  -     Urine Drug Screen Confirmation - Urine, Clean Catch; Future  -     POC Urine Drug Screen, Triage    3. Degeneration of intervertebral disc of lumbar region  -     Urine Drug Screen Confirmation - Urine, Clean Catch; Future  -     POC Urine Drug Screen, Triage    4. " Cervical radiculopathy  -     Urine Drug Screen Confirmation - Urine, Clean Catch; Future  -     POC Urine Drug Screen, Triage    5. Encounter for long-term (current) use of high-risk medication  -     Urine Drug Screen Confirmation - Urine, Clean Catch; Future  -     POC Urine Drug Screen, Triage    --- Routine UDS in office today as part of monitoring requirements for controlled substances.  The specimen was viewed and the immunoassay result reviewed and is +OPI & +BAR.  This specimen will be sent to [facility] for confirmation.     --- The patient signed an updated copy of the controlled substance agreement on 12/29/22  --- Continue with PT as tolerated   --- Continue Gabapentin. Patient reports no refill needed at this time.   --- Refill Hydrocodone. DNF 5/29/23. Patient appears stable with current regimen. No adverse effects. Regarding continuation of opioids, there is no evidence of aberrant behavior or any red flags.  The patient continues with appropriate response to opioid therapy. ADL's remain intact by self.   --- Follow-up 2 months or sooner     SULEIMAN REPORT  As part of the patient's treatment plan, I am prescribing controlled substances. The patient has been made aware of appropriate use of such medications, including potential risk of somnolence, limited ability to drive and/or work safely, and the potential for dependence or overdose. It has also been made clear that these medications are for use by this patient only, without concomitant use of alcohol or other substances unless prescribed.     Patient has completed prescribing agreement detailing terms of continued prescribing of controlled substances, including monitoring SULEIMAN reports, urine drug screening, and pill counts if necessary. The patient is aware that inappropriate use will results in cessation of prescribing such medications.    As the clinician, I personally reviewed the SULEIMAN from 5/9/23 while the patient was in the  office today.    History and physical exam exhibit continued safe and appropriate use of controlled substances.    Dictated utilizing Dragon dictation.

## 2023-07-25 DIAGNOSIS — M54.16 LUMBAR RADICULOPATHY: ICD-10-CM

## 2023-07-25 RX ORDER — HYDROCODONE BITARTRATE AND ACETAMINOPHEN 10; 325 MG/1; MG/1
1 TABLET ORAL EVERY 6 HOURS PRN
Qty: 120 TABLET | Refills: 0 | Status: SHIPPED | OUTPATIENT
Start: 2023-07-25

## 2023-07-25 NOTE — TELEPHONE ENCOUNTER
Medication Refill Request    Date of phone call: 23    Medication being requested: Norco  mg si tab po q 6 hrs prn  Qty: 120     Date of last visit: 7/10/23    Date of last refill:     SULEIMAN up to date?:     Next Follow up?: 23    Any new pertinent information? (i.e, new medication allergies, new use of medications, change in patient's health or condition, non-compliance or inconsistency with prescribing agreement?):

## 2023-08-23 DIAGNOSIS — M54.16 LUMBAR RADICULOPATHY: ICD-10-CM

## 2023-08-23 NOTE — TELEPHONE ENCOUNTER
Medication Refill Request    Date of phone call: 8/23/2023    Medication being requested: hydro/apap  mg sig: take 1 tab q 6 hrs prn  Qty: 120    Date of last visit: 7/10/2023    Date of last refill: 7/28/2023    SULEIMAN up to date?: yes    Next Follow up?: 9/11/2023    Any new pertinent information? (i.e, new medication allergies, new use of medications, change in patient's health or condition, non-compliance or inconsistency with prescribing agreement?): Also needs a refill on Gabapentin.

## 2023-08-24 RX ORDER — HYDROCODONE BITARTRATE AND ACETAMINOPHEN 10; 325 MG/1; MG/1
1 TABLET ORAL EVERY 6 HOURS PRN
Qty: 120 TABLET | Refills: 0 | Status: SHIPPED | OUTPATIENT
Start: 2023-08-24

## 2023-08-24 RX ORDER — GABAPENTIN 800 MG/1
800 TABLET ORAL 2 TIMES DAILY
Qty: 180 TABLET | Refills: 1 | Status: SHIPPED | OUTPATIENT
Start: 2023-08-24

## 2023-08-24 NOTE — TELEPHONE ENCOUNTER
Reviewed UDS and SULEIMAN. Both updated and appropriate. Refill appropriate. July refill was to be filled on 7/28/23 but was refilled by pharmacy on 7/26/23. DNF 8/27/23 to align further refills.

## 2023-09-11 ENCOUNTER — OFFICE VISIT (OUTPATIENT)
Dept: PAIN MEDICINE | Facility: CLINIC | Age: 83
End: 2023-09-11
Payer: MEDICARE

## 2023-09-11 VITALS
WEIGHT: 132.6 LBS | HEIGHT: 69 IN | RESPIRATION RATE: 12 BRPM | OXYGEN SATURATION: 96 % | BODY MASS INDEX: 19.64 KG/M2 | SYSTOLIC BLOOD PRESSURE: 112 MMHG | TEMPERATURE: 96.6 F | HEART RATE: 82 BPM | DIASTOLIC BLOOD PRESSURE: 60 MMHG

## 2023-09-11 DIAGNOSIS — Z79.899 ENCOUNTER FOR LONG-TERM (CURRENT) USE OF HIGH-RISK MEDICATION: ICD-10-CM

## 2023-09-11 DIAGNOSIS — M54.12 CERVICAL RADICULOPATHY: ICD-10-CM

## 2023-09-11 DIAGNOSIS — G89.4 CHRONIC PAIN SYNDROME: ICD-10-CM

## 2023-09-11 DIAGNOSIS — M54.16 LUMBAR RADICULOPATHY: Primary | ICD-10-CM

## 2023-09-11 DIAGNOSIS — M51.36 DEGENERATION OF INTERVERTEBRAL DISC OF LUMBAR REGION: ICD-10-CM

## 2023-09-11 PROCEDURE — 99214 OFFICE O/P EST MOD 30 MIN: CPT

## 2023-09-11 PROCEDURE — 1160F RVW MEDS BY RX/DR IN RCRD: CPT

## 2023-09-11 PROCEDURE — 1125F AMNT PAIN NOTED PAIN PRSNT: CPT

## 2023-09-11 PROCEDURE — 1159F MED LIST DOCD IN RCRD: CPT

## 2023-09-11 RX ORDER — HYDROCODONE BITARTRATE AND ACETAMINOPHEN 10; 325 MG/1; MG/1
1 TABLET ORAL EVERY 6 HOURS PRN
Qty: 120 TABLET | Refills: 0 | Status: SHIPPED | OUTPATIENT
Start: 2023-09-11

## 2023-09-11 NOTE — PROGRESS NOTES
CHIEF COMPLAINT  Back and neck pain    Subjective   Tanya Jimenes is a 83 y.o. male  who presents for follow-up.  He has a history of chronic neck and low back pain. He repots that his pain level has remained consistent since his last office visit and varies based on activity level.      Today pain is 6/10VAS in severity. Pain is located in his neck and low back. Describes this pain as a nearly continuous aching and shooting. Pain is worsened by prolonged position, bending/twisting, and walking long distances. Pain improves with rest/reposition and medications. He has completed PT since his last office visit with minimal relief.      Continues with Hydrocodone 10mg 4/day and Gabapentin 800mg 2/day. He reports intermittent constipation that he manages with increased fiber and water intake. Denies any other side effects from the regimen including somnolence. The regimen helps decrease pain by a moderate amount. Notes improvement in activity and function with regimen. ADL's by self. Denies any bowel or bladder changes.      Patient is not interested in interventional/surgical procedures.      Cannot take NSAID's due to CKD.    Back Pain  This is a chronic problem. The current episode started more than 1 year ago. The problem occurs constantly. The problem has been waxing and waning since onset. The pain is present in the lumbar spine. The quality of the pain is described as aching and shooting. The pain radiates to the right foot, right knee and right thigh. The pain is at a severity of 6/10. The pain is severe. The pain is The same all the time. The symptoms are aggravated by standing, position, bending and twisting. Stiffness is present All day. Associated symptoms include weakness. Pertinent negatives include no abdominal pain, chest pain, dysuria, fever, headaches or numbness. He has tried analgesics (Gabapentin, Hydrocodone,) for the symptoms. The treatment provided moderate relief.   Neck Pain   This is a chronic  problem. The problem occurs constantly. The problem has been waxing and waning. The pain is associated with an unknown factor. The pain is present in the left side, right side and midline. The quality of the pain is described as aching and shooting. The pain is at a severity of 6/10. The symptoms are aggravated by position and bending. The pain is Same all the time. Stiffness is present In the morning. Associated symptoms include weakness. Pertinent negatives include no chest pain, fever, headaches or numbness. He has tried neck support (Gabapentin, Hydrocodone,) for the symptoms. The treatment provided moderate relief.      PEG Assessment   What number best describes your pain on average in the past week?9  What number best describes how, during the past week, pain has interfered with your enjoyment of life?9  What number best describes how, during the past week, pain has interfered with your general activity?  9    The following portions of the patient's history were reviewed and updated as appropriate: allergies, current medications, past family history, past medical history, past social history, past surgical history, and problem list.    Review of Systems   Constitutional:  Negative for activity change (less), fatigue and fever.   Respiratory:  Negative for cough and chest tightness.    Cardiovascular:  Negative for chest pain.   Gastrointestinal:  Positive for constipation. Negative for abdominal pain and diarrhea.   Genitourinary:  Negative for difficulty urinating and dysuria.   Musculoskeletal:  Positive for back pain and neck pain.   Neurological:  Positive for weakness. Negative for dizziness, light-headedness, numbness and headaches.   Psychiatric/Behavioral:  Positive for sleep disturbance. Negative for agitation and suicidal ideas. The patient is not nervous/anxious.      I have reviewed and confirmed the accuracy of the ROS as documented by the MA/LPN/RN TAYLER Washington    Vitals:    09/11/23  "1406 09/11/23 1417   BP: 110/60 112/60   BP Location: Left arm    Patient Position: Sitting    Cuff Size: Adult    Pulse: 82    Resp: 12    Temp: 96.6 °F (35.9 °C)    TempSrc: Temporal    SpO2: 96%    Weight: 60.1 kg (132 lb 9.6 oz)    Height: 175.3 cm (69.02\")    PainSc:   6      Objective   Physical Exam  Constitutional:       Appearance: Normal appearance.   HENT:      Head: Normocephalic.   Cardiovascular:      Rate and Rhythm: Normal rate and regular rhythm.   Pulmonary:      Effort: Pulmonary effort is normal.      Breath sounds: Normal breath sounds.   Musculoskeletal:      Cervical back: Tenderness and bony tenderness present. Decreased range of motion.      Lumbar back: Tenderness and bony tenderness present. Decreased range of motion.   Skin:     General: Skin is warm and dry.      Capillary Refill: Capillary refill takes less than 2 seconds.   Neurological:      General: No focal deficit present.      Mental Status: He is alert and oriented to person, place, and time.      Gait: Gait abnormal (slowed).   Psychiatric:         Mood and Affect: Mood normal.         Behavior: Behavior normal.         Thought Content: Thought content normal.         Cognition and Memory: Cognition normal.     Assessment & Plan   Diagnoses and all orders for this visit:    1. Lumbar radiculopathy (Primary)  -     HYDROcodone-acetaminophen (NORCO)  MG per tablet; Take 1 tablet by mouth Every 6 (Six) Hours As Needed for Moderate Pain. 30 day supply.  Dispense: 120 tablet; Refill: 0    2. Chronic pain syndrome    3. Degeneration of intervertebral disc of lumbar region    4. Cervical radiculopathy    5. Encounter for long-term (current) use of high-risk medication    --- The urine drug screen confirmation from 5/9/23 has been reviewed and the result is appropriate based on patient history and SULEIMAN report  --- The patient signed an updated copy of the controlled substance agreement on 5/9/23  --- Continue Gabapentin. No " refill needed at this time.   --- Continue Hydrocodone. DNF 9/24/23. Patient appears stable with current regimen. No adverse effects. Regarding continuation of opioids, there is no evidence of aberrant behavior or any red flags.  The patient continues with appropriate response to opioid therapy. ADL's remain intact by self.   --- Follow-up 2 months or sooner if needed    Tanya Jimenes reports a pain score of 6.  Given his pain assessment as noted, treatment options were discussed and the following options were decided upon as a follow-up plan to address the patient's pain: continuation of current treatment plan for pain and prescription for opiod analgesics.       SULEIMAN REPORT  As part of the patient's treatment plan, I am prescribing controlled substances. The patient has been made aware of appropriate use of such medications, including potential risk of somnolence, limited ability to drive and/or work safely, and the potential for dependence or overdose. It has also been made clear that these medications are for use by this patient only, without concomitant use of alcohol or other substances unless prescribed.     Patient has completed prescribing agreement detailing terms of continued prescribing of controlled substances, including monitoring SULEIMAN reports, urine drug screening, and pill counts if necessary. The patient is aware that inappropriate use will results in cessation of prescribing such medications.    As the clinician, I personally reviewed the SULEIMAN from 9/11/23 while the patient was in the office today.  9  History and physical exam exhibit continued safe and appropriate use of controlled substances.    Dictated utilizing Dragon dictation.

## 2023-10-24 DIAGNOSIS — M54.16 LUMBAR RADICULOPATHY: ICD-10-CM

## 2023-10-24 RX ORDER — HYDROCODONE BITARTRATE AND ACETAMINOPHEN 10; 325 MG/1; MG/1
1 TABLET ORAL EVERY 6 HOURS PRN
Qty: 120 TABLET | Refills: 0 | Status: SHIPPED | OUTPATIENT
Start: 2023-10-24

## 2023-10-24 NOTE — TELEPHONE ENCOUNTER
Rx Refill Note  Requested Prescriptions     Pending Prescriptions Disp Refills    HYDROcodone-acetaminophen (NORCO)  MG per tablet 120 tablet 0     Sig: Take 1 tablet by mouth Every 6 (Six) Hours As Needed for Moderate Pain. 30 day supply.      Last office visit with prescribing clinician: 9/11/2023   Last telemedicine visit with prescribing clinician: Visit date not found   Next office visit with prescribing clinician: 11/10/2023                         Would you like a call back once the refill request has been completed: [] Yes [] No    If the office needs to give you a call back, can they leave a voicemail: [] Yes [] No    Puneet Hoover CMA  10/24/23, 09:59 EDT

## 2023-10-31 DIAGNOSIS — G89.4 CHRONIC PAIN SYNDROME: ICD-10-CM

## 2023-10-31 DIAGNOSIS — M54.16 LUMBAR RADICULOPATHY: Primary | ICD-10-CM

## 2023-10-31 RX ORDER — OXYCODONE AND ACETAMINOPHEN 7.5; 325 MG/1; MG/1
1 TABLET ORAL EVERY 6 HOURS PRN
Qty: 120 TABLET | Refills: 0 | Status: SHIPPED | OUTPATIENT
Start: 2023-10-31

## 2023-10-31 NOTE — TELEPHONE ENCOUNTER
Opioid rotation due to pharmacy availability. Reviewed NAE and SULEIMAN. Both updated and appropriate. Refill appropriate. Are you ok with prescribing Oxycodone 7.5mg 4/day? If so, can you please send to his pharmacy. Please adjust prescription as you see fit. Thanks.

## 2023-10-31 NOTE — TELEPHONE ENCOUNTER
Mr. Salazar [facility] pharmacy is currently out of his hydro/apap  mg. They don't have any doses of hydro/apap in stock currently. I was informed that they do have oxy/apap 7.5-325 mg and 5-325 mg in stock. The pharmacist Patito asked if his Rx could be switched to the oxy/apap? I already canceled the hydro/apap  mg since they don't have the medication.

## 2023-11-30 ENCOUNTER — OFFICE VISIT (OUTPATIENT)
Dept: PAIN MEDICINE | Facility: CLINIC | Age: 83
End: 2023-11-30
Payer: MEDICARE

## 2023-11-30 VITALS
SYSTOLIC BLOOD PRESSURE: 120 MMHG | TEMPERATURE: 98 F | DIASTOLIC BLOOD PRESSURE: 78 MMHG | HEART RATE: 83 BPM | WEIGHT: 132.4 LBS | HEIGHT: 69 IN | OXYGEN SATURATION: 97 % | BODY MASS INDEX: 19.61 KG/M2 | RESPIRATION RATE: 18 BRPM

## 2023-11-30 DIAGNOSIS — M51.36 DEGENERATION OF INTERVERTEBRAL DISC OF LUMBAR REGION: ICD-10-CM

## 2023-11-30 DIAGNOSIS — M54.16 LUMBAR RADICULOPATHY: ICD-10-CM

## 2023-11-30 DIAGNOSIS — M54.12 CERVICAL RADICULOPATHY: Primary | ICD-10-CM

## 2023-11-30 DIAGNOSIS — Z79.899 ENCOUNTER FOR LONG-TERM (CURRENT) USE OF HIGH-RISK MEDICATION: ICD-10-CM

## 2023-11-30 DIAGNOSIS — G89.4 CHRONIC PAIN SYNDROME: ICD-10-CM

## 2023-11-30 LAB
POC AMPHETAMINES: NEGATIVE
POC BARBITURATES: POSITIVE
POC BENZODIAZEPHINES: NEGATIVE
POC COCAINE: NEGATIVE
POC METHADONE: NEGATIVE
POC METHAMPHETAMINE SCREEN URINE: NEGATIVE
POC OPIATES: NEGATIVE
POC OXYCODONE: POSITIVE
POC PHENCYCLIDINE: NEGATIVE
POC PROPOXYPHENE: NEGATIVE
POC THC: NEGATIVE
POC TRICYCLIC ANTIDEPRESSANTS: NEGATIVE

## 2023-11-30 PROCEDURE — 1160F RVW MEDS BY RX/DR IN RCRD: CPT

## 2023-11-30 PROCEDURE — 99214 OFFICE O/P EST MOD 30 MIN: CPT

## 2023-11-30 PROCEDURE — 1159F MED LIST DOCD IN RCRD: CPT

## 2023-11-30 PROCEDURE — 1125F AMNT PAIN NOTED PAIN PRSNT: CPT

## 2023-11-30 RX ORDER — HYDROCODONE BITARTRATE AND ACETAMINOPHEN 10; 325 MG/1; MG/1
1 TABLET ORAL EVERY 6 HOURS PRN
Qty: 120 TABLET | Refills: 0 | Status: SHIPPED | OUTPATIENT
Start: 2023-11-30

## 2023-11-30 RX ORDER — NALOXONE HYDROCHLORIDE 4 MG/.1ML
1 SPRAY NASAL AS NEEDED
Qty: 2 EACH | Refills: 0 | Status: SHIPPED | OUTPATIENT
Start: 2023-11-30

## 2023-11-30 NOTE — PROGRESS NOTES
CHIEF COMPLAINT  Back and neck pain    Subjective   Tanya Jimenes is a 83 y.o. male  who presents for follow-up.  He has a history of chronic neck and back pain. He reports that his pain has remained consistent since his last office.    Today pain is 6/10VAS in severity. Pain is located in his neck and low back. Describes this pain as a nearly continuous aching and shooting. Pain is worsened by prolonged position, bending/twisting, and walking long distances. Pain improves with rest/reposition and medications. He has completed PT since his last office visit with minimal relief.      He was prescribed Oxycodone 7.5mg at last refill due to supply shortage of Hydrocodone. He reports that this medication has not be helpful to his pain. He would like to return to his previous regimen of Hydrocodone 10mg 4/day. He continues with Gabapentin 800mg 2/day. He reports intermittent constipation that he manages with increased fiber and water intake. Denies any other side effects from the regimen including somnolence. The regimen helps decrease pain by a moderate amount. Notes improvement in activity and function with regimen. ADL's by self. Denies any bowel or bladder changes.      Patient is not interested in interventional/surgical procedures.      Cannot take NSAID's due to CKD.    Back Pain  This is a chronic problem. The current episode started more than 1 year ago. The problem occurs constantly. The problem is unchanged (unchanged since last office visit). The pain is present in the lumbar spine. The quality of the pain is described as aching and shooting. The pain radiates to the right foot, right knee and right thigh. The pain is at a severity of 6/10. The pain is severe. The pain is The same all the time. The symptoms are aggravated by standing, position, bending and twisting. Stiffness is present All day. Pertinent negatives include no abdominal pain, chest pain, dysuria, fever, headaches, numbness or weakness. He has  tried analgesics (Gabapentin, Hydrocodone,) for the symptoms. The treatment provided moderate relief.   Neck Pain   This is a chronic problem. The problem occurs constantly. The problem has been unchanged (unchanged since last office visit). The pain is associated with an unknown factor. The pain is present in the left side, right side and midline. The quality of the pain is described as aching and shooting. The pain is at a severity of 6/10. The symptoms are aggravated by position and bending. The pain is Same all the time. Stiffness is present In the morning. Pertinent negatives include no chest pain, fever, headaches, numbness or weakness. He has tried neck support (Gabapentin, Hydrocodone,) for the symptoms. The treatment provided moderate relief.      PEG Assessment   What number best describes your pain on average in the past week?7  What number best describes how, during the past week, pain has interfered with your enjoyment of life?8  What number best describes how, during the past week, pain has interfered with your general activity?  8    Review of Pertinent Medical Data ---  ACCESSION NUMBER: [id number], [id number], [id number]     DATE: 11/14/2023 18:53     EXAMINATION: CT Head WO, CT Max Facial Area WO, CT Spine Cervical WO     PROVIDED INDICATION: Other (Please Specify): Head trauma, moderate-severe     COMPARISON: None     TECHNIQUE:     Axial computed tomographic images of the head were acquired without intravenous contrast and reconstructed in the axial plane. Coronal and sagittal MPR images were created at the CT console or PACS workstation.     Axial computed tomographic images of the face and mandible without intravenous contrast.. Reformatted images include axial, sagittal, and coronal.     Axial computed tomographic images of the cervical spine were acquired without intravenous contrast and reconstructed in the axial plane. Reformatted images include sagittal and coronal MPR.      FINDINGS:     CT HEAD:     Major Findings: There is no intracranial hemorrhage, mass, midline shift, or evidence of acute/subacute infarct.     Incidental and Normal Findings:      Deep Gray, Cortex, White matter: Scattered subcortical, periventricular and deep white matter hypodensities are consistent with chronic white matter ischemic changes.   Sulci and Ventricles: Moderate-marked global parenchymal volume loss with frontal predominance.   Soft Tissues, Cranium and Orbits: The lenses of the globes have been replaced.   Paranasal Sinuses and Mastoid Air Cells: Patchy opacification of the ethmoid air cells. Mild mucosal thickening in the maxillary sinuses with small amount of left maxillary sinus layering fluid. Prior right canal wall down mastoidectomy     CT MAXILLOFACIAL:     Nasal bones: No acute fracture.     Right:   Maxillary sinus: No acute fracture.   Orbit: No acute fracture. The globes and other intraorbital soft tissues are intact.   Zygomatic arch: No acute fracture.   Pterygoid plate: No acute fracture.     Left:   Maxillary sinus: No acute fracture.   Orbit: No acute fracture. The globes and other intraorbital soft tissues are intact.   Zygomatic arch: No acute fracture.   Pterygoid plate: No acute fracture.     Mandible: No acute mandibular fracture. The temporomandibular joints are in normal alignment.     CT CERVICAL SPINE:     Major Findings: There is no acute fracture or traumatic subluxation in the cervical spine.     Incidental and Normal Findings:     Soft tissues: There is no prevertebral fluid or edema.   Other: The partially visualized lung apices are clear. The visualized thyroid gland is homogeneous.   Degenerative changes: Moderate multilevel spondylosis includes disc height loss, disc-osteophyte formation, and facet arthropathy.       IMPRESSION:   1.  No acute intracranial abnormality.   2.  Moderate-marked cerebral volume loss with frontal lobe predominance.   3.  No acute  "maxillofacial fracture.   4.  No acute fracture or traumatic subluxation cervical spine.     Dictated by: Brett Vizcaino M.D. Signed by Brett Vizcaino M.D. on 11/14/2023 19:46   ##### Final #####     Dictated by:    BRETT VIZCAINO MD-RAD   Dictated DT/TM: 11/14/2023 7:46 pm     The following portions of the patient's history were reviewed and updated as appropriate: allergies, current medications, past family history, past medical history, past social history, past surgical history, and problem list.    Review of Systems   Constitutional:  Negative for fever.   HENT:  Positive for hearing loss.    Cardiovascular:  Negative for chest pain.   Gastrointestinal:  Negative for abdominal pain, constipation and diarrhea.   Genitourinary:  Negative for difficulty urinating and dysuria.   Musculoskeletal:  Positive for back pain and neck pain.   Neurological:  Negative for weakness, numbness and headaches.   Psychiatric/Behavioral:  Negative for sleep disturbance and suicidal ideas.      I have reviewed and confirmed the accuracy of the ROS as documented by the MA/LPN/RN TAYLER Washington    Vitals:    11/30/23 1323   BP: 120/78   Pulse: 83   Resp: 18   Temp: 98 °F (36.7 °C)   SpO2: 97%   Weight: 60.1 kg (132 lb 6.4 oz)   Height: 175.3 cm (69.02\")   PainSc:   6   PainLoc: Back     Objective   Physical Exam  Constitutional:       Appearance: Normal appearance.   HENT:      Head: Normocephalic.   Cardiovascular:      Rate and Rhythm: Normal rate and regular rhythm.   Pulmonary:      Effort: Pulmonary effort is normal.      Breath sounds: Normal breath sounds.   Musculoskeletal:      Cervical back: Tenderness and bony tenderness present. Decreased range of motion.      Lumbar back: Tenderness and bony tenderness present. Decreased range of motion.   Skin:     General: Skin is warm and dry.      Capillary Refill: Capillary refill takes less than 2 seconds.   Neurological:      General: No focal deficit present.    "   Mental Status: He is alert and oriented to person, place, and time.      Gait: Gait abnormal (slowed).   Psychiatric:         Mood and Affect: Mood normal.         Behavior: Behavior normal.         Thought Content: Thought content normal.         Cognition and Memory: Cognition normal.       Assessment & Plan   Diagnoses and all orders for this visit:    1. Cervical radiculopathy (Primary)    2. Lumbar radiculopathy  -     HYDROcodone-acetaminophen (NORCO)  MG per tablet; Take 1 tablet by mouth Every 6 (Six) Hours As Needed for Moderate Pain. 30 day supply.  Dispense: 120 tablet; Refill: 0    3. Chronic pain syndrome    4. Degeneration of intervertebral disc of lumbar region    5. Encounter for long-term (current) use of high-risk medication  -     naloxone (NARCAN) 4 MG/0.1ML nasal spray; 1 spray into the nostril(s) as directed by provider As Needed (Respiratory depression and accidental overdose).  Dispense: 2 each; Refill: 0      Tanya Jimenes reports a pain score of 6.  Given his pain assessment as noted, treatment options were discussed and the following options were decided upon as a follow-up plan to address the patient's pain: continuation of current treatment plan for pain and prescription for opiod analgesics.    --- Routine UDS in office today as part of monitoring requirements for controlled substances.  The specimen was viewed and the immunoassay result reviewed and is +BAR/OXY (appropriate for OXY as patient was prescribed this due to Hydrocodone shortage).  This specimen will be sent to [facility] laboratory for confirmation.     --- The patient signed an updated copy of the controlled substance agreement on 5/9/23  --- Prescription for Narcan sent to pharmacy.  --- Continue Hydrocodone. Patient appears stable with current regimen. No adverse effects. Regarding continuation of opioids, there is no evidence of aberrant behavior or any red flags.  The patient continues with appropriate response  to opioid therapy. ADL's remain intact by self.   --- Follow-up 2 months or sooner if needed     SULEIMAN REPORT  As part of the patient's treatment plan, I am prescribing controlled substances. The patient has been made aware of appropriate use of such medications, including potential risk of somnolence, limited ability to drive and/or work safely, and the potential for dependence or overdose. It has also been made clear that these medications are for use by this patient only, without concomitant use of alcohol or other substances unless prescribed.     Patient has completed prescribing agreement detailing terms of continued prescribing of controlled substances, including monitoring SULEIMAN reports, urine drug screening, and pill counts if necessary. The patient is aware that inappropriate use will results in cessation of prescribing such medications.    As the clinician, I personally reviewed the SULEIMAN from 11/30/23 while the patient was in the office today.    History and physical exam exhibit continued safe and appropriate use of controlled substances.    Dictated utilizing Dragon dictation.

## 2023-12-27 DIAGNOSIS — M54.16 LUMBAR RADICULOPATHY: ICD-10-CM

## 2023-12-27 RX ORDER — HYDROCODONE BITARTRATE AND ACETAMINOPHEN 10; 325 MG/1; MG/1
1 TABLET ORAL EVERY 6 HOURS PRN
Qty: 120 TABLET | Refills: 0 | Status: SHIPPED | OUTPATIENT
Start: 2023-12-27

## 2023-12-27 NOTE — TELEPHONE ENCOUNTER
Medication Refill Request    Date of phone call: 12/27/2023    Medication being requested: hydro/apap  mg sig: take 1 tab q 6 hrs prn  Qty: 120    Date of last visit: 11/30/2023      Date of last refill: 11/30/2023     SULEIMAN up to date?: yes    Next Follow up?: 1/30/2023    Any new pertinent information? (i.e, new medication allergies, new use of medications, change in patient's health or condition, non-compliance or inconsistency with prescribing agreement?):

## 2023-12-27 NOTE — TELEPHONE ENCOUNTER
Reviewed last office visit on 11/30/2023. NAE and SULEIMAN reviewed and are appropriate. Due to provider being out of office, will refill appropriately.

## 2024-01-30 ENCOUNTER — OFFICE VISIT (OUTPATIENT)
Dept: PAIN MEDICINE | Facility: CLINIC | Age: 84
End: 2024-01-30
Payer: MEDICARE

## 2024-01-30 VITALS
BODY MASS INDEX: 18.99 KG/M2 | DIASTOLIC BLOOD PRESSURE: 87 MMHG | RESPIRATION RATE: 12 BRPM | HEART RATE: 81 BPM | SYSTOLIC BLOOD PRESSURE: 149 MMHG | WEIGHT: 128.2 LBS | HEIGHT: 69 IN | OXYGEN SATURATION: 99 % | TEMPERATURE: 96.6 F

## 2024-01-30 DIAGNOSIS — M54.12 CERVICAL RADICULOPATHY: Primary | ICD-10-CM

## 2024-01-30 DIAGNOSIS — G89.4 CHRONIC PAIN SYNDROME: ICD-10-CM

## 2024-01-30 DIAGNOSIS — M51.36 DEGENERATION OF INTERVERTEBRAL DISC OF LUMBAR REGION: ICD-10-CM

## 2024-01-30 DIAGNOSIS — M54.16 LUMBAR RADICULOPATHY: ICD-10-CM

## 2024-01-30 PROCEDURE — 99214 OFFICE O/P EST MOD 30 MIN: CPT

## 2024-01-30 PROCEDURE — 1125F AMNT PAIN NOTED PAIN PRSNT: CPT

## 2024-01-30 PROCEDURE — 1159F MED LIST DOCD IN RCRD: CPT

## 2024-01-30 PROCEDURE — 1160F RVW MEDS BY RX/DR IN RCRD: CPT

## 2024-01-30 RX ORDER — HYDROCODONE BITARTRATE AND ACETAMINOPHEN 10; 325 MG/1; MG/1
1 TABLET ORAL EVERY 6 HOURS PRN
Qty: 120 TABLET | Refills: 0 | Status: SHIPPED | OUTPATIENT
Start: 2024-01-30 | End: 2024-01-31 | Stop reason: SDUPTHER

## 2024-01-30 NOTE — PROGRESS NOTES
CHIEF COMPLAINT  Back and neck pain    Subjective   Tanya Jimenes is a 83 y.o. male  who presents for follow-up.  He has a history of chronic neck and back pain. He reports that his pain level fluctuates based on activity level and weather changes.     Today pain is 6/10VAS in severity. Pain is located in his neck and low back. Describes this pain as a nearly continuous aching and shooting. Pain is worsened by prolonged position, bending/twisting, and walking long distances. Pain improves with rest/reposition and medications. He has completed PT since his last office visit with minimal relief.      Continues with Hydrocodone 10mg and Gabapentin 800mg daily. He reports intermittent constipation that he manages with increased fiber and water intake. Denies any other side effects from the regimen including somnolence. The regimen helps decrease pain by a moderate amount. Notes improvement in activity and function with regimen. ADL's by self. Denies any bowel or bladder changes.      Patient is not interested in interventional/surgical procedures.      Cannot take NSAID's due to CKD.    Patient reports Oxycodone is not effective for pain relief.     Back Pain  This is a chronic problem. The current episode started more than 1 year ago. The problem occurs constantly. The problem has been waxing and waning since onset. The pain is present in the lumbar spine. The quality of the pain is described as aching and shooting. The pain radiates to the right foot, right knee and right thigh. The pain is at a severity of 6/10. The pain is severe. The pain is The same all the time. The symptoms are aggravated by standing, position, bending and twisting. Stiffness is present All day. Associated symptoms include weakness. Pertinent negatives include no abdominal pain, chest pain, dysuria, fever, headaches or numbness. He has tried analgesics (Gabapentin, Hydrocodone,) for the symptoms. The treatment provided moderate relief.   Neck  Pain   This is a chronic problem. The problem occurs constantly. The problem has been waxing and waning. The pain is associated with an unknown factor. The pain is present in the left side, right side and midline. The quality of the pain is described as aching and shooting. The pain is at a severity of 6/10. The symptoms are aggravated by position and bending. The pain is Same all the time. Stiffness is present In the morning. Associated symptoms include weakness. Pertinent negatives include no chest pain, fever, headaches or numbness. He has tried neck support (Gabapentin, Hydrocodone,) for the symptoms. The treatment provided moderate relief.      PEG Assessment   What number best describes your pain on average in the past week?7  What number best describes how, during the past week, pain has interfered with your enjoyment of life?7  What number best describes how, during the past week, pain has interfered with your general activity?  7    Review of Pertinent Medical Data ---  ACCESSION NUMBER: [id number], [id number], [id number]     DATE: 11/14/2023 18:53     EXAMINATION: CT Head WO, CT Max Facial Area WO, CT Spine Cervical WO     PROVIDED INDICATION: Other (Please Specify): Head trauma, moderate-severe     COMPARISON: None     TECHNIQUE:     Axial computed tomographic images of the head were acquired without intravenous contrast and reconstructed in the axial plane. Coronal and sagittal MPR images were created at the CT console or PACS workstation.     Axial computed tomographic images of the face and mandible without intravenous contrast.. Reformatted images include axial, sagittal, and coronal.     Axial computed tomographic images of the cervical spine were acquired without intravenous contrast and reconstructed in the axial plane. Reformatted images include sagittal and coronal MPR.     FINDINGS:     CT HEAD:     Major Findings: There is no intracranial hemorrhage, mass, midline shift, or evidence of  acute/subacute infarct.     Incidental and Normal Findings:      Deep Gray, Cortex, White matter: Scattered subcortical, periventricular and deep white matter hypodensities are consistent with chronic white matter ischemic changes.   Sulci and Ventricles: Moderate-marked global parenchymal volume loss with frontal predominance.   Soft Tissues, Cranium and Orbits: The lenses of the globes have been replaced.   Paranasal Sinuses and Mastoid Air Cells: Patchy opacification of the ethmoid air cells. Mild mucosal thickening in the maxillary sinuses with small amount of left maxillary sinus layering fluid. Prior right canal wall down mastoidectomy     CT MAXILLOFACIAL:     Nasal bones: No acute fracture.     Right:   Maxillary sinus: No acute fracture.   Orbit: No acute fracture. The globes and other intraorbital soft tissues are intact.   Zygomatic arch: No acute fracture.   Pterygoid plate: No acute fracture.     Left:   Maxillary sinus: No acute fracture.   Orbit: No acute fracture. The globes and other intraorbital soft tissues are intact.   Zygomatic arch: No acute fracture.   Pterygoid plate: No acute fracture.     Mandible: No acute mandibular fracture. The temporomandibular joints are in normal alignment.     CT CERVICAL SPINE:     Major Findings: There is no acute fracture or traumatic subluxation in the cervical spine.     Incidental and Normal Findings:     Soft tissues: There is no prevertebral fluid or edema.   Other: The partially visualized lung apices are clear. The visualized thyroid gland is homogeneous.   Degenerative changes: Moderate multilevel spondylosis includes disc height loss, disc-osteophyte formation, and facet arthropathy.     IMPRESSION:   1.  No acute intracranial abnormality.   2.  Moderate-marked cerebral volume loss with frontal lobe predominance.   3.  No acute maxillofacial fracture.   4.  No acute fracture or traumatic subluxation cervical spine.     Dictated by: Brett Waters,  "M.D. Signed by Brett Vizcaino M.D. on 11/14/2023 19:46   ##### Final #####     Dictated by:    BRETT VIZCAINO MD-RAD   Dictated DT/TM: 11/14/2023 7:46 pm   Interpreted and electronically signed by:  BRETT VIZCAINO MD-RAD   Signed DT/TM:  11/14/2023 7:46 pm     The following portions of the patient's history were reviewed and updated as appropriate: allergies, current medications, past family history, past medical history, past social history, past surgical history, and problem list.    Review of Systems   Constitutional:  Negative for activity change, fatigue and fever.   HENT:  Negative for congestion.    Respiratory:  Positive for cough. Negative for chest tightness.    Cardiovascular:  Negative for chest pain.   Gastrointestinal:  Negative for abdominal pain, constipation and diarrhea.   Genitourinary:  Negative for difficulty urinating and dysuria.   Musculoskeletal:  Positive for back pain and neck pain.   Neurological:  Positive for weakness. Negative for dizziness, light-headedness, numbness and headaches.   Psychiatric/Behavioral:  Negative for agitation, sleep disturbance and suicidal ideas. The patient is not nervous/anxious.      I have reviewed and confirmed the accuracy of the ROS as documented by the MA/LPN/RN TAYLER Washington    Vitals:    01/30/24 1356   BP: 149/87   BP Location: Right arm   Patient Position: Sitting   Cuff Size: Adult   Pulse: 81   Resp: 12   Temp: 96.6 °F (35.9 °C)   TempSrc: Temporal   SpO2: 99%   Weight: 58.2 kg (128 lb 3.2 oz)   Height: 175.3 cm (69.02\")   PainSc:   6     Objective   Physical Exam  Constitutional:       Appearance: Normal appearance.   HENT:      Head: Normocephalic.   Cardiovascular:      Rate and Rhythm: Normal rate and regular rhythm.   Pulmonary:      Effort: Pulmonary effort is normal.      Breath sounds: Normal breath sounds.   Musculoskeletal:      Cervical back: Tenderness and bony tenderness present. Decreased range of motion.      " Lumbar back: Tenderness and bony tenderness present. Decreased range of motion.   Skin:     General: Skin is warm and dry.      Capillary Refill: Capillary refill takes less than 2 seconds.   Neurological:      General: No focal deficit present.      Mental Status: He is alert and oriented to person, place, and time.      Gait: Gait abnormal (slowed).   Psychiatric:         Mood and Affect: Mood normal.         Behavior: Behavior normal.         Thought Content: Thought content normal.         Cognition and Memory: Cognition normal.       Assessment & Plan   Diagnoses and all orders for this visit:    1. Cervical radiculopathy (Primary)    2. Lumbar radiculopathy  -     HYDROcodone-acetaminophen (NORCO)  MG per tablet; Take 1 tablet by mouth Every 6 (Six) Hours As Needed for Moderate Pain. 30 day supply.  Dispense: 120 tablet; Refill: 0    3. Chronic pain syndrome    4. Degeneration of intervertebral disc of lumbar region      Tanya Jimenes reports a pain score of 6.  Given his pain assessment as noted, treatment options were discussed and the following options were decided upon as a follow-up plan to address the patient's pain: continuation of current treatment plan for pain and prescription for opiod analgesics.    --- The urine drug screen confirmation from 11/30/23 has been reviewed and the result is appropriate based on patient history and SULEIMAN report  --- The patient signed an updated copy of the controlled substance agreement on 5/9/23  --- Continue Gabapentin. No refill needed at this time.   --- Narcan prescription current  --- Continue Hydrocodone. Patient appears stable with current regimen. No adverse effects. Regarding continuation of opioids, there is no evidence of aberrant behavior or any red flags.  The patient continues with appropriate response to opioid therapy. ADL's remain intact by self.   --- Follow-up 2 months or sooner if needed     SULEIMAN REPORT  As part of the patient's treatment  plan, I am prescribing controlled substances. The patient has been made aware of appropriate use of such medications, including potential risk of somnolence, limited ability to drive and/or work safely, and the potential for dependence or overdose. It has also been made clear that these medications are for use by this patient only, without concomitant use of alcohol or other substances unless prescribed.     Patient has completed prescribing agreement detailing terms of continued prescribing of controlled substances, including monitoring SULEIMAN reports, urine drug screening, and pill counts if necessary. The patient is aware that inappropriate use will results in cessation of prescribing such medications.    As the clinician, I personally reviewed the SULEIMAN from 1/30/24 while the patient was in the office today.    History and physical exam exhibit continued safe and appropriate use of controlled substances.    Dictated utilizing Dragon dictation.

## 2024-01-31 DIAGNOSIS — M54.16 LUMBAR RADICULOPATHY: ICD-10-CM

## 2024-01-31 NOTE — TELEPHONE ENCOUNTER
Mr. Jimenes needs his hydro/apap  mg Rx to be sent to [facility]. He states that [facility] doesn't have the medication in stock. I've called and canceled the Rx.

## 2024-02-01 RX ORDER — HYDROCODONE BITARTRATE AND ACETAMINOPHEN 10; 325 MG/1; MG/1
1 TABLET ORAL EVERY 6 HOURS PRN
Qty: 120 TABLET | Refills: 0 | Status: SHIPPED | OUTPATIENT
Start: 2024-02-01

## 2024-02-26 DIAGNOSIS — M54.16 LUMBAR RADICULOPATHY: ICD-10-CM

## 2024-02-26 NOTE — TELEPHONE ENCOUNTER
Medication Refill Request    Date of phone call: 2/26/2024    Medication being requested: hydro/apap  mg sig: take 1 tab q 6 hrs prn  Qty: 120    Date of last visit: 1/30/2024    Date of last refill: 2/1/2024    SULEIMAN up to date?: yes    Next Follow up?: 4/1/2024    Any new pertinent information? (i.e, new medication allergies, new use of medications, change in patient's health or condition, non-compliance or inconsistency with prescribing agreement?):

## 2024-02-27 RX ORDER — HYDROCODONE BITARTRATE AND ACETAMINOPHEN 10; 325 MG/1; MG/1
1 TABLET ORAL EVERY 6 HOURS PRN
Qty: 120 TABLET | Refills: 0 | Status: SHIPPED | OUTPATIENT
Start: 2024-02-27

## 2024-02-27 NOTE — TELEPHONE ENCOUNTER
Reviewed UDS and SULEIMAN (scanned into chart). Both updated and appropriate. Refill appropriate.  DNF 3/2/24.

## 2024-04-01 ENCOUNTER — OFFICE VISIT (OUTPATIENT)
Dept: PAIN MEDICINE | Facility: CLINIC | Age: 84
End: 2024-04-01
Payer: MEDICARE

## 2024-04-01 VITALS
RESPIRATION RATE: 18 BRPM | HEART RATE: 87 BPM | OXYGEN SATURATION: 97 % | HEIGHT: 69 IN | DIASTOLIC BLOOD PRESSURE: 64 MMHG | TEMPERATURE: 97.3 F | BODY MASS INDEX: 19.91 KG/M2 | WEIGHT: 134.4 LBS | SYSTOLIC BLOOD PRESSURE: 109 MMHG

## 2024-04-01 DIAGNOSIS — Z79.899 ENCOUNTER FOR LONG-TERM (CURRENT) USE OF HIGH-RISK MEDICATION: ICD-10-CM

## 2024-04-01 DIAGNOSIS — M54.16 LUMBAR RADICULOPATHY: Primary | ICD-10-CM

## 2024-04-01 DIAGNOSIS — G89.4 CHRONIC PAIN SYNDROME: ICD-10-CM

## 2024-04-01 DIAGNOSIS — M54.12 CERVICAL RADICULOPATHY: ICD-10-CM

## 2024-04-01 DIAGNOSIS — M51.36 DEGENERATION OF INTERVERTEBRAL DISC OF LUMBAR REGION: ICD-10-CM

## 2024-04-01 PROCEDURE — 99214 OFFICE O/P EST MOD 30 MIN: CPT

## 2024-04-01 PROCEDURE — 1125F AMNT PAIN NOTED PAIN PRSNT: CPT

## 2024-04-01 PROCEDURE — 1159F MED LIST DOCD IN RCRD: CPT

## 2024-04-01 PROCEDURE — 1160F RVW MEDS BY RX/DR IN RCRD: CPT

## 2024-04-01 RX ORDER — HYDROCODONE BITARTRATE AND ACETAMINOPHEN 10; 325 MG/1; MG/1
1 TABLET ORAL EVERY 6 HOURS PRN
Qty: 120 TABLET | Refills: 0 | Status: SHIPPED | OUTPATIENT
Start: 2024-04-01

## 2024-04-01 NOTE — PROGRESS NOTES
CHIEF COMPLAINT  Back and neck pain    Subjective   Tanya Jimenes is a 83 y.o. male  who presents for follow-up.  He has a history of chronic neck and back pain. He reports that his pain level fluctuates based on activity level.     Today pain is 6/10VAS in severity. Pain is located in his neck and low back. Describes this pain as a nearly continuous aching and shooting. Pain is worsened by prolonged position, bending/twisting, and walking long distances. Pain improves with rest/reposition and medications. He has completed PT since his last office visit with minimal relief.      Continues with Hydrocodone 10mg 4/day and Gabapentin 800mg daily. He reports intermittent constipation that he manages with increased fiber and water intake. Denies any other side effects from the regimen including somnolence. The regimen helps decrease pain by a moderate amount. Notes improvement in activity and function with regimen. ADL's by self. Denies any bowel or bladder changes.      Patient is not interested in interventional/surgical procedures.      Cannot take NSAID's due to CKD.     Patient reports Oxycodone is not effective for pain relief.     Back Pain  This is a chronic problem. The current episode started more than 1 year ago. The problem occurs constantly. The problem is unchanged (unchanged since last office visit). The pain is present in the lumbar spine. The quality of the pain is described as aching and shooting. The pain radiates to the right foot, right knee and right thigh. The pain is at a severity of 6/10 (severity in pain varies based on activity level). The pain is severe. The pain is The same all the time. The symptoms are aggravated by standing, position, bending and twisting. Stiffness is present All day. Associated symptoms include weakness. Pertinent negatives include no abdominal pain, chest pain, dysuria, fever, headaches or numbness. He has tried analgesics (Gabapentin, Hydrocodone,) for the symptoms. The  treatment provided moderate relief.   Neck Pain   This is a chronic problem. The problem occurs constantly. The problem has been unchanged (unchanged since last office visit). The pain is associated with an unknown factor. The pain is present in the left side, right side and midline. The quality of the pain is described as aching and shooting. The pain is at a severity of 6/10 (severity in pain varies based on activity leve). The symptoms are aggravated by position and bending. The pain is Same all the time. Stiffness is present In the morning. Associated symptoms include weakness. Pertinent negatives include no chest pain, fever, headaches or numbness. He has tried neck support (Gabapentin, Hydrocodone,) for the symptoms. The treatment provided moderate relief.      PEG Assessment   What number best describes your pain on average in the past week?5  What number best describes how, during the past week, pain has interfered with your enjoyment of life?8  What number best describes how, during the past week, pain has interfered with your general activity?  8    The following portions of the patient's history were reviewed and updated as appropriate: allergies, current medications, past family history, past medical history, past social history, past surgical history, and problem list.    Review of Systems   Constitutional:  Negative for activity change, fatigue and fever.   HENT:  Negative for congestion.    Respiratory:  Negative for cough and chest tightness.    Cardiovascular:  Negative for chest pain.   Gastrointestinal:  Negative for abdominal pain, constipation and diarrhea.   Genitourinary:  Negative for difficulty urinating and dysuria.   Musculoskeletal:  Positive for back pain and neck pain.   Neurological:  Positive for weakness. Negative for dizziness, light-headedness, numbness and headaches.   Psychiatric/Behavioral:  Positive for agitation. Negative for sleep disturbance and suicidal ideas. The patient  "is nervous/anxious.      I have reviewed and confirmed the accuracy of the ROS as documented by the MA/LPN/RN TAYLER Washington    Vitals:    04/01/24 1419   BP: 109/64   BP Location: Left arm   Patient Position: Sitting   Cuff Size: Adult   Pulse: 87   Resp: 18   Temp: 97.3 °F (36.3 °C)   TempSrc: Temporal   SpO2: 97%   Weight: 61 kg (134 lb 6.4 oz)   Height: 175.3 cm (69.02\")   PainSc:   6     Objective   Physical Exam  Constitutional:       Appearance: Normal appearance.   HENT:      Head: Normocephalic.   Cardiovascular:      Rate and Rhythm: Normal rate and regular rhythm.   Pulmonary:      Effort: Pulmonary effort is normal.      Breath sounds: Normal breath sounds.   Musculoskeletal:      Cervical back: Tenderness and bony tenderness present. Decreased range of motion.      Lumbar back: Tenderness and bony tenderness present. Decreased range of motion.   Skin:     General: Skin is warm and dry.      Capillary Refill: Capillary refill takes less than 2 seconds.   Neurological:      General: No focal deficit present.      Mental Status: He is alert and oriented to person, place, and time.      Gait: Gait abnormal (slowed, ambulates with cane).   Psychiatric:         Mood and Affect: Mood normal.         Behavior: Behavior normal.         Thought Content: Thought content normal.         Cognition and Memory: Cognition normal.       Assessment & Plan   Diagnoses and all orders for this visit:    1. Lumbar radiculopathy (Primary)  -     HYDROcodone-acetaminophen (NORCO)  MG per tablet; Take 1 tablet by mouth Every 6 (Six) Hours As Needed for Moderate Pain. 30 day supply.  Dispense: 120 tablet; Refill: 0    2. Cervical radiculopathy  -     HYDROcodone-acetaminophen (NORCO)  MG per tablet; Take 1 tablet by mouth Every 6 (Six) Hours As Needed for Moderate Pain. 30 day supply.  Dispense: 120 tablet; Refill: 0    3. Chronic pain syndrome  -     HYDROcodone-acetaminophen (NORCO)  MG per tablet; " Take 1 tablet by mouth Every 6 (Six) Hours As Needed for Moderate Pain. 30 day supply.  Dispense: 120 tablet; Refill: 0    4. Degeneration of intervertebral disc of lumbar region    5. Encounter for long-term (current) use of high-risk medication      Tanya Jimenes reports a pain score of 6.  Given his pain assessment as noted, treatment options were discussed and the following options were decided upon as a follow-up plan to address the patient's pain: continuation of current treatment plan for pain and prescription for opiod analgesics.    --- The urine drug screen confirmation from 11/30/23 has been reviewed and the result is appropriate based on patient history and SULEIMAN report  --- The patient signed an updated copy of the controlled substance agreement on 5/9/23  --- Continue Gabapentin. No refill needed at this time.   --- Narcan prescription current  --- Continue Hydrocodone. Patient appears stable with current regimen. No adverse effects. Regarding continuation of opioids, there is no evidence of aberrant behavior or any red flags.  The patient continues with appropriate response to opioid therapy. ADL's remain intact by self.   --- Follow-up 2 months or sooner if needed     SULEIMAN REPORT  As part of the patient's treatment plan, I am prescribing controlled substances. The patient has been made aware of appropriate use of such medications, including potential risk of somnolence, limited ability to drive and/or work safely, and the potential for dependence or overdose. It has also been made clear that these medications are for use by this patient only, without concomitant use of alcohol or other substances unless prescribed.     Patient has completed prescribing agreement detailing terms of continued prescribing of controlled substances, including monitoring SULEIMAN reports, urine drug screening, and pill counts if necessary. The patient is aware that inappropriate use will results in cessation of prescribing  such medications.    As the clinician, I personally reviewed the SULEIMAN from 4/1/24 while the patient was in the office today.    History and physical exam exhibit continued safe and appropriate use of controlled substances.    Dictated utilizing Dragon dictation.

## 2024-04-12 NOTE — PROGRESS NOTES
CHIEF COMPLAINT  Neck and back pain    Subjective   Tanya Jimenes is a 81 y.o. male  who presents for follow-up.  He has a history of chronic neck and back pain. Reports his pain is UNCHANGED since last evaluation.    Complains of pain in his low back and neck. Today his pain is 5/10VAS.  Describes the pain as nearly continuous throbbing.  Pain increases with activity, prolonged position; pain decreases with medication and rest.  Continues with Hydrocodone 7.5/325 4/day and gabapentin 800 mg 2/day. Denies any side effects from the regimen.   The regimen helps decrease his pain by approximately 30-50%.   ADL's by self.  Denies any bowel or bladder changes. Does not take Klonopin at same time as Hydrocodone. Prescribed by psychiatrist.     Is planning to re-start Humira for Psoriasis.   Reports he has re-started insulin. Reports he is being followed by [facility].     Cannot take NSAIDs due to CKD.  Does not want to repeat injections. Reports he has issues with his tongue afterwards.    Patient remained masked during entire encounter. No cough present. I donned a mask and eye protection throughout entire visit. Prior to donning mask and eye protection, hand hygiene was performed, as well as when it was doffed.  I was closer than 6 feet, but not for an extended period of time. No obvious exposure to any bodily fluids.    Neck Pain   This is a chronic problem. The current episode started more than 1 year ago. The problem occurs constantly. The pain is at a severity of 5/10. The pain is moderate. The symptoms are aggravated by position and bending. Pertinent negatives include no chest pain, fever, headaches, numbness, visual change or weakness. He has tried neck support and oral narcotics (gabapentin, Hydrocodone) for the symptoms. The treatment provided moderate relief.   Back Pain  This is a chronic problem. The current episode started more than 1 year ago. The problem occurs constantly. The problem has been  "waxing and waning since onset. The pain is at a severity of 5/10. The pain is moderate. Associated symptoms include abdominal pain. Pertinent negatives include no bladder incontinence, bowel incontinence, chest pain, dysuria, fever, headaches, numbness or weakness. He has tried analgesics (gabapentin, Hydrocodone) for the symptoms. The treatment provided moderate relief.      The following portions of the patient's history were reviewed and updated as appropriate: allergies, current medications, past family history, past medical history, past social history, past surgical history and problem list.    Review of Systems   Constitutional: Negative for fever.   Cardiovascular: Negative for chest pain.   Gastrointestinal: Positive for abdominal pain. Negative for bowel incontinence.   Genitourinary: Negative for bladder incontinence and dysuria.   Musculoskeletal: Positive for back pain and neck pain.   Neurological: Negative for weakness, numbness and headaches.     I have reviewed and confirmed the accuracy of the ROS as documented by the MA/LPN/RN TAYLER Dimas      Vitals:    08/16/21 1257   BP: 119/69   BP Location: Right arm   Patient Position: Sitting   Pulse: 85   Resp: 16   Temp: 96.7 °F (35.9 °C)   TempSrc: Temporal   Weight: 60.7 kg (133 lb 12.8 oz)   Height: 175.3 cm (69.02\")   PainSc:   5   PainLoc: Back  Comment: neck       Objective   Physical Exam  Vitals and nursing note reviewed.   Constitutional:       Appearance: Normal appearance. He is well-developed.   HENT:      Head: Normocephalic and atraumatic.   Musculoskeletal:      Cervical back: Tenderness present. Muscular tenderness present. Decreased range of motion.      Lumbar back: Tenderness present. Decreased range of motion.   Skin:     General: Skin is warm and dry.   Neurological:      Mental Status: He is alert.      Gait: Gait normal.   Psychiatric:         Speech: Speech normal.         Behavior: Behavior normal. Behavior is " cooperative.         Thought Content: Thought content normal.         Judgment: Judgment normal.         Assessment/Plan   Diagnoses and all orders for this visit:    1. Chronic pain syndrome (Primary)    2. Degeneration of intervertebral disc of lumbar region    3. Osteoarthritis of cervical spine, unspecified spinal osteoarthritis complication status    4. Encounter for long-term (current) use of high-risk medication    Other orders  -     HYDROcodone-acetaminophen (NORCO)  MG per tablet; Take 1 tablet by mouth Every 6 (Six) Hours As Needed for Moderate Pain .  Dispense: 120 tablet; Refill: 0      --- The urine drug screen confirmation from 6-21-21 has been reviewed and the result is APPROPRIATE based on patient history and SULEIMAN report  --- The patient signed an updated copy of the controlled substance agreement on 12-21-20  --- Refill Hydrocodone. Patient appears stable with current regimen. No adverse effects. Regarding continuation of opioids, there is no evidence of aberrant behavior or any red flags.  The patient continues with appropriate response to opioid therapy. ADL's remain intact by self.   --- Follow-up 2 months or sooner if needed       SULEIMAN REPORT  As part of the patient's treatment plan, I am prescribing controlled substances. The patient has been made aware of appropriate use of such medications, including potential risk of somnolence, limited ability to drive and/or work safely, and the potential for dependence or overdose. It has also bee made clear that these medications are for use by this patient only, without concomitant use of alcohol or other substances unless prescribed.     Patient has completed prescribing agreement detailing terms of continued prescribing of controlled substances, including monitoring SULEIMAN reports, urine drug screening, and pill counts if necessary. The patient is aware that inappropriate use will results in cessation of prescribing such medications.    As  the clinician, I personally reviewed the SULEIMAN from 8-16-21 while the patient was in the office today.    History and physical exam exhibit continued safe and appropriate use of controlled substances.       Dictated utilizing Dragon dictation.      [facility]

## 2024-04-29 DIAGNOSIS — M54.16 LUMBAR RADICULOPATHY: ICD-10-CM

## 2024-04-29 DIAGNOSIS — G89.4 CHRONIC PAIN SYNDROME: ICD-10-CM

## 2024-04-29 DIAGNOSIS — M54.12 CERVICAL RADICULOPATHY: ICD-10-CM

## 2024-04-29 RX ORDER — HYDROCODONE BITARTRATE AND ACETAMINOPHEN 10; 325 MG/1; MG/1
1 TABLET ORAL EVERY 6 HOURS PRN
Qty: 120 TABLET | Refills: 0 | Status: SHIPPED | OUTPATIENT
Start: 2024-04-29

## 2024-05-14 ENCOUNTER — TELEPHONE (OUTPATIENT)
Dept: PAIN MEDICINE | Facility: CLINIC | Age: 84
End: 2024-05-14
Payer: MEDICARE

## 2024-05-14 NOTE — TELEPHONE ENCOUNTER
I spoke with Hilary Jalil and relayed the above message. He states that he is moving right now and can't go and get an x-ray done. He will call back in a few days to let you know if he wants you to put in an order to have it done.

## 2024-05-14 NOTE — TELEPHONE ENCOUNTER
Mr. Jimenes called today and states that for the last 2 weeks he has had pain in his right hip that's not getting any better. The hydro/apap isn't helping the pain. He states that she can't take ibuprofen. I suggested he try ice and heat over the area to see if that helps. He would like to know what else you would suggest to help with the pain. Please advise

## 2024-05-14 NOTE — TELEPHONE ENCOUNTER
I agree with heat/ice. He could try a topical pain cream to see if this helps. Otherwise, we may need to have an x-ray for further evaluation.

## 2024-06-03 ENCOUNTER — OFFICE VISIT (OUTPATIENT)
Dept: PAIN MEDICINE | Facility: CLINIC | Age: 84
End: 2024-06-03
Payer: MEDICARE

## 2024-06-03 VITALS
OXYGEN SATURATION: 97 % | RESPIRATION RATE: 12 BRPM | TEMPERATURE: 96.9 F | HEIGHT: 69 IN | WEIGHT: 131 LBS | BODY MASS INDEX: 19.4 KG/M2 | HEART RATE: 85 BPM | SYSTOLIC BLOOD PRESSURE: 143 MMHG | DIASTOLIC BLOOD PRESSURE: 74 MMHG

## 2024-06-03 DIAGNOSIS — Z79.899 ENCOUNTER FOR LONG-TERM (CURRENT) USE OF HIGH-RISK MEDICATION: ICD-10-CM

## 2024-06-03 DIAGNOSIS — M51.36 DEGENERATION OF INTERVERTEBRAL DISC OF LUMBAR REGION: Primary | ICD-10-CM

## 2024-06-03 DIAGNOSIS — G89.4 CHRONIC PAIN SYNDROME: ICD-10-CM

## 2024-06-03 DIAGNOSIS — M54.12 CERVICAL RADICULOPATHY: ICD-10-CM

## 2024-06-03 DIAGNOSIS — M54.16 LUMBAR RADICULOPATHY: ICD-10-CM

## 2024-06-03 PROCEDURE — 1159F MED LIST DOCD IN RCRD: CPT

## 2024-06-03 PROCEDURE — 1160F RVW MEDS BY RX/DR IN RCRD: CPT

## 2024-06-03 PROCEDURE — 80305 DRUG TEST PRSMV DIR OPT OBS: CPT

## 2024-06-03 PROCEDURE — 99214 OFFICE O/P EST MOD 30 MIN: CPT

## 2024-06-03 PROCEDURE — 1125F AMNT PAIN NOTED PAIN PRSNT: CPT

## 2024-06-03 RX ORDER — HYDROCODONE BITARTRATE AND ACETAMINOPHEN 10; 325 MG/1; MG/1
1 TABLET ORAL EVERY 6 HOURS PRN
Qty: 120 TABLET | Refills: 0 | Status: SHIPPED | OUTPATIENT
Start: 2024-06-03

## 2024-06-03 RX ORDER — GABAPENTIN 800 MG/1
800 TABLET ORAL 2 TIMES DAILY
Qty: 180 TABLET | Refills: 1 | Status: SHIPPED | OUTPATIENT
Start: 2024-06-03

## 2024-06-03 NOTE — PROGRESS NOTES
CHIEF COMPLAINT  Back and neck pain    Subjective   Tanya Jmienes is a 84 y.o. male  who presents for follow-up.  He has a history of chronic neck and back pain. He reports that his pain level has remained consistent since his last office visit. He messaged our office on 5/14/24 with complaints of right hip pain. I offered to x-ray his hip but he was moving at that time. He states today that his hip pain has improved.     Today pain is 7/10VAS in severity. Pain is located in his neck and low back. Describes this pain as a nearly continuous aching and shooting. Pain is worsened by prolonged position, bending/twisting, and walking long distances. Pain improves with rest/reposition and medications. He has completed PT since his last office visit with minimal relief.      Continues with Hydrocodone 10mg 4/day and Gabapentin 800mg at bedtime. He reports intermittent constipation that he manages with increased fiber and water intake. Denies any other side effects from the regimen including somnolence. The regimen helps decrease pain by a moderate amount. Notes improvement in activity and function with regimen. ADL's by self. Denies any bowel or bladder changes.      Patient is not interested in interventional/surgical procedures.      Cannot take NSAID's due to CKD.     Patient reports Oxycodone is not effective for pain relief.     Back Pain  This is a chronic problem. The current episode started more than 1 year ago. The problem occurs constantly. The problem has been waxing and waning since onset. The pain is present in the lumbar spine. The quality of the pain is described as aching and shooting. The pain radiates to the right foot, right knee and right thigh. The pain is at a severity of 7/10 (severity in pain varies based on activity level). The pain is severe. The pain is The same all the time. The symptoms are aggravated by standing, position, bending and twisting. Stiffness is present All day. Associated symptoms  include weakness. Pertinent negatives include no abdominal pain, chest pain, dysuria, fever, headaches or numbness. He has tried analgesics (Gabapentin, Hydrocodone,) for the symptoms. The treatment provided moderate relief.   Neck Pain   This is a chronic problem. The problem occurs constantly. The problem has been waxing and waning. The pain is associated with an unknown factor. The pain is present in the left side, right side and midline. The quality of the pain is described as aching and shooting. The pain is at a severity of 7/10 (severity in pain varies based on activity leve). The symptoms are aggravated by position and bending. The pain is Same all the time. Stiffness is present In the morning. Associated symptoms include weakness. Pertinent negatives include no chest pain, fever, headaches or numbness. He has tried neck support (Gabapentin, Hydrocodone,) for the symptoms. The treatment provided moderate relief.      PEG Assessment   What number best describes your pain on average in the past week?9  What number best describes how, during the past week, pain has interfered with your enjoyment of life?9  What number best describes how, during the past week, pain has interfered with your general activity?  9    Review of Pertinent Medical Data ---      The following portions of the patient's history were reviewed and updated as appropriate: allergies, current medications, past family history, past medical history, past social history, past surgical history, and problem list.    Review of Systems   Constitutional:  Negative for activity change (LESS), fatigue and fever.   Respiratory:  Negative for cough and chest tightness.    Cardiovascular:  Negative for chest pain.   Gastrointestinal:  Negative for abdominal pain, constipation and diarrhea.   Genitourinary:  Negative for difficulty urinating and dysuria.   Musculoskeletal:  Positive for back pain and neck pain.   Neurological:  Positive for weakness.  "Negative for dizziness, numbness and headaches.   Psychiatric/Behavioral:  Negative for agitation, sleep disturbance and suicidal ideas. The patient is not nervous/anxious.      I have reviewed and confirmed the accuracy of the ROS as documented by the MA/LPN/RN TAYLER Washington    Vitals:    06/03/24 1437   BP: 143/74   BP Location: Left arm   Patient Position: Sitting   Cuff Size: Adult   Pulse: 85   Resp: 12   Temp: 96.9 °F (36.1 °C)   TempSrc: Temporal   SpO2: 97%   Weight: 59.4 kg (131 lb)   Height: 175.3 cm (69.02\")   PainSc:   7     Objective   Physical Exam  Constitutional:       Appearance: Normal appearance.   HENT:      Head: Normocephalic.   Cardiovascular:      Rate and Rhythm: Normal rate and regular rhythm.   Pulmonary:      Effort: Pulmonary effort is normal.      Breath sounds: Normal breath sounds.   Musculoskeletal:      Cervical back: Tenderness and bony tenderness present. Decreased range of motion.      Lumbar back: Tenderness and bony tenderness present. Decreased range of motion.   Skin:     General: Skin is warm and dry.      Capillary Refill: Capillary refill takes less than 2 seconds.   Neurological:      General: No focal deficit present.      Mental Status: He is alert and oriented to person, place, and time.      Gait: Gait abnormal (slowed, ambulates with cane).   Psychiatric:         Mood and Affect: Mood normal.         Behavior: Behavior normal.         Thought Content: Thought content normal.         Cognition and Memory: Cognition normal.       Assessment & Plan   Diagnoses and all orders for this visit:    1. Degeneration of intervertebral disc of lumbar region (Primary)    2. Lumbar radiculopathy  -     HYDROcodone-acetaminophen (NORCO)  MG per tablet; Take 1 tablet by mouth Every 6 (Six) Hours As Needed for Moderate Pain. 30 day supply.  Dispense: 120 tablet; Refill: 0  -     gabapentin (NEURONTIN) 800 MG tablet; Take 1 tablet by mouth 2 (Two) Times a Day.  " Dispense: 180 tablet; Refill: 1    3. Cervical radiculopathy  -     HYDROcodone-acetaminophen (NORCO)  MG per tablet; Take 1 tablet by mouth Every 6 (Six) Hours As Needed for Moderate Pain. 30 day supply.  Dispense: 120 tablet; Refill: 0    4. Chronic pain syndrome  -     HYDROcodone-acetaminophen (NORCO)  MG per tablet; Take 1 tablet by mouth Every 6 (Six) Hours As Needed for Moderate Pain. 30 day supply.  Dispense: 120 tablet; Refill: 0    5. Encounter for long-term (current) use of high-risk medication      Tanya Jimenes reports a pain score of 7.  Given his pain assessment as noted, treatment options were discussed and the following options were decided upon as a follow-up plan to address the patient's pain: continuation of current treatment plan for pain and prescription for opiod analgesics.    --- Routine UDS in office today as part of monitoring requirements for controlled substances.  The specimen was viewed and the immunoassay result reviewed and is BAR/OPI.  This specimen will be sent to [facility] laboratory for confirmation.     --- The patient signed an updated copy of the controlled substance agreement on 6/3/24  --- Continue Gabapentin. Refill sent to pharmacy.   --- Narcan prescription current  --- Continue Hydrocodone. Patient appears stable with current regimen. No adverse effects. Regarding continuation of opioids, there is no evidence of aberrant behavior or any red flags.  The patient continues with appropriate response to opioid therapy. ADL's remain intact by self.   --- Follow-up 2 months or sooner if needed     SULEIMAN REPORT  As part of the patient's treatment plan, I am prescribing controlled substances. The patient has been made aware of appropriate use of such medications, including potential risk of somnolence, limited ability to drive and/or work safely, and the potential for dependence or overdose. It has also been made clear that these medications are for use by this  patient only, without concomitant use of alcohol or other substances unless prescribed.     Patient has completed prescribing agreement detailing terms of continued prescribing of controlled substances, including monitoring SULEIMAN reports, urine drug screening, and pill counts if necessary. The patient is aware that inappropriate use will results in cessation of prescribing such medications.    As the clinician, I personally reviewed the SULEIMAN from 6/3/24 while the patient was in the office today.    History and physical exam exhibit continued safe and appropriate use of controlled substances.    Dictated utilizing Dragon dictation.

## 2024-07-01 DIAGNOSIS — M54.12 CERVICAL RADICULOPATHY: ICD-10-CM

## 2024-07-01 DIAGNOSIS — G89.4 CHRONIC PAIN SYNDROME: ICD-10-CM

## 2024-07-01 DIAGNOSIS — M54.16 LUMBAR RADICULOPATHY: ICD-10-CM

## 2024-07-01 RX ORDER — HYDROCODONE BITARTRATE AND ACETAMINOPHEN 10; 325 MG/1; MG/1
1 TABLET ORAL EVERY 6 HOURS PRN
Qty: 120 TABLET | Refills: 0 | Status: SHIPPED | OUTPATIENT
Start: 2024-07-01

## 2024-07-01 NOTE — TELEPHONE ENCOUNTER
Medication Refill Request    Date of phone call: 7-1-24    Medication being requested: norco  mg sig:  Take 1 tablet by mouth Every 6 (Six) Hours As Needed for Moderate Pain.   Qty: 120    Date of last visit: 6-3-24    Date of last refill:     SULEIMAN up to date?:     Next Follow up?: 8-2-24    Any new pertinent information? (i.e, new medication allergies, new use of medications, change in patient's health or condition, non-compliance or inconsistency with prescribing agreement?):

## 2024-07-11 NOTE — PROGRESS NOTES
CHIEF COMPLAINT  F/u back and neck pain. Pt sts pain has worsened since last ov.     Subjective   Tanya Jimenes is a 80 y.o. male  who presents for follow-up.  He has a history of chronic back and neck pain. Reports this is worse since last evaluation. Believes this is due to weather.    Complains of pain in his neck and low back. Today his pain is 7/10VAS.  Describes the pain as continuous aching and throbbing. Continues with Hydrocodone 7.5/325 4/day and gabapentin 800 mg 2/day. Denies any side effects from the regimen, including constipation and somnolence.  The regimen helps decrease his pain by 50%. Notes improvement in activity and function with medication.  ADL's by self.  Denies any bowel or bladder changes. Does not take Klonopin at same time as Hydrocodone.      Cannot take NSAIDs due to CKD.  Does not want to repeat injections. Reports he has issues with his tongue afterwards.    Patient remained masked during entire encounter. No cough present. I donned a mask and eye protection throughout entire visit. Prior to donning mask and eye protection, hand hygiene was performed, as well as when it was doffed.  I was closer than 6 feet, but not for an extended period of time. No obvious exposure to any bodily fluids.    Neck Pain   This is a chronic problem. The current episode started more than 1 year ago. The problem occurs constantly. Progression since onset: worse from last evaluation-- due to colder weather. The pain is at a severity of 7/10. The pain is moderate. The symptoms are aggravated by position and bending. Pertinent negatives include no chest pain, fever, headaches, numbness, visual change or weakness. He has tried neck support and oral narcotics (gabapentin, Hydrocodone) for the symptoms. The treatment provided moderate relief.   Back Pain  This is a chronic problem. The current episode started more than 1 year ago. The problem occurs constantly. Progression since onset: worse from last  evaluation-- due to colder weather. The pain is at a severity of 7/10. The pain is moderate. Associated symptoms include abdominal pain (recent surgery). Pertinent negatives include no bladder incontinence, bowel incontinence, chest pain, dysuria, fever, headaches, numbness or weakness. He has tried analgesics (gabapentin, Hydrocodone) for the symptoms. The treatment provided moderate relief.      PEG Assessment   What number best describes your pain on average in the past week?7  What number best describes how, during the past week, pain has interfered with your enjoyment of life?7  What number best describes how, during the past week, pain has interfered with your general activity?  7    The following portions of the patient's history were reviewed and updated as appropriate: allergies, current medications, past family history, past medical history, past social history, past surgical history and problem list.    Review of Systems   Constitutional: Positive for fatigue. Negative for activity change and fever.   HENT: Negative for congestion.    Eyes: Negative for visual disturbance.   Respiratory: Positive for shortness of breath (occ). Negative for cough.    Cardiovascular: Negative for chest pain.   Gastrointestinal: Positive for abdominal pain (recent surgery). Negative for bowel incontinence, constipation and diarrhea.   Genitourinary: Negative for bladder incontinence, difficulty urinating and dysuria.   Musculoskeletal: Positive for back pain, neck pain and neck stiffness.   Allergic/Immunologic: Negative for immunocompromised state.   Neurological: Negative for dizziness, weakness, light-headedness, numbness and headaches.   Psychiatric/Behavioral: Positive for sleep disturbance. Negative for agitation and suicidal ideas. The patient is not nervous/anxious.      I have reviewed and confirmed the accuracy of the ROS as documented by the MA/CHU/ANIL Farmer, APRN      Vitals:    12/21/20 1444   BP:  "118/70   Pulse: 79   Resp: 20   Temp: 97.1 °F (36.2 °C)   SpO2: 97%   Weight: 51.3 kg (113 lb)   Height: 175.3 cm (69\")   PainSc:   7   PainLoc: Back  Comment: and neck         Objective   Physical Exam  Vitals signs and nursing note reviewed.   Constitutional:       Appearance: Normal appearance. He is well-developed.   HENT:      Head: Normocephalic and atraumatic.      Nose: Nose normal.   Eyes:      General: Lids are normal.      Conjunctiva/sclera: Conjunctivae normal.   Neck:      Musculoskeletal: Decreased range of motion. Spinous process tenderness and muscular tenderness present.   Cardiovascular:      Rate and Rhythm: Normal rate.   Pulmonary:      Effort: Pulmonary effort is normal. No respiratory distress.   Musculoskeletal:      Lumbar back: He exhibits decreased range of motion and tenderness.   Skin:     General: Skin is warm and dry.   Neurological:      Mental Status: He is alert and oriented to person, place, and time.      Gait: Gait normal.   Psychiatric:         Speech: Speech normal.         Behavior: Behavior normal. Behavior is cooperative.         Thought Content: Thought content normal.         Judgment: Judgment normal.       Assessment/Plan   Diagnoses and all orders for this visit:    1. Chronic pain syndrome (Primary)    2. Degeneration of intervertebral disc of lumbar region    3. Osteoarthritis of cervical spine, unspecified spinal osteoarthritis complication status    4. Encounter for long-term (current) use of high-risk medication    Other orders  -     HYDROcodone-acetaminophen (NORCO)  MG per tablet; Take 1 tablet by mouth Every 6 (Six) Hours As Needed for Moderate Pain .  Dispense: 120 tablet; Refill: 0      --- Routine UDS in office today as part of monitoring requirements for controlled substances.  The specimen was viewed and the immunoassay result reviewed and is +OPI and +BZD.  This specimen will be sent to [facility] for confirmation.     --- The patient " signed an updated copy of the controlled substance agreement on 12-21-20  --- Refill Hydrocodone. Patient appears stable with current regimen. No adverse effects. Regarding continuation of opioids, there is no evidence of aberrant behavior or any red flags.  The patient continues with appropriate response to opioid therapy. ADL's remain intact by self.   --- Follow-up 2 months or sooner if needed.           SULEIMAN REPORT  As part of the patient's treatment plan, I am prescribing controlled substances. The patient has been made aware of appropriate use of such medications, including potential risk of somnolence, limited ability to drive and/or work safely, and the potential for dependence or overdose. It has also bee made clear that these medications are for use by this patient only, without concomitant use of alcohol or other substances unless prescribed.     Patient has completed prescribing agreement detailing terms of continued prescribing of controlled substances, including monitoring SULEIMAN reports, urine drug screening, and pill counts if necessary. The patient is aware that inappropriate use will results in cessation of prescribing such medications.    SULEIMAN report has been reviewed and scanned into the patient's chart.    As the clinician, I personally reviewed the SULEIMAN from 12-21-20 while the patient was in the office today.    History and physical exam exhibit continued safe and appropriate use of controlled substances.        EMR Dragon/Transcription disclaimer:   Much of this encounter note is an electronic transcription/translation of spoken language to printed text. The electronic translation of spoken language may permit erroneous, or at times, nonsensical words or phrases to be inadvertently transcribed; Although I have reviewed the note for such errors, some may still exist.     Detail Level: Zone Render Post-Care Instructions In Note?: no Number Of Freeze-Thaw Cycles: 3 freeze-thaw cycles Show Aperture Variable?: Yes Post-Care Instructions: I reviewed with the patient in detail post-care instructions. Patient is to wear sunprotection, and avoid picking at any of the treated lesions. Pt may apply Vaseline to crusted or scabbing areas. Consent: The patient's consent was obtained including but not limited to risks of crusting, scabbing, blistering, scarring, darker or lighter pigmentary change, recurrence, incomplete removal and infection.

## 2024-09-25 ENCOUNTER — TELEPHONE (OUTPATIENT)
Dept: PAIN MEDICINE | Facility: CLINIC | Age: 84
End: 2024-09-25
Payer: MEDICARE
